# Patient Record
Sex: FEMALE | Race: WHITE | Employment: FULL TIME | URBAN - METROPOLITAN AREA
[De-identification: names, ages, dates, MRNs, and addresses within clinical notes are randomized per-mention and may not be internally consistent; named-entity substitution may affect disease eponyms.]

---

## 2017-02-12 ENCOUNTER — HOSPITAL ENCOUNTER (EMERGENCY)
Age: 42
Discharge: HOME | End: 2017-02-12
Attending: EMERGENCY MEDICINE
Payer: COMMERCIAL

## 2017-02-12 VITALS
SYSTOLIC BLOOD PRESSURE: 105 MMHG | OXYGEN SATURATION: 97 % | WEIGHT: 157 LBS | DIASTOLIC BLOOD PRESSURE: 64 MMHG | HEART RATE: 98 BPM | BODY MASS INDEX: 27.81 KG/M2 | TEMPERATURE: 97.7 F | RESPIRATION RATE: 16 BRPM

## 2017-02-12 DIAGNOSIS — B34.9 VIRAL SYNDROME: Primary | ICD-10-CM

## 2017-02-12 LAB
ALBUMIN SERPL-MCNC: 3.6 G/DL (ref 3.9–4.9)
ALP BLD-CCNC: 83 U/L (ref 40–130)
ALT SERPL-CCNC: 20 U/L (ref 0–33)
ANION GAP SERPL CALCULATED.3IONS-SCNC: 15 MEQ/L (ref 7–13)
AST SERPL-CCNC: 12 U/L (ref 0–35)
BASOPHILS ABSOLUTE: 0 K/UL (ref 0–0.2)
BASOPHILS RELATIVE PERCENT: 0 %
BETA-HYDROXYBUTYRATE: 0.9 MG/DL (ref 0.2–2.8)
BILIRUB SERPL-MCNC: 0.5 MG/DL (ref 0–1.2)
BILIRUBIN URINE: NEGATIVE
BLOOD, URINE: NEGATIVE
BUN BLDV-MCNC: 18 MG/DL (ref 6–20)
CALCIUM SERPL-MCNC: 8.1 MG/DL (ref 8.6–10.2)
CHLORIDE BLD-SCNC: 100 MEQ/L (ref 98–107)
CLARITY: CLEAR
CO2: 22 MEQ/L (ref 22–29)
COLOR: YELLOW
CREAT SERPL-MCNC: 0.69 MG/DL (ref 0.5–0.9)
EOSINOPHILS ABSOLUTE: 0.1 K/UL (ref 0–0.7)
EOSINOPHILS RELATIVE PERCENT: 1.4 %
GFR AFRICAN AMERICAN: >60
GFR NON-AFRICAN AMERICAN: >60
GLOBULIN: 3 G/DL (ref 2.3–3.5)
GLUCOSE BLD-MCNC: 252 MG/DL (ref 74–109)
GLUCOSE URINE: 500 MG/DL
HCT VFR BLD CALC: 46.4 % (ref 37–47)
HEMOGLOBIN: 15.8 G/DL (ref 12–16)
KETONES, URINE: NEGATIVE MG/DL
LEUKOCYTE ESTERASE, URINE: NEGATIVE
LYMPHOCYTES ABSOLUTE: 0.5 K/UL (ref 1–4.8)
LYMPHOCYTES RELATIVE PERCENT: 8.2 %
MCH RBC QN AUTO: 28.2 PG (ref 27–31.3)
MCHC RBC AUTO-ENTMCNC: 34.1 % (ref 33–37)
MCV RBC AUTO: 82.8 FL (ref 82–100)
MONOCYTES ABSOLUTE: 0.4 K/UL (ref 0.2–0.8)
MONOCYTES RELATIVE PERCENT: 7.3 %
NEUTROPHILS ABSOLUTE: 4.9 K/UL (ref 1.4–6.5)
NEUTROPHILS RELATIVE PERCENT: 83.1 %
NITRITE, URINE: NEGATIVE
PDW BLD-RTO: 12.7 % (ref 11.5–14.5)
PH UA: 5.5 (ref 5–9)
PLATELET # BLD: 255 K/UL (ref 130–400)
POTASSIUM SERPL-SCNC: 3.3 MEQ/L (ref 3.5–5.1)
PROTEIN UA: NEGATIVE MG/DL
RAPID INFLUENZA  B AGN: NEGATIVE
RAPID INFLUENZA A AGN: NEGATIVE
RBC # BLD: 5.6 M/UL (ref 4.2–5.4)
S PYO AG THROAT QL: NEGATIVE
SODIUM BLD-SCNC: 137 MEQ/L (ref 132–144)
SPECIFIC GRAVITY UA: 1.01 (ref 1–1.03)
TOTAL PROTEIN: 6.6 G/DL (ref 6.4–8.1)
URINE REFLEX TO CULTURE: ABNORMAL
UROBILINOGEN, URINE: 0.2 E.U./DL
WBC # BLD: 5.9 K/UL (ref 4.8–10.8)

## 2017-02-12 PROCEDURE — 87880 STREP A ASSAY W/OPTIC: CPT

## 2017-02-12 PROCEDURE — 86403 PARTICLE AGGLUT ANTBDY SCRN: CPT

## 2017-02-12 PROCEDURE — 96374 THER/PROPH/DIAG INJ IV PUSH: CPT

## 2017-02-12 PROCEDURE — 82010 KETONE BODYS QUAN: CPT

## 2017-02-12 PROCEDURE — 99283 EMERGENCY DEPT VISIT LOW MDM: CPT

## 2017-02-12 PROCEDURE — 87081 CULTURE SCREEN ONLY: CPT

## 2017-02-12 PROCEDURE — 81003 URINALYSIS AUTO W/O SCOPE: CPT

## 2017-02-12 PROCEDURE — 6360000002 HC RX W HCPCS: Performed by: EMERGENCY MEDICINE

## 2017-02-12 PROCEDURE — 80053 COMPREHEN METABOLIC PANEL: CPT

## 2017-02-12 PROCEDURE — 2580000003 HC RX 258: Performed by: EMERGENCY MEDICINE

## 2017-02-12 PROCEDURE — 85025 COMPLETE CBC W/AUTO DIFF WBC: CPT

## 2017-02-12 RX ORDER — 0.9 % SODIUM CHLORIDE 0.9 %
1000 INTRAVENOUS SOLUTION INTRAVENOUS ONCE
Status: COMPLETED | OUTPATIENT
Start: 2017-02-12 | End: 2017-02-12

## 2017-02-12 RX ORDER — ONDANSETRON 2 MG/ML
4 INJECTION INTRAMUSCULAR; INTRAVENOUS ONCE
Status: COMPLETED | OUTPATIENT
Start: 2017-02-12 | End: 2017-02-12

## 2017-02-12 RX ORDER — ONDANSETRON 4 MG/1
4 TABLET, FILM COATED ORAL EVERY 8 HOURS PRN
Qty: 20 TABLET | Refills: 0 | Status: SHIPPED | OUTPATIENT
Start: 2017-02-12 | End: 2017-03-15

## 2017-02-12 RX ADMIN — ONDANSETRON HYDROCHLORIDE 4 MG: 2 SOLUTION INTRAMUSCULAR; INTRAVENOUS at 19:00

## 2017-02-12 RX ADMIN — SODIUM CHLORIDE 1000 ML: 9 INJECTION, SOLUTION INTRAVENOUS at 19:00

## 2017-02-12 ASSESSMENT — ENCOUNTER SYMPTOMS
VOMITING: 1
FACIAL SWELLING: 0
SORE THROAT: 1
SINUS PRESSURE: 0
STRIDOR: 0
CHEST TIGHTNESS: 0
WHEEZING: 0
EYE PAIN: 0
TROUBLE SWALLOWING: 0
DIARRHEA: 1
CONSTIPATION: 0
VOICE CHANGE: 0
SHORTNESS OF BREATH: 0
BACK PAIN: 0
CHOKING: 0
ABDOMINAL PAIN: 0
NAUSEA: 1
EYE REDNESS: 0
COUGH: 0
BLOOD IN STOOL: 0
EYE DISCHARGE: 0

## 2017-02-14 LAB — S PYO THROAT QL CULT: NORMAL

## 2017-03-09 ENCOUNTER — HOSPITAL ENCOUNTER (EMERGENCY)
Age: 42
Discharge: HOME | End: 2017-03-09
Attending: EMERGENCY MEDICINE
Payer: COMMERCIAL

## 2017-03-09 ENCOUNTER — APPOINTMENT (OUTPATIENT)
Dept: GENERAL RADIOLOGY | Age: 42
End: 2017-03-09
Payer: COMMERCIAL

## 2017-03-09 VITALS
SYSTOLIC BLOOD PRESSURE: 118 MMHG | BODY MASS INDEX: 26.8 KG/M2 | RESPIRATION RATE: 20 BRPM | DIASTOLIC BLOOD PRESSURE: 78 MMHG | HEART RATE: 90 BPM | HEIGHT: 64 IN | OXYGEN SATURATION: 97 % | WEIGHT: 157 LBS | TEMPERATURE: 98.1 F

## 2017-03-09 DIAGNOSIS — M77.10 LATERAL EPICONDYLITIS  OF ELBOW: Primary | ICD-10-CM

## 2017-03-09 PROCEDURE — 73080 X-RAY EXAM OF ELBOW: CPT

## 2017-03-09 PROCEDURE — 96372 THER/PROPH/DIAG INJ SC/IM: CPT

## 2017-03-09 PROCEDURE — 99283 EMERGENCY DEPT VISIT LOW MDM: CPT

## 2017-03-09 PROCEDURE — 6360000002 HC RX W HCPCS: Performed by: EMERGENCY MEDICINE

## 2017-03-09 RX ORDER — KETOROLAC TROMETHAMINE 30 MG/ML
60 INJECTION, SOLUTION INTRAMUSCULAR; INTRAVENOUS ONCE
Status: COMPLETED | OUTPATIENT
Start: 2017-03-09 | End: 2017-03-09

## 2017-03-09 RX ORDER — HYDROCODONE BITARTRATE AND ACETAMINOPHEN 5; 325 MG/1; MG/1
1 TABLET ORAL EVERY 6 HOURS PRN
Qty: 8 TABLET | Refills: 0 | Status: SHIPPED | OUTPATIENT
Start: 2017-03-09 | End: 2017-03-13

## 2017-03-09 RX ORDER — ETODOLAC 400 MG/1
400 TABLET, EXTENDED RELEASE ORAL DAILY
Qty: 10 TABLET | Refills: 0 | Status: SHIPPED | OUTPATIENT
Start: 2017-03-09 | End: 2017-03-15

## 2017-03-09 RX ADMIN — KETOROLAC TROMETHAMINE 60 MG: 30 INJECTION, SOLUTION INTRAMUSCULAR at 12:12

## 2017-03-09 ASSESSMENT — ENCOUNTER SYMPTOMS
BLOOD IN STOOL: 0
COUGH: 0
RHINORRHEA: 0
EYE PAIN: 0
BACK PAIN: 0
NAUSEA: 0
DIARRHEA: 0
ABDOMINAL PAIN: 0
WHEEZING: 0
VOMITING: 0
SHORTNESS OF BREATH: 0
CHEST TIGHTNESS: 0
TROUBLE SWALLOWING: 0

## 2017-03-09 ASSESSMENT — PAIN DESCRIPTION - PAIN TYPE: TYPE: ACUTE PAIN

## 2017-03-09 ASSESSMENT — PAIN SCALES - GENERAL
PAINLEVEL_OUTOF10: 10
PAINLEVEL_OUTOF10: 10

## 2017-03-09 ASSESSMENT — PAIN DESCRIPTION - ONSET: ONSET: PROGRESSIVE

## 2017-03-09 ASSESSMENT — PAIN DESCRIPTION - ORIENTATION: ORIENTATION: RIGHT

## 2017-03-09 ASSESSMENT — PAIN DESCRIPTION - LOCATION: LOCATION: ELBOW

## 2017-03-09 ASSESSMENT — PAIN DESCRIPTION - DESCRIPTORS: DESCRIPTORS: SHARP

## 2017-03-09 ASSESSMENT — PAIN DESCRIPTION - PROGRESSION: CLINICAL_PROGRESSION: GRADUALLY WORSENING

## 2017-03-09 ASSESSMENT — PAIN DESCRIPTION - FREQUENCY: FREQUENCY: CONTINUOUS

## 2017-03-15 ENCOUNTER — TELEPHONE (OUTPATIENT)
Dept: PRIMARY CARE CLINIC | Age: 42
End: 2017-03-15

## 2017-03-15 ENCOUNTER — OFFICE VISIT (OUTPATIENT)
Dept: PRIMARY CARE CLINIC | Age: 42
End: 2017-03-15

## 2017-03-15 VITALS
WEIGHT: 161 LBS | SYSTOLIC BLOOD PRESSURE: 112 MMHG | DIASTOLIC BLOOD PRESSURE: 74 MMHG | TEMPERATURE: 98.2 F | RESPIRATION RATE: 12 BRPM | HEART RATE: 84 BPM | HEIGHT: 64 IN | BODY MASS INDEX: 27.49 KG/M2

## 2017-03-15 DIAGNOSIS — M25.521 RIGHT ELBOW PAIN: ICD-10-CM

## 2017-03-15 DIAGNOSIS — M77.11 EPICONDYLITIS, LATERAL, RIGHT: Primary | ICD-10-CM

## 2017-03-15 DIAGNOSIS — E11.9 TYPE 2 DIABETES MELLITUS WITHOUT COMPLICATION, WITHOUT LONG-TERM CURRENT USE OF INSULIN (HCC): ICD-10-CM

## 2017-03-15 LAB
CREATININE URINE: 67.3 MG/DL
HBA1C MFR BLD: 6.7 % (ref 4.8–5.9)
MICROALBUMIN UR-MCNC: <1.2 MG/DL
MICROALBUMIN/CREAT UR-RTO: NORMAL MG/G (ref 0–30)

## 2017-03-15 PROCEDURE — 20605 DRAIN/INJ JOINT/BURSA W/O US: CPT | Performed by: FAMILY MEDICINE

## 2017-03-15 PROCEDURE — 99213 OFFICE O/P EST LOW 20 MIN: CPT | Performed by: FAMILY MEDICINE

## 2017-03-15 RX ORDER — TRIAMCINOLONE ACETONIDE 40 MG/ML
80 INJECTION, SUSPENSION INTRA-ARTICULAR; INTRAMUSCULAR ONCE
Status: COMPLETED | OUTPATIENT
Start: 2017-03-15 | End: 2017-03-15

## 2017-03-15 RX ORDER — HYDROCODONE BITARTRATE AND ACETAMINOPHEN 5; 325 MG/1; MG/1
1 TABLET ORAL EVERY 6 HOURS PRN
COMMUNITY
End: 2017-04-10

## 2017-03-15 RX ORDER — TRAMADOL HYDROCHLORIDE 50 MG/1
100 TABLET ORAL EVERY 8 HOURS PRN
Qty: 18 TABLET | Refills: 0 | Status: SHIPPED | OUTPATIENT
Start: 2017-03-15 | End: 2017-03-21

## 2017-03-15 RX ADMIN — TRIAMCINOLONE ACETONIDE 80 MG: 40 INJECTION, SUSPENSION INTRA-ARTICULAR; INTRAMUSCULAR at 13:55

## 2017-03-15 ASSESSMENT — ENCOUNTER SYMPTOMS
CONSTIPATION: 0
DIARRHEA: 0
COLOR CHANGE: 0
ABDOMINAL PAIN: 0
PHOTOPHOBIA: 0
CHOKING: 0
CHEST TIGHTNESS: 0
STRIDOR: 0
APNEA: 0
NAUSEA: 0
WHEEZING: 0
EYE DISCHARGE: 0
EYE REDNESS: 0
FACIAL SWELLING: 0
EYE PAIN: 0

## 2017-09-13 RX ORDER — FLUTICASONE PROPIONATE 50 MCG
2 SPRAY, SUSPENSION (ML) NASAL DAILY
Qty: 1 BOTTLE | Refills: 1 | Status: SHIPPED | OUTPATIENT
Start: 2017-09-13 | End: 2017-11-22

## 2017-09-13 RX ORDER — CETIRIZINE HYDROCHLORIDE, PSEUDOEPHEDRINE HYDROCHLORIDE 5; 120 MG/1; MG/1
1 TABLET, FILM COATED, EXTENDED RELEASE ORAL 2 TIMES DAILY
Qty: 60 TABLET | Refills: 0 | Status: SHIPPED | OUTPATIENT
Start: 2017-09-13 | End: 2017-10-13

## 2017-09-13 RX ORDER — AMOXICILLIN AND CLAVULANATE POTASSIUM 875; 125 MG/1; MG/1
1 TABLET, FILM COATED ORAL 2 TIMES DAILY
Qty: 20 TABLET | Refills: 0 | Status: SHIPPED | OUTPATIENT
Start: 2017-09-13 | End: 2017-09-23

## 2017-10-11 ENCOUNTER — OFFICE VISIT (OUTPATIENT)
Dept: PRIMARY CARE CLINIC | Age: 42
End: 2017-10-11

## 2017-10-11 VITALS
BODY MASS INDEX: 26.8 KG/M2 | WEIGHT: 157 LBS | SYSTOLIC BLOOD PRESSURE: 104 MMHG | TEMPERATURE: 97.7 F | DIASTOLIC BLOOD PRESSURE: 78 MMHG | HEIGHT: 64 IN | RESPIRATION RATE: 14 BRPM | HEART RATE: 88 BPM

## 2017-10-11 DIAGNOSIS — E11.9 TYPE 2 DIABETES MELLITUS WITHOUT COMPLICATION, WITHOUT LONG-TERM CURRENT USE OF INSULIN (HCC): Primary | ICD-10-CM

## 2017-10-11 DIAGNOSIS — R53.83 FATIGUE, UNSPECIFIED TYPE: ICD-10-CM

## 2017-10-11 DIAGNOSIS — R61 DIAPHORESIS: ICD-10-CM

## 2017-10-11 PROCEDURE — 99214 OFFICE O/P EST MOD 30 MIN: CPT | Performed by: FAMILY MEDICINE

## 2017-10-11 ASSESSMENT — ENCOUNTER SYMPTOMS
CHOKING: 0
COLOR CHANGE: 0
WHEEZING: 0
NAUSEA: 0
PHOTOPHOBIA: 0
APNEA: 0
CHEST TIGHTNESS: 0
EYE REDNESS: 0
STRIDOR: 0
CONSTIPATION: 0
VISUAL CHANGE: 0
DIARRHEA: 0
BLURRED VISION: 0
EYE DISCHARGE: 0
FACIAL SWELLING: 0
ABDOMINAL PAIN: 0
EYE PAIN: 0

## 2017-10-11 ASSESSMENT — PATIENT HEALTH QUESTIONNAIRE - PHQ9
1. LITTLE INTEREST OR PLEASURE IN DOING THINGS: 0
SUM OF ALL RESPONSES TO PHQ9 QUESTIONS 1 & 2: 0
SUM OF ALL RESPONSES TO PHQ QUESTIONS 1-9: 0
2. FEELING DOWN, DEPRESSED OR HOPELESS: 0

## 2017-10-11 NOTE — PROGRESS NOTES
Subjective:      Patient ID: Bettie Hill is a 38 y.o. female who presents today for:  Chief Complaint   Patient presents with    Diabetes     Pt. is here for a f/u on DM. Pt. is currently taking Jardiance and Januvia which she feels like she is always hungry. Pt. wants to know if she can have something to curve her appetite. Pt. has stopped Januvia because she felt like she was gaining weight. Pt. feels sluggish, has little energy. Pt. states that she is sweating immediately after she eats & all day long, like hot flashes. Pt. would like to have labs done today. Diabetes   She presents for her follow-up diabetic visit. She has type 2 diabetes mellitus. Her disease course has been stable. Hypoglycemia symptoms include sweats. Pertinent negatives for hypoglycemia include no confusion, headaches, nervousness/anxiousness, pallor, speech difficulty or tremors. Associated symptoms include fatigue and polyphagia. Pertinent negatives for diabetes include no blurred vision, no chest pain, no foot paresthesias, no foot ulcerations, no polydipsia, no polyuria, no visual change, no weakness and no weight loss. There are no hypoglycemic complications. Symptoms are stable. There are no diabetic complications. Risk factors for coronary artery disease include diabetes mellitus. Current diabetic treatments: Merlinda Endow. She is compliant with treatment all of the time (no longer taking Januvia). Her weight is fluctuating minimally. She is following a generally healthy diet. When asked about meal planning, she reported none.        Past Medical History:   Diagnosis Date    Contusion of left shoulder 6/16/2016    DM (diabetes mellitus) ([facility] [address] 11/7/2014    Dysmenorrhea 6/29/2015    Fatigue 2/6/2015    Gestational diabetes, x 2 of 5 pregnancies 11/7/2014    S/P vaginal hysterectomy, 10/15 4/7/2016    Uterine fibroids, Dr Miriam Morales 6/29/2015     Past Surgical History:   Procedure Laterality Date    BLADDER SUSPENSION      CHOLECYSTECTOMY      COLONOSCOPY      HEMORRHOID SURGERY  2001    HYSTERECTOMY       Family History   Problem Relation Age of Onset    Asthma Mother     Depression Mother     Cancer Mother     Diabetes Maternal Grandmother     Depression Maternal Grandmother     Diabetes Paternal Grandmother     Diabetes Paternal Grandfather     Diabetes Paternal Aunt     Diabetes Paternal Uncle     High Blood Pressure Maternal Grandfather     Heart Disease Maternal Grandfather      Social History     Social History    Marital status: Single     Spouse name: N/A    Number of children: N/A    Years of education: N/A     Occupational History    Not on file. Social History Main Topics    Smoking status: Former Smoker    Smokeless tobacco: Never Used    Alcohol use No    Drug use: No    Sexual activity: Yes     Partners: Male     Other Topics Concern    Not on file     Social History Narrative    No narrative on file     Allergies:  Loura Needy; Metformin and related; and Oxycodone-acetaminophen    Review of Systems   Constitutional: Positive for fatigue. Negative for activity change, appetite change, chills, diaphoresis, fever and weight loss. HENT: Negative for congestion, ear discharge, ear pain, facial swelling, hearing loss and mouth sores. Eyes: Negative for blurred vision, photophobia, pain, discharge and redness. Respiratory: Negative for apnea, choking, chest tightness, wheezing and stridor. Cardiovascular: Negative for chest pain, palpitations and leg swelling. Gastrointestinal: Negative for abdominal pain, constipation, diarrhea and nausea. Endocrine: Positive for polyphagia. Negative for cold intolerance, heat intolerance, polydipsia and polyuria. Genitourinary: Negative for dysuria and frequency. Musculoskeletal: Negative for gait problem, joint swelling, neck pain and neck stiffness. Skin: Negative for color change, pallor, rash and wound. Allergic/Immunologic: Negative for immunocompromised state. Neurological: Negative for tremors, syncope, facial asymmetry, speech difficulty, weakness and headaches. Psychiatric/Behavioral: Negative for confusion and hallucinations. The patient is not nervous/anxious and is not hyperactive. Objective:   /78 (Site: Right Arm, Position: Sitting, Cuff Size: Large Adult)   Pulse 88   Temp 97.7 °F (36.5 °C) (Oral)   Resp 14   Ht 5' 4\" (1.626 m)   Wt 157 lb (71.2 kg)   LMP 06/25/2015   BMI 26.95 kg/m²     Physical Exam   Constitutional: She is oriented to person, place, and time. She appears well-developed and well-nourished. HENT:   Head: Normocephalic and atraumatic. Right Ear: Hearing, tympanic membrane, external ear and ear canal normal. No drainage or tenderness. Left Ear: Hearing, tympanic membrane, external ear and ear canal normal. No drainage. Mouth/Throat: Oropharynx is clear and moist. No oropharyngeal exudate. Eyes: Conjunctivae and EOM are normal. Pupils are equal, round, and reactive to light. Right eye exhibits no discharge. Left eye exhibits no discharge. No scleral icterus. Neck: Normal range of motion. Neck supple. No tracheal deviation present. No thyromegaly present. Cardiovascular: Normal rate, regular rhythm, normal heart sounds and intact distal pulses. Exam reveals no gallop and no friction rub. No murmur heard. Pulmonary/Chest: Effort normal and breath sounds normal. No respiratory distress. She has no wheezes. She has no rales. She exhibits no tenderness. Abdominal: Soft. Bowel sounds are normal. She exhibits no distension and no mass. There is tenderness in the right lower quadrant. There is no rebound and no guarding. Musculoskeletal: She exhibits no edema. Lymphadenopathy:     She has no cervical adenopathy. Neurological: She is alert and oriented to person, place, and time. Coordination normal.   Skin: Skin is warm and dry. No rash noted. Return in about 6 weeks (around 11/22/2017) for Victoza f/u. I, Niranjan Lake CMA   , am scribing for and in the presence of Jia Isaacs DO. Electronically signed by :  Niranjan Horn DO, personally performed the services described in this documentation, as scribed by Ammy Dumont CMA   in my presence, and it is both accurate and complete.  Electronically signed by: Jia Isaacs DO    10/12/17 7:39 AM    Jia Isaacs DO

## 2017-10-14 DIAGNOSIS — E11.9 TYPE 2 DIABETES MELLITUS WITHOUT COMPLICATION, WITHOUT LONG-TERM CURRENT USE OF INSULIN (HCC): ICD-10-CM

## 2017-10-14 DIAGNOSIS — R53.83 FATIGUE, UNSPECIFIED TYPE: ICD-10-CM

## 2017-10-14 DIAGNOSIS — R61 DIAPHORESIS: ICD-10-CM

## 2017-10-14 LAB
ALBUMIN SERPL-MCNC: 4 G/DL (ref 3.9–4.9)
ALP BLD-CCNC: 92 U/L (ref 40–130)
ALT SERPL-CCNC: 20 U/L (ref 0–33)
ANION GAP SERPL CALCULATED.3IONS-SCNC: 14 MEQ/L (ref 7–13)
AST SERPL-CCNC: 10 U/L (ref 0–35)
BILIRUB SERPL-MCNC: 0.3 MG/DL (ref 0–1.2)
BUN BLDV-MCNC: 16 MG/DL (ref 6–20)
CALCIUM SERPL-MCNC: 8.9 MG/DL (ref 8.6–10.2)
CHLORIDE BLD-SCNC: 104 MEQ/L (ref 98–107)
CHOLESTEROL, TOTAL: 192 MG/DL (ref 0–199)
CO2: 21 MEQ/L (ref 22–29)
CREAT SERPL-MCNC: 0.5 MG/DL (ref 0.5–0.9)
FOLLICLE STIMULATING HORMONE: 51.5 MIU/ML
GFR AFRICAN AMERICAN: >60
GFR NON-AFRICAN AMERICAN: >60
GLOBULIN: 2.5 G/DL (ref 2.3–3.5)
GLUCOSE BLD-MCNC: 109 MG/DL (ref 74–109)
HBA1C MFR BLD: 6.8 % (ref 4.8–5.9)
HDLC SERPL-MCNC: 45 MG/DL (ref 40–59)
LDL CHOLESTEROL CALCULATED: 132 MG/DL (ref 0–129)
LUTEINIZING HORMONE: 25.8 MIU/ML
POTASSIUM SERPL-SCNC: 4.5 MEQ/L (ref 3.5–5.1)
SODIUM BLD-SCNC: 139 MEQ/L (ref 132–144)
T4 FREE: 1.34 NG/DL (ref 0.93–1.7)
T4 TOTAL: 7.7 UG/DL (ref 4.5–11.7)
TOTAL PROTEIN: 6.5 G/DL (ref 6.4–8.1)
TRIGL SERPL-MCNC: 74 MG/DL (ref 0–200)
TSH SERPL DL<=0.05 MIU/L-ACNC: 1.48 UIU/ML (ref 0.27–4.2)

## 2017-10-18 LAB
ESTRADIOL LEVEL: 14.6 PG/ML
ESTROGEN TOTAL: 30.4 PG/ML
ESTRONE: 15.8 PG/ML

## 2017-10-31 RX ORDER — EMPAGLIFLOZIN 25 MG/1
TABLET, FILM COATED ORAL
Qty: 30 TABLET | Refills: 0 | Status: SHIPPED | OUTPATIENT
Start: 2017-10-31 | End: 2018-01-15 | Stop reason: SDUPTHER

## 2017-11-14 NOTE — TELEPHONE ENCOUNTER
Pt is requesting and rx for Victoza be sent to pharm on file. She is out of the samples we gave her and we don't have any more.

## 2017-11-22 ENCOUNTER — OFFICE VISIT (OUTPATIENT)
Dept: PRIMARY CARE CLINIC | Age: 42
End: 2017-11-22

## 2017-11-22 VITALS
HEART RATE: 84 BPM | HEIGHT: 64 IN | TEMPERATURE: 97.6 F | BODY MASS INDEX: 27.14 KG/M2 | RESPIRATION RATE: 16 BRPM | SYSTOLIC BLOOD PRESSURE: 118 MMHG | WEIGHT: 159 LBS | DIASTOLIC BLOOD PRESSURE: 76 MMHG

## 2017-11-22 DIAGNOSIS — Z78.0 POST-MENOPAUSAL: ICD-10-CM

## 2017-11-22 DIAGNOSIS — Z23 NEED FOR INFLUENZA VACCINATION: ICD-10-CM

## 2017-11-22 DIAGNOSIS — E11.9 TYPE 2 DIABETES MELLITUS WITHOUT COMPLICATION, WITHOUT LONG-TERM CURRENT USE OF INSULIN (HCC): Primary | ICD-10-CM

## 2017-11-22 DIAGNOSIS — R53.83 FATIGUE, UNSPECIFIED TYPE: ICD-10-CM

## 2017-11-22 PROCEDURE — G8484 FLU IMMUNIZE NO ADMIN: HCPCS | Performed by: FAMILY MEDICINE

## 2017-11-22 PROCEDURE — 90471 IMMUNIZATION ADMIN: CPT | Performed by: FAMILY MEDICINE

## 2017-11-22 PROCEDURE — G8427 DOCREV CUR MEDS BY ELIG CLIN: HCPCS | Performed by: FAMILY MEDICINE

## 2017-11-22 PROCEDURE — 99213 OFFICE O/P EST LOW 20 MIN: CPT | Performed by: FAMILY MEDICINE

## 2017-11-22 PROCEDURE — 1036F TOBACCO NON-USER: CPT | Performed by: FAMILY MEDICINE

## 2017-11-22 PROCEDURE — 3044F HG A1C LEVEL LT 7.0%: CPT | Performed by: FAMILY MEDICINE

## 2017-11-22 PROCEDURE — G8419 CALC BMI OUT NRM PARAM NOF/U: HCPCS | Performed by: FAMILY MEDICINE

## 2017-11-22 PROCEDURE — 90686 IIV4 VACC NO PRSV 0.5 ML IM: CPT | Performed by: FAMILY MEDICINE

## 2017-11-22 ASSESSMENT — ENCOUNTER SYMPTOMS
BLURRED VISION: 0
BACK PAIN: 0
EYES NEGATIVE: 1
WHEEZING: 0
PHOTOPHOBIA: 0
EYE ITCHING: 0
RESPIRATORY NEGATIVE: 1
DIARRHEA: 0
GASTROINTESTINAL NEGATIVE: 1
CONSTIPATION: 0
SHORTNESS OF BREATH: 0
ABDOMINAL PAIN: 0
EYE REDNESS: 0
VISUAL CHANGE: 0

## 2017-11-22 NOTE — PROGRESS NOTES
normal. Pupils are equal, round, and reactive to light. No scleral icterus. Neck: Normal range of motion. Neck supple. Cardiovascular: Normal rate, regular rhythm and normal heart sounds. Exam reveals no gallop and no friction rub. No murmur heard. Pulmonary/Chest: Effort normal and breath sounds normal. No respiratory distress. She has no wheezes. She has no rales. She exhibits no tenderness. Neurological: She is alert and oriented to person, place, and time. Skin: Skin is warm and dry. No rash noted. No erythema. No pallor. Psychiatric: She has a normal mood and affect. Her behavior is normal. Judgment normal.   Nursing note and vitals reviewed. Assessment:     1. Type 2 diabetes mellitus without complication, without long-term current use of insulin ([facility] [address]     2. Fatigue, unspecified type     3. Post-menopausal     4. Need for influenza vaccination  INFLUENZA, QUADV, 3 YRS AND OLDER, IM, PF, PREFILL SYR OR SDV, 0.5ML (FLUZONE QUADV, PF)       Plan:      Orders Placed This Encounter   Procedures    INFLUENZA, QUADV, 3 YRS AND OLDER, IM, PF, PREFILL SYR OR SDV, 0.5ML (FLUZONE QUADV, PF)     Orders Placed This Encounter   Medications    estrogens, conjugated, (PREMARIN) 0.3 MG tablet     Sig: Take 1 tablet by mouth daily     Dispense:  30 tablet     Refill:  3       Controlled Substances Monitoring:      No Follow-up on file. I, Ana Lake CMA   , am scribing for and in the presence of [employer], DO. Electronically signed by :  Ana Koehler DO, personally performed the services described in this documentation, as scribed by Berta Moreno CMA   in my presence, and it is both accurate and complete.  Electronically signed by: [employer], DO    11/22/17 1:28 PM    Elsa Mcginnis DO

## 2017-12-28 RX ORDER — NAPROXEN 500 MG/1
TABLET ORAL
Qty: 30 TABLET | Refills: 0 | Status: SHIPPED | OUTPATIENT
Start: 2017-12-28 | End: 2018-01-15 | Stop reason: SDUPTHER

## 2017-12-28 RX ORDER — PEN NEEDLE, DIABETIC 32GX 5/32"
NEEDLE, DISPOSABLE MISCELLANEOUS
Qty: 100 EACH | Refills: 0 | Status: SHIPPED | OUTPATIENT
Start: 2017-12-28 | End: 2018-08-09 | Stop reason: SDUPTHER

## 2018-01-15 ENCOUNTER — OFFICE VISIT (OUTPATIENT)
Dept: PRIMARY CARE CLINIC | Age: 43
End: 2018-01-15

## 2018-01-15 VITALS
HEIGHT: 64 IN | TEMPERATURE: 97.6 F | BODY MASS INDEX: 27.47 KG/M2 | SYSTOLIC BLOOD PRESSURE: 118 MMHG | RESPIRATION RATE: 14 BRPM | OXYGEN SATURATION: 99 % | WEIGHT: 160.9 LBS | HEART RATE: 86 BPM | DIASTOLIC BLOOD PRESSURE: 82 MMHG

## 2018-01-15 DIAGNOSIS — R20.2 NUMBNESS AND TINGLING OF RIGHT ARM: ICD-10-CM

## 2018-01-15 DIAGNOSIS — R20.0 NUMBNESS AND TINGLING OF RIGHT ARM: ICD-10-CM

## 2018-01-15 DIAGNOSIS — M77.01 MEDIAL EPICONDYLITIS OF ELBOW, RIGHT: Primary | ICD-10-CM

## 2018-01-15 DIAGNOSIS — M25.521 RIGHT ELBOW PAIN: ICD-10-CM

## 2018-01-15 PROCEDURE — G8419 CALC BMI OUT NRM PARAM NOF/U: HCPCS | Performed by: FAMILY MEDICINE

## 2018-01-15 PROCEDURE — 1036F TOBACCO NON-USER: CPT | Performed by: FAMILY MEDICINE

## 2018-01-15 PROCEDURE — G8484 FLU IMMUNIZE NO ADMIN: HCPCS | Performed by: FAMILY MEDICINE

## 2018-01-15 PROCEDURE — G8427 DOCREV CUR MEDS BY ELIG CLIN: HCPCS | Performed by: FAMILY MEDICINE

## 2018-01-15 PROCEDURE — 99213 OFFICE O/P EST LOW 20 MIN: CPT | Performed by: FAMILY MEDICINE

## 2018-01-15 PROCEDURE — 20605 DRAIN/INJ JOINT/BURSA W/O US: CPT | Performed by: FAMILY MEDICINE

## 2018-01-15 RX ORDER — TRAMADOL HYDROCHLORIDE 50 MG/1
100 TABLET ORAL EVERY 8 HOURS PRN
Qty: 20 TABLET | Refills: 0 | Status: SHIPPED | OUTPATIENT
Start: 2018-01-15 | End: 2018-01-21

## 2018-01-15 RX ORDER — NAPROXEN 500 MG/1
TABLET ORAL
Qty: 60 TABLET | Refills: 3 | Status: SHIPPED | OUTPATIENT
Start: 2018-01-15 | End: 2018-05-21 | Stop reason: SDUPTHER

## 2018-01-15 RX ORDER — TRIAMCINOLONE ACETONIDE 40 MG/ML
80 INJECTION, SUSPENSION INTRA-ARTICULAR; INTRAMUSCULAR ONCE
Status: COMPLETED | OUTPATIENT
Start: 2018-01-15 | End: 2018-01-15

## 2018-01-15 RX ADMIN — TRIAMCINOLONE ACETONIDE 80 MG: 40 INJECTION, SUSPENSION INTRA-ARTICULAR; INTRAMUSCULAR at 19:54

## 2018-01-15 ASSESSMENT — ENCOUNTER SYMPTOMS
EYE PAIN: 0
FACIAL SWELLING: 0
PHOTOPHOBIA: 0
NAUSEA: 0
APNEA: 0
ABDOMINAL PAIN: 0
EYE REDNESS: 0
CHEST TIGHTNESS: 0
STRIDOR: 0
EYE DISCHARGE: 0
WHEEZING: 0
COLOR CHANGE: 0
DIARRHEA: 0
CONSTIPATION: 0
CHOKING: 0

## 2018-01-15 NOTE — PROGRESS NOTES
pain    42703 - MD DRAIN/INJECT INTERMEDIATE JOINT/BURSA     Orders Placed This Encounter   Medications    naproxen (NAPROSYN) 500 MG tablet     Sig: TAKE 1 TABLET BY MOUTH TWICE DAILY WITH MEALS     Dispense:  60 tablet     Refill:  3    estrogens, conjugated, (PREMARIN) 0.3 MG tablet     Sig: Take 1 tablet by mouth daily     Dispense:  30 tablet     Refill:  3    Liraglutide (VICTOZA) 18 MG/3ML SOPN SC injection     Sig: .6mg qd x 1 week, then 1.2mg qd x 1 week, then 1.8 mg qd regularly. Also include #30, 32ga needles per month. Dispense:  9 mL     Refill:  3    empagliflozin (JARDIANCE) 25 MG tablet     Sig: TAKE 1 TABLET BY MOUTH DAILY     Dispense:  30 tablet     Refill:  3    triamcinolone acetonide (KENALOG-40) injection 80 mg    traMADol (ULTRAM) 50 MG tablet     Sig: Take 2 tablets by mouth every 8 hours as needed for Pain for up to 6 days . Take lowest dose possible to manage pain. Dispense:  20 tablet     Refill:  0       Controlled Substances Monitoring:      No Follow-up on file. IMayuri RMA  , am scribing for and in the presence of Yvonne Murillo DO. Electronically signed by :  JANEY Dennis    IYvonne DO, personally performed the services described in this documentation, as scribed by JANEY Dennis   in my presence, and it is both accurate and complete.  Electronically signed by: Yvonne Murillo DO    1/16/18 8:24 AM    Yvonne Murillo DO

## 2018-01-22 ENCOUNTER — HOSPITAL ENCOUNTER (OUTPATIENT)
Dept: GENERAL RADIOLOGY | Age: 43
Discharge: HOME | End: 2018-01-22
Payer: COMMERCIAL

## 2018-01-22 ENCOUNTER — HOSPITAL ENCOUNTER (OUTPATIENT)
Age: 43
Discharge: HOME | End: 2018-01-22
Payer: COMMERCIAL

## 2018-01-22 DIAGNOSIS — M77.01 MEDIAL EPICONDYLITIS OF ELBOW, RIGHT: ICD-10-CM

## 2018-01-22 DIAGNOSIS — M25.521 RIGHT ELBOW PAIN: ICD-10-CM

## 2018-01-22 PROCEDURE — 73080 X-RAY EXAM OF ELBOW: CPT

## 2018-01-24 ENCOUNTER — OFFICE VISIT (OUTPATIENT)
Dept: PRIMARY CARE CLINIC | Age: 43
End: 2018-01-24
Payer: COMMERCIAL

## 2018-01-24 VITALS
TEMPERATURE: 98 F | WEIGHT: 156.56 LBS | HEART RATE: 74 BPM | DIASTOLIC BLOOD PRESSURE: 60 MMHG | HEIGHT: 64 IN | BODY MASS INDEX: 26.73 KG/M2 | SYSTOLIC BLOOD PRESSURE: 118 MMHG | RESPIRATION RATE: 16 BRPM

## 2018-01-24 DIAGNOSIS — M77.11 LATERAL EPICONDYLITIS OF RIGHT ELBOW: Primary | ICD-10-CM

## 2018-01-24 DIAGNOSIS — M25.521 RIGHT ELBOW PAIN: ICD-10-CM

## 2018-01-24 DIAGNOSIS — M77.01 MEDIAL EPICONDYLITIS OF ELBOW, RIGHT: ICD-10-CM

## 2018-01-24 DIAGNOSIS — M77.9 BONE SPUR: ICD-10-CM

## 2018-01-24 PROCEDURE — 99213 OFFICE O/P EST LOW 20 MIN: CPT | Performed by: FAMILY MEDICINE

## 2018-01-24 PROCEDURE — G8484 FLU IMMUNIZE NO ADMIN: HCPCS | Performed by: FAMILY MEDICINE

## 2018-01-24 PROCEDURE — G8427 DOCREV CUR MEDS BY ELIG CLIN: HCPCS | Performed by: FAMILY MEDICINE

## 2018-01-24 PROCEDURE — G8419 CALC BMI OUT NRM PARAM NOF/U: HCPCS | Performed by: FAMILY MEDICINE

## 2018-01-24 PROCEDURE — 1036F TOBACCO NON-USER: CPT | Performed by: FAMILY MEDICINE

## 2018-01-24 ASSESSMENT — ENCOUNTER SYMPTOMS
ABDOMINAL PAIN: 0
CONSTIPATION: 0
EYE DISCHARGE: 0
CHOKING: 0
WHEEZING: 0
FACIAL SWELLING: 0
COLOR CHANGE: 0
STRIDOR: 0
DIARRHEA: 0
NAUSEA: 0
PHOTOPHOBIA: 0
EYE REDNESS: 0
CHEST TIGHTNESS: 0
EYE PAIN: 0
APNEA: 0

## 2018-01-24 NOTE — PROGRESS NOTES
Subjective:      Patient ID: Jaciel Medina is a 38 y.o. female who presents today for:  Chief Complaint   Patient presents with    Elbow Pain     Pt. is here for a f/u on right elbow pain. Pt. c/o of it feeling strained on the inside of her elbow and still swollen on the back of her elbow. Pt. rates the pain as 5/10. Pt. was given Naproxen and a cortisone injection with some relief. Pt. states when she holds things it feels heavy. Pt. would like to go over the Xray results from 01/22/2018. Arm Pain    The incident occurred more than 1 week ago. The injury mechanism was repetitive motion. The pain is present in the right elbow. The quality of the pain is described as aching. The pain radiates to the right arm. The pain is at a severity of 5/10. The pain is moderate. The pain has been constant since the incident. Associated symptoms include muscle weakness. Pertinent negatives include no chest pain, numbness or tingling. The symptoms are aggravated by movement, lifting and palpation. Treatments tried: naprosyn, cortisone injection. The treatment provided mild relief. Her right elbow x-rays from 3/9/17 & 1/22/18 were compared & reviewed with her today. States that the cortisone injection she got on 1/15/18 only helped minimally. Pt was advised to use a tennis elbow strap - which she already has.     Past Medical History:   Diagnosis Date    Contusion of left shoulder 6/16/2016    DM (diabetes mellitus) ([facility] [address] 11/7/2014    Dysmenorrhea 6/29/2015    Fatigue 2/6/2015    Gestational diabetes, x 2 of 5 pregnancies 11/7/2014    Lateral epicondylitis of right elbow 1/24/2018    S/P vaginal hysterectomy, 10/15 4/7/2016    Uterine fibroids, Dr Chuck Menendez 6/29/2015     Past Surgical History:   Procedure Laterality Date    BLADDER SUSPENSION      CHOLECYSTECTOMY      COLONOSCOPY      HEMORRHOID SURGERY  2001    HYSTERECTOMY       Family History   Problem Relation Age of Onset    Asthma Mother     Depression Mother     Cancer Mother     Diabetes Maternal Grandmother     Depression Maternal Grandmother     Diabetes Paternal Grandmother     Diabetes Paternal Grandfather     Diabetes Paternal Aunt     Diabetes Paternal Uncle     High Blood Pressure Maternal Grandfather     Heart Disease Maternal Grandfather      Social History     Social History    Marital status: Single     Spouse name: N/A    Number of children: N/A    Years of education: N/A     Occupational History    Not on file. Social History Main Topics    Smoking status: Former Smoker    Smokeless tobacco: Never Used    Alcohol use No    Drug use: No    Sexual activity: Yes     Partners: Male     Other Topics Concern    Not on file     Social History Narrative    No narrative on file     Allergies:  Invokana [canagliflozin] and Metformin and related    Review of Systems   Constitutional: Negative for activity change, appetite change, chills, diaphoresis and fever. HENT: Negative for congestion, ear discharge, ear pain, facial swelling, hearing loss and mouth sores. Eyes: Negative for photophobia, pain, discharge and redness. Respiratory: Negative for apnea, choking, chest tightness, wheezing and stridor. Cardiovascular: Negative for chest pain, palpitations and leg swelling. Gastrointestinal: Negative for abdominal pain, constipation, diarrhea and nausea. Endocrine: Negative for cold intolerance, heat intolerance, polydipsia and polyphagia. Genitourinary: Negative for dysuria and frequency. Musculoskeletal: Negative for gait problem, joint swelling, neck pain and neck stiffness. Skin: Negative for color change, pallor, rash and wound. Allergic/Immunologic: Negative for immunocompromised state. Neurological: Negative for tingling, tremors, syncope, facial asymmetry, speech difficulty, numbness and headaches. Psychiatric/Behavioral: Negative for confusion and hallucinations.  The patient is not nervous/anxious and is not hyperactive. Objective:   /60 (Site: Left Arm, Position: Sitting, Cuff Size: Medium Adult)   Pulse 74   Temp 98 °F (36.7 °C) (Oral)   Resp 16   Ht 5' 4\" (1.626 m)   Wt 156 lb 9 oz (71 kg)   LMP 06/25/2015   Breastfeeding? No   BMI 26.87 kg/m²     Physical Exam   Constitutional: She is oriented to person, place, and time. She appears well-developed and well-nourished. HENT:   Head: Normocephalic and atraumatic. Right Ear: External ear normal.   Left Ear: External ear normal.   Eyes: Conjunctivae and EOM are normal. Pupils are equal, round, and reactive to light. Neck: Normal range of motion. Neck supple. Musculoskeletal:        Right elbow: She exhibits swelling. Tenderness found. Medial epicondyle tenderness noted. Neurological: She is alert and oriented to person, place, and time. Skin: Skin is warm and dry. Psychiatric: She has a normal mood and affect. Her behavior is normal. Judgment and thought content normal.       Assessment:     1. Lateral epicondylitis of right elbow     2. Medial epicondylitis of elbow, right     3. Right elbow pain     4. Bone spur, right ulna         Plan:      No orders of the defined types were placed in this encounter. No orders of the defined types were placed in this encounter. Controlled Substances Monitoring:      No Follow-up on file. Alayna ROMAN RMA  , am scribing for and in the presence of [employer], DO. Electronically signed by :  JANYE Ferrell [employer], DO, personally performed the services described in this documentation, as scribed by JANEY Ferrell   in my presence, and it is both accurate and complete.  Electronically signed by: Elsa Mcginnis DO    1/24/18 1:18 PM    Elsa Mcginnis DO

## 2018-02-28 ENCOUNTER — OFFICE VISIT (OUTPATIENT)
Dept: PRIMARY CARE CLINIC | Age: 43
End: 2018-02-28
Payer: COMMERCIAL

## 2018-02-28 VITALS
RESPIRATION RATE: 14 BRPM | BODY MASS INDEX: 26.98 KG/M2 | WEIGHT: 158 LBS | OXYGEN SATURATION: 95 % | DIASTOLIC BLOOD PRESSURE: 62 MMHG | SYSTOLIC BLOOD PRESSURE: 104 MMHG | HEIGHT: 64 IN | HEART RATE: 96 BPM | TEMPERATURE: 98 F

## 2018-02-28 DIAGNOSIS — M77.11 LATERAL EPICONDYLITIS OF RIGHT ELBOW: Primary | ICD-10-CM

## 2018-02-28 DIAGNOSIS — M25.521 RIGHT ELBOW PAIN: ICD-10-CM

## 2018-02-28 PROCEDURE — G8419 CALC BMI OUT NRM PARAM NOF/U: HCPCS | Performed by: FAMILY MEDICINE

## 2018-02-28 PROCEDURE — 1036F TOBACCO NON-USER: CPT | Performed by: FAMILY MEDICINE

## 2018-02-28 PROCEDURE — 99213 OFFICE O/P EST LOW 20 MIN: CPT | Performed by: FAMILY MEDICINE

## 2018-02-28 PROCEDURE — G8427 DOCREV CUR MEDS BY ELIG CLIN: HCPCS | Performed by: FAMILY MEDICINE

## 2018-02-28 PROCEDURE — 20605 DRAIN/INJ JOINT/BURSA W/O US: CPT | Performed by: FAMILY MEDICINE

## 2018-02-28 PROCEDURE — G8484 FLU IMMUNIZE NO ADMIN: HCPCS | Performed by: FAMILY MEDICINE

## 2018-02-28 ASSESSMENT — ENCOUNTER SYMPTOMS
COLOR CHANGE: 0
CONSTIPATION: 0
WHEEZING: 0
EYE PAIN: 0
EYE REDNESS: 0
STRIDOR: 0
CHEST TIGHTNESS: 0
PHOTOPHOBIA: 0
ABDOMINAL PAIN: 0
NAUSEA: 0
FACIAL SWELLING: 0
EYE DISCHARGE: 0
CHOKING: 0
DIARRHEA: 0
APNEA: 0

## 2018-02-28 NOTE — PROGRESS NOTES
Occupational History    Not on file. Social History Main Topics    Smoking status: Former Smoker    Smokeless tobacco: Never Used    Alcohol use No    Drug use: No    Sexual activity: Yes     Partners: Male     Other Topics Concern    Not on file     Social History Narrative    No narrative on file     Allergies:  Invokana [canagliflozin] and Metformin and related    Review of Systems   Constitutional: Negative for activity change, appetite change, chills, diaphoresis and fever. HENT: Negative for congestion, ear discharge, ear pain, facial swelling, hearing loss and mouth sores. Eyes: Negative for photophobia, pain, discharge and redness. Respiratory: Negative for apnea, choking, chest tightness, wheezing and stridor. Cardiovascular: Negative for chest pain, palpitations and leg swelling. Gastrointestinal: Negative for abdominal pain, constipation, diarrhea and nausea. Endocrine: Negative for cold intolerance, heat intolerance, polydipsia and polyphagia. Genitourinary: Negative for dysuria and frequency. Musculoskeletal: Negative for gait problem, joint swelling, neck pain and neck stiffness. Skin: Negative for color change, pallor, rash and wound. Allergic/Immunologic: Negative for immunocompromised state. Neurological: Positive for tingling and numbness. Negative for tremors, syncope, facial asymmetry, speech difficulty and headaches. Psychiatric/Behavioral: Negative for confusion and hallucinations. The patient is not nervous/anxious and is not hyperactive. Objective:   /62 (Site: Left Arm, Position: Sitting, Cuff Size: Large Adult)   Pulse 96   Temp 98 °F (36.7 °C) (Tympanic)   Resp 14   Ht 5' 4\" (1.626 m)   Wt 158 lb (71.7 kg)   LMP 06/25/2015   SpO2 95%   BMI 27.12 kg/m²     Physical Exam   Constitutional: She is oriented to person, place, and time. She appears well-developed and well-nourished. HENT:   Head: Normocephalic and atraumatic.

## 2018-03-01 RX ORDER — TRIAMCINOLONE ACETONIDE 40 MG/ML
80 INJECTION, SUSPENSION INTRA-ARTICULAR; INTRAMUSCULAR ONCE
Status: DISCONTINUED | OUTPATIENT
Start: 2018-03-01 | End: 2019-04-17 | Stop reason: ALTCHOICE

## 2018-04-26 DIAGNOSIS — K64.9 HEMORRHOIDS, UNSPECIFIED HEMORRHOID TYPE: Primary | ICD-10-CM

## 2018-05-16 ENCOUNTER — OFFICE VISIT (OUTPATIENT)
Dept: PRIMARY CARE CLINIC | Age: 43
End: 2018-05-16
Payer: COMMERCIAL

## 2018-05-16 VITALS
DIASTOLIC BLOOD PRESSURE: 78 MMHG | TEMPERATURE: 98.5 F | HEART RATE: 94 BPM | HEIGHT: 64 IN | RESPIRATION RATE: 16 BRPM | WEIGHT: 154.9 LBS | SYSTOLIC BLOOD PRESSURE: 116 MMHG | BODY MASS INDEX: 26.44 KG/M2 | OXYGEN SATURATION: 98 %

## 2018-05-16 DIAGNOSIS — Z86.19 H/O TRAVELER'S DIARRHEA: ICD-10-CM

## 2018-05-16 DIAGNOSIS — M77.11 LATERAL EPICONDYLITIS OF RIGHT ELBOW: ICD-10-CM

## 2018-05-16 DIAGNOSIS — E11.9 TYPE 2 DIABETES MELLITUS WITHOUT COMPLICATION, WITHOUT LONG-TERM CURRENT USE OF INSULIN (HCC): ICD-10-CM

## 2018-05-16 DIAGNOSIS — E11.9 TYPE 2 DIABETES MELLITUS WITHOUT COMPLICATION, WITHOUT LONG-TERM CURRENT USE OF INSULIN (HCC): Primary | ICD-10-CM

## 2018-05-16 DIAGNOSIS — Z23 NEED FOR HEPATITIS A VACCINATION: ICD-10-CM

## 2018-05-16 DIAGNOSIS — F40.243 FEAR OF FLYING: ICD-10-CM

## 2018-05-16 LAB
CREATININE URINE: 56.6 MG/DL
MICROALBUMIN UR-MCNC: <1.2 MG/DL
MICROALBUMIN/CREAT UR-RTO: NORMAL MG/G (ref 0–30)

## 2018-05-16 PROCEDURE — G8427 DOCREV CUR MEDS BY ELIG CLIN: HCPCS | Performed by: FAMILY MEDICINE

## 2018-05-16 PROCEDURE — 2022F DILAT RTA XM EVC RTNOPTHY: CPT | Performed by: FAMILY MEDICINE

## 2018-05-16 PROCEDURE — 99214 OFFICE O/P EST MOD 30 MIN: CPT | Performed by: FAMILY MEDICINE

## 2018-05-16 PROCEDURE — 90471 IMMUNIZATION ADMIN: CPT | Performed by: FAMILY MEDICINE

## 2018-05-16 PROCEDURE — 1036F TOBACCO NON-USER: CPT | Performed by: FAMILY MEDICINE

## 2018-05-16 PROCEDURE — 90632 HEPA VACCINE ADULT IM: CPT | Performed by: FAMILY MEDICINE

## 2018-05-16 PROCEDURE — G8419 CALC BMI OUT NRM PARAM NOF/U: HCPCS | Performed by: FAMILY MEDICINE

## 2018-05-16 PROCEDURE — 3046F HEMOGLOBIN A1C LEVEL >9.0%: CPT | Performed by: FAMILY MEDICINE

## 2018-05-16 RX ORDER — CIPROFLOXACIN 500 MG/1
500 TABLET, FILM COATED ORAL 2 TIMES DAILY
Qty: 14 TABLET | Refills: 1 | Status: SHIPPED | OUTPATIENT
Start: 2018-05-16 | End: 2018-05-23

## 2018-05-16 RX ORDER — LORAZEPAM 0.5 MG/1
0.5 TABLET ORAL EVERY 8 HOURS PRN
Qty: 6 TABLET | Refills: 0 | Status: SHIPPED | OUTPATIENT
Start: 2018-05-16 | End: 2018-05-19

## 2018-05-16 ASSESSMENT — ENCOUNTER SYMPTOMS
CHEST TIGHTNESS: 0
EYE PAIN: 0
CONSTIPATION: 0
BLURRED VISION: 0
STRIDOR: 0
EYE DISCHARGE: 0
VISUAL CHANGE: 0
FACIAL SWELLING: 0
ABDOMINAL PAIN: 0
COLOR CHANGE: 0
DIARRHEA: 0
NAUSEA: 0
WHEEZING: 0
PHOTOPHOBIA: 0
APNEA: 0
EYE REDNESS: 0
CHOKING: 0

## 2018-05-21 RX ORDER — NAPROXEN 500 MG/1
TABLET ORAL
Qty: 60 TABLET | Refills: 0 | Status: SHIPPED | OUTPATIENT
Start: 2018-05-21 | End: 2018-06-20 | Stop reason: SDUPTHER

## 2018-05-21 RX ORDER — CONJUGATED ESTROGENS 0.3 MG/1
0.3 TABLET, FILM COATED ORAL DAILY
Qty: 30 TABLET | Refills: 0 | Status: SHIPPED | OUTPATIENT
Start: 2018-05-21 | End: 2018-06-20 | Stop reason: SDUPTHER

## 2018-05-21 RX ORDER — EMPAGLIFLOZIN 25 MG/1
TABLET, FILM COATED ORAL
Qty: 30 TABLET | Refills: 0 | OUTPATIENT
Start: 2018-05-21

## 2018-06-20 RX ORDER — CONJUGATED ESTROGENS 0.3 MG/1
0.3 TABLET, FILM COATED ORAL DAILY
Qty: 30 TABLET | Refills: 0 | Status: SHIPPED | OUTPATIENT
Start: 2018-06-20 | End: 2018-07-20 | Stop reason: SDUPTHER

## 2018-06-20 RX ORDER — NAPROXEN 500 MG/1
TABLET ORAL
Qty: 60 TABLET | Refills: 0 | Status: ON HOLD | OUTPATIENT
Start: 2018-06-20 | End: 2019-04-17 | Stop reason: ALTCHOICE

## 2018-07-20 RX ORDER — CONJUGATED ESTROGENS 0.3 MG/1
0.3 TABLET, FILM COATED ORAL DAILY
Qty: 30 TABLET | Refills: 0 | Status: SHIPPED | OUTPATIENT
Start: 2018-07-20 | End: 2018-08-19 | Stop reason: SDUPTHER

## 2018-08-05 ENCOUNTER — HOSPITAL ENCOUNTER (EMERGENCY)
Age: 43
Discharge: HOME | End: 2018-08-05
Attending: EMERGENCY MEDICINE
Payer: COMMERCIAL

## 2018-08-05 VITALS
HEIGHT: 63 IN | WEIGHT: 150 LBS | SYSTOLIC BLOOD PRESSURE: 136 MMHG | TEMPERATURE: 98.2 F | HEART RATE: 94 BPM | DIASTOLIC BLOOD PRESSURE: 77 MMHG | RESPIRATION RATE: 20 BRPM | OXYGEN SATURATION: 96 % | BODY MASS INDEX: 26.58 KG/M2

## 2018-08-05 DIAGNOSIS — N30.00 ACUTE CYSTITIS WITHOUT HEMATURIA: Primary | ICD-10-CM

## 2018-08-05 LAB
AMORPHOUS: ABNORMAL
BACTERIA: ABNORMAL /HPF
BILIRUBIN URINE: ABNORMAL
BLOOD, URINE: ABNORMAL
CLARITY: ABNORMAL
COLOR: ABNORMAL
EPITHELIAL CELLS, UA: ABNORMAL /HPF
GLUCOSE URINE: ABNORMAL MG/DL
KETONES, URINE: ABNORMAL MG/DL
LEUKOCYTE ESTERASE, URINE: ABNORMAL
NITRITE, URINE: ABNORMAL
PH UA: ABNORMAL (ref 5–9)
PROTEIN UA: ABNORMAL MG/DL
RBC UA: ABNORMAL /HPF (ref 0–2)
SPECIFIC GRAVITY UA: 1.02 (ref 1–1.03)
URINE REFLEX TO CULTURE: YES
URINE TYPE: ABNORMAL
UROBILINOGEN, URINE: ABNORMAL E.U./DL
WBC UA: ABNORMAL /HPF (ref 0–5)

## 2018-08-05 PROCEDURE — 87086 URINE CULTURE/COLONY COUNT: CPT

## 2018-08-05 PROCEDURE — 81001 URINALYSIS AUTO W/SCOPE: CPT

## 2018-08-05 PROCEDURE — 99283 EMERGENCY DEPT VISIT LOW MDM: CPT

## 2018-08-05 RX ORDER — NITROFURANTOIN 25; 75 MG/1; MG/1
100 CAPSULE ORAL 2 TIMES DAILY
Qty: 10 CAPSULE | Refills: 0 | Status: SHIPPED | OUTPATIENT
Start: 2018-08-05 | End: 2018-08-15

## 2018-08-05 ASSESSMENT — PAIN DESCRIPTION - LOCATION: LOCATION: ABDOMEN

## 2018-08-05 ASSESSMENT — PAIN DESCRIPTION - DESCRIPTORS: DESCRIPTORS: ACHING;BURNING

## 2018-08-05 ASSESSMENT — PAIN DESCRIPTION - FREQUENCY: FREQUENCY: INTERMITTENT

## 2018-08-05 ASSESSMENT — ENCOUNTER SYMPTOMS: VOMITING: 1

## 2018-08-05 ASSESSMENT — PAIN SCALES - GENERAL: PAINLEVEL_OUTOF10: 3

## 2018-08-05 ASSESSMENT — PAIN DESCRIPTION - PAIN TYPE: TYPE: ACUTE PAIN

## 2018-08-05 NOTE — ED PROVIDER NOTES
[address] [place] [address] ED  eMERGENCY dEPARTMENT eNCOUnter      Pt Name: Amirah Camargo  MRN: [id number]  [place] 1975  Date of evaluation: 8/5/2018  Provider: Luisa Atwood MD    [address] [place] [address]       Chief Complaint   Patient presents with    Urinary Frequency         HISTORY OF PRESENT ILLNESS   (Location/Symptom, Timing/Onset, Context/Setting, Quality, Duration, Modifying Factors, Severity)  Note limiting factors. Amirah Camargo is a 37 y.o. female who presents to the emergency department With dysuria and frequency since this past Wednesday. 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 However symptoms worsened since yesterday and she started herself on over-the-counter Azo without relief. She had one episode of vomiting yesterday. There is some suprapubic abdominal discomfort and pressure sensation. No flank or back discomfort. No abdominal pain. Patient has had hysterectomy. No vaginal complaints. The history is provided by the patient. Nursing Notes were reviewed. REVIEW OF SYSTEMS    (2-9 systems for level 4, 10 or more for level 5)     Review of Systems   Constitutional: Negative for fever. Gastrointestinal: Positive for vomiting. Genitourinary: Positive for dysuria and frequency. Negative for vaginal bleeding. Except as noted above the remainder of the review of systems was reviewed and negative.        PAST MEDICAL HISTORY     Past Medical History:   Diagnosis Date    Contusion of left shoulder 6/16/2016    DM (diabetes mellitus) ([facility] [address] 11/7/2014    Dysmenorrhea 6/29/2015    Fatigue 2/6/2015    Gestational diabetes, x 2 of 5 pregnancies 11/7/2014    Lateral epicondylitis of right elbow 1/24/2018    Lateral epicondylitis of right elbow 5/16/2018    S/P vaginal hysterectomy, 10/15 4/7/2016    Uterine fibroids, Dr Luis Eduardo Hayes 6/29/2015         SURGICAL HISTORY       Past Surgical History:   Procedure Laterality Date    BLADDER SUSPENSION      CHOLECYSTECTOMY      COLONOSCOPY      HEMORRHOID SURGERY  2001    HYSTERECTOMY           CURRENT MEDICATIONS       Previous Medications    BD PEN NEEDLE SIDNEY U/F 32G X 4 MM MISC    USE AS DIRECTED WITH VICTOZA    EMPAGLIFLOZIN (JARDIANCE) 25 MG TABLET    TAKE 1 TABLET BY MOUTH DAILY    HYDROCORTISONE (ANUSOL-HC) 2.5 % RECTAL CREAM    Place rectally 2 times daily. DO NOT exceed two uses per day, use for no more than 7 days. LIRAGLUTIDE (VICTOZA) 18 MG/3ML SOPN SC INJECTION    1.8 mg qd regularly. Also include #30, 32ga needles per month. NAPROXEN (NAPROSYN) 500 MG TABLET    TAKE 1 TABLET BY MOUTH TWICE DAILY WITH MEALS    PIROXICAM (FELDENE) 20 MG CAPSULE    Take 1 capsule by mouth daily    PRAMOXINE-ZINC (TRONOLANE) 1-5 % RECTAL CREAM    Place rectally every 2 hours as needed.     PREMARIN 0.3 MG TABLET    TAKE 1 TABLET BY MOUTH DAILY       ALLERGIES     Invokana [canagliflozin] and Metformin and related    FAMILY HISTORY       Family History   Problem Relation Age of Onset    Asthma Mother     Depression Mother     Cancer Mother     Diabetes Maternal Grandmother     Depression Maternal Grandmother     Diabetes Paternal Grandmother     Diabetes Paternal Grandfather     Diabetes Paternal Aunt     Diabetes Paternal Uncle     High Blood Pressure Maternal Grandfather     Heart Disease Maternal Grandfather           SOCIAL HISTORY       Social History     Social History    Marital status:      Spouse name: N/A    Number of children: N/A    Years of education: N/A     Social History Main Topics    Smoking status: Former Smoker    Smokeless tobacco: Never Used    Alcohol use No    Drug use: No    Sexual activity: Yes     Partners: Male     Other Topics Concern    None     Social History Narrative    None       SCREENINGS    [place] Coma Scale  Eye Opening: Spontaneous  Best Verbal Response: Oriented  Best Motor Response: Obeys labs were within normal range or not returned as of this dictation. EMERGENCY DEPARTMENT COURSE and DIFFERENTIAL DIAGNOSIS/MDM:   Vitals:    Vitals:    08/05/18 1041   BP: 136/77   Pulse: 94   Resp: 20   Temp: 98.2 °F (36.8 °C)   TempSrc: Oral   SpO2: 96%   Weight: 150 lb (68 kg)   Height: 5' 2.5\" (1.588 m)       Urinalysis with 10-20 white cells present. Bacteria. Culture pending. Patient be treated with Macrobid for UTI. She can continue over-the-counter Azo. If not improved within 48 hours she can have her physician check the culture results. Patient expressed understanding and agreement at time of discharge. MDM    CRITICAL CARE TIME   Total Critical Care time was  minutes, excluding separately reportable procedures. There was a high probability of clinically significant/life threatening deterioration in the patient's condition which required my urgent intervention. CONSULTS:  None    PROCEDURES:  Unless otherwise noted below, none     Procedures    FINAL IMPRESSION      1.  Acute cystitis without hematuria          DISPOSITION/PLAN   DISPOSITION Decision To Discharge 08/05/2018 11:25:38 AM      PATIENT REFERRED TO:  DO Farnaz Ya [address]       As needed if not better inh 2 days to check culture results      DISCHARGE MEDICATIONS:  New Prescriptions    NITROFURANTOIN, MACROCRYSTAL-MONOHYDRATE, (MACROBID) 100 MG CAPSULE    Take 1 capsule by mouth 2 times daily for 10 days          (Please note that portions of this note were completed with a voice recognition program.  Efforts were made to edit the dictations but occasionally words are mis-transcribed.)    Celia Forbes MD (electronically signed)  Attending Emergency Physician          Han Serra MD  08/05/18 3996

## 2018-08-07 LAB — URINE CULTURE, ROUTINE: NORMAL

## 2018-08-20 RX ORDER — CONJUGATED ESTROGENS 0.3 MG/1
0.3 TABLET, FILM COATED ORAL DAILY
Qty: 30 TABLET | Refills: 2 | Status: ON HOLD | OUTPATIENT
Start: 2018-08-20 | End: 2019-04-17

## 2018-09-24 ENCOUNTER — HOSPITAL ENCOUNTER (OUTPATIENT)
Age: 43
Setting detail: SPECIMEN
Discharge: HOME | End: 2018-09-24
Payer: COMMERCIAL

## 2018-09-24 DIAGNOSIS — N30.00 ACUTE CYSTITIS WITHOUT HEMATURIA: Primary | ICD-10-CM

## 2018-09-24 DIAGNOSIS — N30.00 ACUTE CYSTITIS WITHOUT HEMATURIA: ICD-10-CM

## 2018-09-24 PROCEDURE — 87077 CULTURE AEROBIC IDENTIFY: CPT

## 2018-09-24 PROCEDURE — 87186 SC STD MICRODIL/AGAR DIL: CPT

## 2018-09-24 PROCEDURE — 87086 URINE CULTURE/COLONY COUNT: CPT

## 2018-09-24 RX ORDER — SULFAMETHOXAZOLE AND TRIMETHOPRIM 800; 160 MG/1; MG/1
1 TABLET ORAL 2 TIMES DAILY
Qty: 20 TABLET | Refills: 0 | Status: SHIPPED | OUTPATIENT
Start: 2018-09-24 | End: 2018-10-04

## 2018-09-27 LAB
ORGANISM: ABNORMAL
URINE CULTURE, ROUTINE: ABNORMAL
URINE CULTURE, ROUTINE: ABNORMAL

## 2018-12-17 ENCOUNTER — TELEPHONE (OUTPATIENT)
Dept: PRIMARY CARE CLINIC | Age: 43
End: 2018-12-17

## 2018-12-19 DIAGNOSIS — M25.529 ELBOW PAIN, UNSPECIFIED LATERALITY: Primary | ICD-10-CM

## 2018-12-19 RX ORDER — TRAMADOL HYDROCHLORIDE 50 MG/1
50 TABLET ORAL EVERY 6 HOURS PRN
Qty: 60 TABLET | Refills: 0 | OUTPATIENT
Start: 2018-12-19 | End: 2019-01-18

## 2018-12-20 ENCOUNTER — HOSPITAL ENCOUNTER (OUTPATIENT)
Dept: ORTHOPEDIC SURGERY | Age: 43
Discharge: HOME | End: 2018-12-22
Payer: COMMERCIAL

## 2018-12-20 DIAGNOSIS — M25.521 ARTHRALGIA OF RIGHT UPPER ARM: ICD-10-CM

## 2018-12-20 PROCEDURE — 73080 X-RAY EXAM OF ELBOW: CPT

## 2019-02-14 RX ORDER — PEN NEEDLE, DIABETIC 32GX 5/32"
NEEDLE, DISPOSABLE MISCELLANEOUS
Refills: 0 | OUTPATIENT
Start: 2019-02-14

## 2019-02-21 ENCOUNTER — OFFICE VISIT (OUTPATIENT)
Dept: PRIMARY CARE CLINIC | Age: 44
End: 2019-02-21
Payer: COMMERCIAL

## 2019-02-21 VITALS
WEIGHT: 160.4 LBS | HEART RATE: 94 BPM | TEMPERATURE: 97.6 F | DIASTOLIC BLOOD PRESSURE: 74 MMHG | BODY MASS INDEX: 28.42 KG/M2 | OXYGEN SATURATION: 98 % | SYSTOLIC BLOOD PRESSURE: 106 MMHG | RESPIRATION RATE: 14 BRPM | HEIGHT: 63 IN

## 2019-02-21 DIAGNOSIS — K40.91 UNILATERAL RECURRENT INGUINAL HERNIA WITHOUT OBSTRUCTION OR GANGRENE: ICD-10-CM

## 2019-02-21 DIAGNOSIS — E11.9 TYPE 2 DIABETES MELLITUS WITHOUT COMPLICATION, WITHOUT LONG-TERM CURRENT USE OF INSULIN (HCC): Primary | ICD-10-CM

## 2019-02-21 DIAGNOSIS — E11.9 TYPE 2 DIABETES MELLITUS WITHOUT COMPLICATION, WITHOUT LONG-TERM CURRENT USE OF INSULIN (HCC): ICD-10-CM

## 2019-02-21 LAB
ALBUMIN SERPL-MCNC: 3.8 G/DL (ref 3.5–4.6)
ALP BLD-CCNC: 94 U/L (ref 40–130)
ALT SERPL-CCNC: 20 U/L (ref 0–33)
ANION GAP SERPL CALCULATED.3IONS-SCNC: 16 MEQ/L (ref 9–15)
AST SERPL-CCNC: 12 U/L (ref 0–35)
BILIRUB SERPL-MCNC: <0.2 MG/DL (ref 0.2–0.7)
BUN BLDV-MCNC: 17 MG/DL (ref 6–20)
CALCIUM SERPL-MCNC: 9 MG/DL (ref 8.5–9.9)
CHLORIDE BLD-SCNC: 102 MEQ/L (ref 95–107)
CHOLESTEROL, TOTAL: 170 MG/DL (ref 0–199)
CO2: 23 MEQ/L (ref 20–31)
CREAT SERPL-MCNC: 0.41 MG/DL (ref 0.5–0.9)
GFR AFRICAN AMERICAN: >60
GFR NON-AFRICAN AMERICAN: >60
GLOBULIN: 2.8 G/DL (ref 2.3–3.5)
GLUCOSE BLD-MCNC: 116 MG/DL (ref 70–99)
HBA1C MFR BLD: 6 %
HBA1C MFR BLD: 6.2 % (ref 4.8–5.9)
HDLC SERPL-MCNC: 49 MG/DL (ref 40–59)
LDL CHOLESTEROL CALCULATED: 108 MG/DL (ref 0–129)
POTASSIUM SERPL-SCNC: 4.3 MEQ/L (ref 3.4–4.9)
SODIUM BLD-SCNC: 141 MEQ/L (ref 135–144)
TOTAL PROTEIN: 6.6 G/DL (ref 6.3–8)
TRIGL SERPL-MCNC: 67 MG/DL (ref 0–150)

## 2019-02-21 PROCEDURE — 83036 HEMOGLOBIN GLYCOSYLATED A1C: CPT | Performed by: FAMILY MEDICINE

## 2019-02-21 PROCEDURE — G8484 FLU IMMUNIZE NO ADMIN: HCPCS | Performed by: FAMILY MEDICINE

## 2019-02-21 PROCEDURE — G8427 DOCREV CUR MEDS BY ELIG CLIN: HCPCS | Performed by: FAMILY MEDICINE

## 2019-02-21 PROCEDURE — 1036F TOBACCO NON-USER: CPT | Performed by: FAMILY MEDICINE

## 2019-02-21 PROCEDURE — 2022F DILAT RTA XM EVC RTNOPTHY: CPT | Performed by: FAMILY MEDICINE

## 2019-02-21 PROCEDURE — G8419 CALC BMI OUT NRM PARAM NOF/U: HCPCS | Performed by: FAMILY MEDICINE

## 2019-02-21 PROCEDURE — 99214 OFFICE O/P EST MOD 30 MIN: CPT | Performed by: FAMILY MEDICINE

## 2019-02-21 PROCEDURE — 3044F HG A1C LEVEL LT 7.0%: CPT | Performed by: FAMILY MEDICINE

## 2019-02-21 ASSESSMENT — PATIENT HEALTH QUESTIONNAIRE - PHQ9
2. FEELING DOWN, DEPRESSED OR HOPELESS: 0
SUM OF ALL RESPONSES TO PHQ QUESTIONS 1-9: 0
SUM OF ALL RESPONSES TO PHQ9 QUESTIONS 1 & 2: 0
SUM OF ALL RESPONSES TO PHQ QUESTIONS 1-9: 0
1. LITTLE INTEREST OR PLEASURE IN DOING THINGS: 0

## 2019-02-21 ASSESSMENT — ENCOUNTER SYMPTOMS
CHOKING: 0
COLOR CHANGE: 0
CHEST TIGHTNESS: 0
EYE REDNESS: 0
WHEEZING: 0
EYE PAIN: 0
NAUSEA: 0
DIARRHEA: 0
CONSTIPATION: 0
ABDOMINAL PAIN: 0
PHOTOPHOBIA: 0
STRIDOR: 0
EYE DISCHARGE: 0
APNEA: 0
FACIAL SWELLING: 0

## 2019-03-01 ENCOUNTER — OFFICE VISIT (OUTPATIENT)
Dept: SURGERY | Age: 44
End: 2019-03-01
Payer: COMMERCIAL

## 2019-03-01 VITALS
BODY MASS INDEX: 28.17 KG/M2 | OXYGEN SATURATION: 97 % | WEIGHT: 159 LBS | TEMPERATURE: 98.4 F | HEIGHT: 63 IN | HEART RATE: 100 BPM

## 2019-03-01 DIAGNOSIS — K40.90 NON-RECURRENT UNILATERAL INGUINAL HERNIA WITHOUT OBSTRUCTION OR GANGRENE: Primary | ICD-10-CM

## 2019-03-01 PROCEDURE — 99203 OFFICE O/P NEW LOW 30 MIN: CPT | Performed by: COLON & RECTAL SURGERY

## 2019-03-01 PROCEDURE — G8427 DOCREV CUR MEDS BY ELIG CLIN: HCPCS | Performed by: COLON & RECTAL SURGERY

## 2019-03-01 PROCEDURE — G8419 CALC BMI OUT NRM PARAM NOF/U: HCPCS | Performed by: COLON & RECTAL SURGERY

## 2019-03-01 PROCEDURE — 1036F TOBACCO NON-USER: CPT | Performed by: COLON & RECTAL SURGERY

## 2019-03-01 PROCEDURE — G8484 FLU IMMUNIZE NO ADMIN: HCPCS | Performed by: COLON & RECTAL SURGERY

## 2019-03-01 RX ORDER — IBUPROFEN 800 MG/1
TABLET ORAL
COMMUNITY
Start: 2019-02-28

## 2019-03-01 RX ORDER — AMOXICILLIN 500 MG/1
CAPSULE ORAL
COMMUNITY
Start: 2019-02-28 | End: 2019-03-03

## 2019-03-01 ASSESSMENT — ENCOUNTER SYMPTOMS
BLOOD IN STOOL: 0
ABDOMINAL DISTENTION: 0
SHORTNESS OF BREATH: 0
ABDOMINAL PAIN: 0
APNEA: 0
CHEST TIGHTNESS: 0
VOMITING: 0
CONSTIPATION: 0
DIARRHEA: 0
ANAL BLEEDING: 0

## 2019-03-03 ENCOUNTER — HOSPITAL ENCOUNTER (EMERGENCY)
Age: 44
Discharge: HOME | End: 2019-03-03
Attending: EMERGENCY MEDICINE
Payer: COMMERCIAL

## 2019-03-03 ENCOUNTER — APPOINTMENT (OUTPATIENT)
Dept: CT IMAGING | Age: 44
End: 2019-03-03
Payer: COMMERCIAL

## 2019-03-03 VITALS
TEMPERATURE: 98.1 F | WEIGHT: 158 LBS | DIASTOLIC BLOOD PRESSURE: 59 MMHG | HEART RATE: 96 BPM | OXYGEN SATURATION: 98 % | RESPIRATION RATE: 18 BRPM | HEIGHT: 62 IN | SYSTOLIC BLOOD PRESSURE: 106 MMHG | BODY MASS INDEX: 29.08 KG/M2

## 2019-03-03 DIAGNOSIS — K57.32 DIVERTICULITIS OF COLON: Primary | ICD-10-CM

## 2019-03-03 LAB
ALBUMIN SERPL-MCNC: 4 G/DL (ref 3.5–4.6)
ALP BLD-CCNC: 101 U/L (ref 40–130)
ALT SERPL-CCNC: 21 U/L (ref 0–33)
ANION GAP SERPL CALCULATED.3IONS-SCNC: 13 MEQ/L (ref 9–15)
AST SERPL-CCNC: 13 U/L (ref 0–35)
BASOPHILS ABSOLUTE: 0 K/UL (ref 0–0.2)
BASOPHILS RELATIVE PERCENT: 0.1 %
BILIRUB SERPL-MCNC: 0.4 MG/DL (ref 0.2–0.7)
BILIRUBIN URINE: NEGATIVE
BLOOD, URINE: NEGATIVE
BUN BLDV-MCNC: 14 MG/DL (ref 6–20)
CALCIUM SERPL-MCNC: 8.9 MG/DL (ref 8.5–9.9)
CHLORIDE BLD-SCNC: 101 MEQ/L (ref 95–107)
CLARITY: CLEAR
CO2: 25 MEQ/L (ref 20–31)
COLOR: YELLOW
CREAT SERPL-MCNC: 0.6 MG/DL (ref 0.5–0.9)
EOSINOPHILS ABSOLUTE: 0.1 K/UL (ref 0–0.7)
EOSINOPHILS RELATIVE PERCENT: 1.1 %
GFR AFRICAN AMERICAN: >60
GFR NON-AFRICAN AMERICAN: >60
GLOBULIN: 3.1 G/DL (ref 2.3–3.5)
GLUCOSE BLD-MCNC: 117 MG/DL (ref 70–99)
GLUCOSE URINE: 500 MG/DL
HCT VFR BLD CALC: 45.2 % (ref 37–47)
HEMOGLOBIN: 15.4 G/DL (ref 12–16)
KETONES, URINE: NEGATIVE MG/DL
LEUKOCYTE ESTERASE, URINE: NEGATIVE
LYMPHOCYTES ABSOLUTE: 1 K/UL (ref 1–4.8)
LYMPHOCYTES RELATIVE PERCENT: 11 %
MCH RBC QN AUTO: 28.4 PG (ref 27–31.3)
MCHC RBC AUTO-ENTMCNC: 33.9 % (ref 33–37)
MCV RBC AUTO: 83.8 FL (ref 82–100)
MONOCYTES ABSOLUTE: 0.5 K/UL (ref 0.2–0.8)
MONOCYTES RELATIVE PERCENT: 5.7 %
NEUTROPHILS ABSOLUTE: 7.8 K/UL (ref 1.4–6.5)
NEUTROPHILS RELATIVE PERCENT: 82.1 %
NITRITE, URINE: NEGATIVE
PDW BLD-RTO: 12.8 % (ref 11.5–14.5)
PH UA: 8.5 (ref 5–9)
PLATELET # BLD: 275 K/UL (ref 130–400)
POTASSIUM SERPL-SCNC: 4.1 MEQ/L (ref 3.4–4.9)
PROTEIN UA: NEGATIVE MG/DL
RBC # BLD: 5.4 M/UL (ref 4.2–5.4)
SODIUM BLD-SCNC: 139 MEQ/L (ref 135–144)
SPECIFIC GRAVITY UA: 1.01 (ref 1–1.03)
TOTAL PROTEIN: 7.1 G/DL (ref 6.3–8)
URINE REFLEX TO CULTURE: ABNORMAL
UROBILINOGEN, URINE: 0.2 E.U./DL
WBC # BLD: 9.5 K/UL (ref 4.8–10.8)

## 2019-03-03 PROCEDURE — 80053 COMPREHEN METABOLIC PANEL: CPT

## 2019-03-03 PROCEDURE — 99284 EMERGENCY DEPT VISIT MOD MDM: CPT

## 2019-03-03 PROCEDURE — 6370000000 HC RX 637 (ALT 250 FOR IP): Performed by: EMERGENCY MEDICINE

## 2019-03-03 PROCEDURE — 81003 URINALYSIS AUTO W/O SCOPE: CPT

## 2019-03-03 PROCEDURE — 36415 COLL VENOUS BLD VENIPUNCTURE: CPT

## 2019-03-03 PROCEDURE — 6360000002 HC RX W HCPCS: Performed by: EMERGENCY MEDICINE

## 2019-03-03 PROCEDURE — 85025 COMPLETE CBC W/AUTO DIFF WBC: CPT

## 2019-03-03 PROCEDURE — 2580000003 HC RX 258: Performed by: EMERGENCY MEDICINE

## 2019-03-03 PROCEDURE — 96375 TX/PRO/DX INJ NEW DRUG ADDON: CPT

## 2019-03-03 PROCEDURE — 96374 THER/PROPH/DIAG INJ IV PUSH: CPT

## 2019-03-03 PROCEDURE — 74176 CT ABD & PELVIS W/O CONTRAST: CPT

## 2019-03-03 RX ORDER — CIPROFLOXACIN 500 MG/1
500 TABLET, FILM COATED ORAL ONCE
Status: COMPLETED | OUTPATIENT
Start: 2019-03-03 | End: 2019-03-03

## 2019-03-03 RX ORDER — METRONIDAZOLE 500 MG/1
500 TABLET ORAL 3 TIMES DAILY
Qty: 21 TABLET | Refills: 0 | Status: SHIPPED | OUTPATIENT
Start: 2019-03-03 | End: 2019-03-13

## 2019-03-03 RX ORDER — HYDROCODONE BITARTRATE AND ACETAMINOPHEN 5; 325 MG/1; MG/1
1 TABLET ORAL EVERY 4 HOURS PRN
Qty: 18 TABLET | Refills: 0 | Status: SHIPPED | OUTPATIENT
Start: 2019-03-03 | End: 2019-03-06

## 2019-03-03 RX ORDER — MORPHINE SULFATE 4 MG/ML
INJECTION, SOLUTION INTRAMUSCULAR; INTRAVENOUS
Status: DISCONTINUED
Start: 2019-03-03 | End: 2019-03-03 | Stop reason: HOSPADM

## 2019-03-03 RX ORDER — ONDANSETRON 4 MG/1
4 TABLET, ORALLY DISINTEGRATING ORAL EVERY 8 HOURS PRN
Qty: 20 TABLET | Refills: 0 | Status: ON HOLD | OUTPATIENT
Start: 2019-03-03 | End: 2019-04-17 | Stop reason: ALTCHOICE

## 2019-03-03 RX ORDER — CIPROFLOXACIN 500 MG/1
500 TABLET, FILM COATED ORAL 2 TIMES DAILY
Qty: 14 TABLET | Refills: 0 | Status: SHIPPED | OUTPATIENT
Start: 2019-03-03 | End: 2019-03-10

## 2019-03-03 RX ORDER — 0.9 % SODIUM CHLORIDE 0.9 %
1000 INTRAVENOUS SOLUTION INTRAVENOUS ONCE
Status: COMPLETED | OUTPATIENT
Start: 2019-03-03 | End: 2019-03-03

## 2019-03-03 RX ORDER — KETOROLAC TROMETHAMINE 30 MG/ML
30 INJECTION, SOLUTION INTRAMUSCULAR; INTRAVENOUS ONCE
Status: COMPLETED | OUTPATIENT
Start: 2019-03-03 | End: 2019-03-03

## 2019-03-03 RX ORDER — METRONIDAZOLE 500 MG/1
500 TABLET ORAL ONCE
Status: COMPLETED | OUTPATIENT
Start: 2019-03-03 | End: 2019-03-03

## 2019-03-03 RX ORDER — MORPHINE SULFATE 1 MG/ML
4 INJECTION, SOLUTION EPIDURAL; INTRATHECAL; INTRAVENOUS ONCE
Status: COMPLETED | OUTPATIENT
Start: 2019-03-03 | End: 2019-03-03

## 2019-03-03 RX ADMIN — KETOROLAC TROMETHAMINE 30 MG: 30 INJECTION, SOLUTION INTRAMUSCULAR at 19:54

## 2019-03-03 RX ADMIN — SODIUM CHLORIDE 1000 ML: 9 INJECTION, SOLUTION INTRAVENOUS at 19:54

## 2019-03-03 RX ADMIN — METRONIDAZOLE 500 MG: 500 TABLET, FILM COATED ORAL at 20:07

## 2019-03-03 RX ADMIN — CIPROFLOXACIN HYDROCHLORIDE 500 MG: 500 TABLET, FILM COATED ORAL at 20:07

## 2019-03-03 RX ADMIN — MORPHINE SULFATE 4 MG: 1 INJECTION EPIDURAL; INTRATHECAL; INTRAVENOUS at 20:40

## 2019-03-03 ASSESSMENT — PAIN DESCRIPTION - LOCATION
LOCATION: ABDOMEN
LOCATION: ABDOMEN

## 2019-03-03 ASSESSMENT — PAIN DESCRIPTION - ORIENTATION: ORIENTATION: LEFT

## 2019-03-03 ASSESSMENT — PAIN - FUNCTIONAL ASSESSMENT
PAIN_FUNCTIONAL_ASSESSMENT: 0-10
PAIN_FUNCTIONAL_ASSESSMENT: PREVENTS OR INTERFERES SOME ACTIVE ACTIVITIES AND ADLS

## 2019-03-03 ASSESSMENT — PAIN DESCRIPTION - FREQUENCY: FREQUENCY: CONTINUOUS

## 2019-03-03 ASSESSMENT — PAIN SCALES - GENERAL
PAINLEVEL_OUTOF10: 10
PAINLEVEL_OUTOF10: 10
PAINLEVEL_OUTOF10: 8
PAINLEVEL_OUTOF10: 4

## 2019-03-03 ASSESSMENT — PAIN DESCRIPTION - ONSET: ONSET: ON-GOING

## 2019-03-03 ASSESSMENT — PAIN DESCRIPTION - DESCRIPTORS: DESCRIPTORS: ACHING

## 2019-03-03 ASSESSMENT — PAIN DESCRIPTION - PROGRESSION: CLINICAL_PROGRESSION: GRADUALLY IMPROVING

## 2019-03-15 ENCOUNTER — TELEPHONE (OUTPATIENT)
Dept: PRIMARY CARE CLINIC | Age: 44
End: 2019-03-15

## 2019-04-17 ENCOUNTER — ANESTHESIA (OUTPATIENT)
Dept: OPERATING ROOM | Age: 44
End: 2019-04-17
Payer: COMMERCIAL

## 2019-04-17 ENCOUNTER — ANESTHESIA EVENT (OUTPATIENT)
Dept: OPERATING ROOM | Age: 44
End: 2019-04-17
Payer: COMMERCIAL

## 2019-04-17 ENCOUNTER — HOSPITAL ENCOUNTER (OUTPATIENT)
Age: 44
Setting detail: OUTPATIENT SURGERY
Discharge: HOME | End: 2019-04-17
Attending: COLON & RECTAL SURGERY | Admitting: COLON & RECTAL SURGERY
Payer: COMMERCIAL

## 2019-04-17 VITALS — DIASTOLIC BLOOD PRESSURE: 64 MMHG | OXYGEN SATURATION: 100 % | SYSTOLIC BLOOD PRESSURE: 120 MMHG | TEMPERATURE: 96.3 F

## 2019-04-17 VITALS
OXYGEN SATURATION: 97 % | WEIGHT: 159 LBS | HEART RATE: 70 BPM | RESPIRATION RATE: 16 BRPM | BODY MASS INDEX: 28.17 KG/M2 | DIASTOLIC BLOOD PRESSURE: 67 MMHG | HEIGHT: 63 IN | SYSTOLIC BLOOD PRESSURE: 105 MMHG | TEMPERATURE: 97.5 F

## 2019-04-17 DIAGNOSIS — K40.90 UNILATERAL INGUINAL HERNIA WITHOUT OBSTRUCTION OR GANGRENE, RECURRENCE NOT SPECIFIED: Primary | ICD-10-CM

## 2019-04-17 DIAGNOSIS — K40.90 NON-RECURRENT UNILATERAL INGUINAL HERNIA WITHOUT OBSTRUCTION OR GANGRENE: ICD-10-CM

## 2019-04-17 LAB
GLUCOSE BLD-MCNC: 130 MG/DL (ref 60–115)
GLUCOSE BLD-MCNC: 156 MG/DL (ref 60–115)
PERFORMED ON: ABNORMAL
PERFORMED ON: ABNORMAL

## 2019-04-17 PROCEDURE — 7100000000 HC PACU RECOVERY - FIRST 15 MIN: Performed by: COLON & RECTAL SURGERY

## 2019-04-17 PROCEDURE — 6360000002 HC RX W HCPCS: Performed by: COLON & RECTAL SURGERY

## 2019-04-17 PROCEDURE — 3700000000 HC ANESTHESIA ATTENDED CARE: Performed by: COLON & RECTAL SURGERY

## 2019-04-17 PROCEDURE — 7100000001 HC PACU RECOVERY - ADDTL 15 MIN: Performed by: COLON & RECTAL SURGERY

## 2019-04-17 PROCEDURE — 2580000003 HC RX 258: Performed by: NURSE ANESTHETIST, CERTIFIED REGISTERED

## 2019-04-17 PROCEDURE — 2500000003 HC RX 250 WO HCPCS: Performed by: COLON & RECTAL SURGERY

## 2019-04-17 PROCEDURE — 6370000000 HC RX 637 (ALT 250 FOR IP): Performed by: ANESTHESIOLOGY

## 2019-04-17 PROCEDURE — 3700000001 HC ADD 15 MINUTES (ANESTHESIA): Performed by: COLON & RECTAL SURGERY

## 2019-04-17 PROCEDURE — 3600000014 HC SURGERY LEVEL 4 ADDTL 15MIN: Performed by: COLON & RECTAL SURGERY

## 2019-04-17 PROCEDURE — 49505 PRP I/HERN INIT REDUC >5 YR: CPT | Performed by: COLON & RECTAL SURGERY

## 2019-04-17 PROCEDURE — 7100000011 HC PHASE II RECOVERY - ADDTL 15 MIN: Performed by: COLON & RECTAL SURGERY

## 2019-04-17 PROCEDURE — 6360000002 HC RX W HCPCS: Performed by: NURSE ANESTHETIST, CERTIFIED REGISTERED

## 2019-04-17 PROCEDURE — 2580000003 HC RX 258: Performed by: COLON & RECTAL SURGERY

## 2019-04-17 PROCEDURE — 3600000004 HC SURGERY LEVEL 4 BASE: Performed by: COLON & RECTAL SURGERY

## 2019-04-17 PROCEDURE — 2709999900 HC NON-CHARGEABLE SUPPLY: Performed by: COLON & RECTAL SURGERY

## 2019-04-17 PROCEDURE — C1781 MESH (IMPLANTABLE): HCPCS | Performed by: COLON & RECTAL SURGERY

## 2019-04-17 PROCEDURE — 6370000000 HC RX 637 (ALT 250 FOR IP): Performed by: COLON & RECTAL SURGERY

## 2019-04-17 PROCEDURE — 7100000010 HC PHASE II RECOVERY - FIRST 15 MIN: Performed by: COLON & RECTAL SURGERY

## 2019-04-17 PROCEDURE — 6360000002 HC RX W HCPCS: Performed by: ANESTHESIOLOGY

## 2019-04-17 DEVICE — MESH HERN W1.3XL1.55IN M POLYPR INGUINAL NONABSORBABLE: Type: IMPLANTABLE DEVICE | Site: GROIN | Status: FUNCTIONAL

## 2019-04-17 RX ORDER — PROPOFOL 10 MG/ML
INJECTION, EMULSION INTRAVENOUS PRN
Status: DISCONTINUED | OUTPATIENT
Start: 2019-04-17 | End: 2019-04-17 | Stop reason: SDUPTHER

## 2019-04-17 RX ORDER — FENTANYL CITRATE 50 UG/ML
50 INJECTION, SOLUTION INTRAMUSCULAR; INTRAVENOUS EVERY 10 MIN PRN
Status: DISCONTINUED | OUTPATIENT
Start: 2019-04-17 | End: 2019-04-17 | Stop reason: HOSPADM

## 2019-04-17 RX ORDER — WOUND DRESSING ADHESIVE - LIQUID
LIQUID MISCELLANEOUS PRN
Status: DISCONTINUED | OUTPATIENT
Start: 2019-04-17 | End: 2019-04-17 | Stop reason: ALTCHOICE

## 2019-04-17 RX ORDER — MIDAZOLAM HYDROCHLORIDE 1 MG/ML
INJECTION INTRAMUSCULAR; INTRAVENOUS PRN
Status: DISCONTINUED | OUTPATIENT
Start: 2019-04-17 | End: 2019-04-17 | Stop reason: SDUPTHER

## 2019-04-17 RX ORDER — OXYCODONE HYDROCHLORIDE AND ACETAMINOPHEN 5; 325 MG/1; MG/1
1 TABLET ORAL EVERY 6 HOURS PRN
Qty: 20 TABLET | Refills: 0 | Status: SHIPPED | OUTPATIENT
Start: 2019-04-17 | End: 2019-04-20

## 2019-04-17 RX ORDER — HYDROCODONE BITARTRATE AND ACETAMINOPHEN 5; 325 MG/1; MG/1
2 TABLET ORAL PRN
Status: COMPLETED | OUTPATIENT
Start: 2019-04-17 | End: 2019-04-17

## 2019-04-17 RX ORDER — SODIUM CHLORIDE, SODIUM LACTATE, POTASSIUM CHLORIDE, CALCIUM CHLORIDE 600; 310; 30; 20 MG/100ML; MG/100ML; MG/100ML; MG/100ML
INJECTION, SOLUTION INTRAVENOUS CONTINUOUS PRN
Status: DISCONTINUED | OUTPATIENT
Start: 2019-04-17 | End: 2019-04-17 | Stop reason: SDUPTHER

## 2019-04-17 RX ORDER — HYDROCODONE BITARTRATE AND ACETAMINOPHEN 5; 325 MG/1; MG/1
1 TABLET ORAL EVERY 6 HOURS PRN
Qty: 20 TABLET | Refills: 0 | Status: SHIPPED | OUTPATIENT
Start: 2019-04-17 | End: 2019-04-20

## 2019-04-17 RX ORDER — ONDANSETRON 2 MG/ML
INJECTION INTRAMUSCULAR; INTRAVENOUS PRN
Status: DISCONTINUED | OUTPATIENT
Start: 2019-04-17 | End: 2019-04-17 | Stop reason: SDUPTHER

## 2019-04-17 RX ORDER — CEFAZOLIN SODIUM 2 G/50ML
2 SOLUTION INTRAVENOUS
Status: COMPLETED | OUTPATIENT
Start: 2019-04-17 | End: 2019-04-17

## 2019-04-17 RX ORDER — MEPERIDINE HYDROCHLORIDE 25 MG/ML
12.5 INJECTION INTRAMUSCULAR; INTRAVENOUS; SUBCUTANEOUS EVERY 5 MIN PRN
Status: DISCONTINUED | OUTPATIENT
Start: 2019-04-17 | End: 2019-04-17 | Stop reason: HOSPADM

## 2019-04-17 RX ORDER — MAGNESIUM HYDROXIDE 1200 MG/15ML
LIQUID ORAL CONTINUOUS PRN
Status: COMPLETED | OUTPATIENT
Start: 2019-04-17 | End: 2019-04-17

## 2019-04-17 RX ORDER — HYDROCODONE BITARTRATE AND ACETAMINOPHEN 5; 325 MG/1; MG/1
1 TABLET ORAL PRN
Status: COMPLETED | OUTPATIENT
Start: 2019-04-17 | End: 2019-04-17

## 2019-04-17 RX ORDER — LIDOCAINE HYDROCHLORIDE 20 MG/ML
INJECTION, SOLUTION INTRAVENOUS PRN
Status: DISCONTINUED | OUTPATIENT
Start: 2019-04-17 | End: 2019-04-17 | Stop reason: SDUPTHER

## 2019-04-17 RX ORDER — BUPIVACAINE HYDROCHLORIDE AND EPINEPHRINE 5; 5 MG/ML; UG/ML
INJECTION, SOLUTION EPIDURAL; INTRACAUDAL; PERINEURAL PRN
Status: DISCONTINUED | OUTPATIENT
Start: 2019-04-17 | End: 2019-04-17 | Stop reason: ALTCHOICE

## 2019-04-17 RX ORDER — DIPHENHYDRAMINE HYDROCHLORIDE 50 MG/ML
12.5 INJECTION INTRAMUSCULAR; INTRAVENOUS
Status: DISCONTINUED | OUTPATIENT
Start: 2019-04-17 | End: 2019-04-17 | Stop reason: HOSPADM

## 2019-04-17 RX ORDER — FENTANYL CITRATE 50 UG/ML
INJECTION, SOLUTION INTRAMUSCULAR; INTRAVENOUS PRN
Status: DISCONTINUED | OUTPATIENT
Start: 2019-04-17 | End: 2019-04-17 | Stop reason: SDUPTHER

## 2019-04-17 RX ORDER — METOCLOPRAMIDE HYDROCHLORIDE 5 MG/ML
10 INJECTION INTRAMUSCULAR; INTRAVENOUS
Status: DISCONTINUED | OUTPATIENT
Start: 2019-04-17 | End: 2019-04-17 | Stop reason: HOSPADM

## 2019-04-17 RX ORDER — KETOROLAC TROMETHAMINE 30 MG/ML
INJECTION, SOLUTION INTRAMUSCULAR; INTRAVENOUS PRN
Status: DISCONTINUED | OUTPATIENT
Start: 2019-04-17 | End: 2019-04-17 | Stop reason: SDUPTHER

## 2019-04-17 RX ORDER — ONDANSETRON 2 MG/ML
4 INJECTION INTRAMUSCULAR; INTRAVENOUS
Status: COMPLETED | OUTPATIENT
Start: 2019-04-17 | End: 2019-04-17

## 2019-04-17 RX ADMIN — FENTANYL CITRATE 25 MCG: 50 INJECTION, SOLUTION INTRAMUSCULAR; INTRAVENOUS at 08:04

## 2019-04-17 RX ADMIN — KETOROLAC TROMETHAMINE 30 MG: 30 INJECTION, SOLUTION INTRAMUSCULAR; INTRAVENOUS at 07:47

## 2019-04-17 RX ADMIN — ONDANSETRON 4 MG: 2 INJECTION INTRAMUSCULAR; INTRAVENOUS at 08:46

## 2019-04-17 RX ADMIN — HYDROCODONE BITARTRATE AND ACETAMINOPHEN 2 TABLET: 5; 325 TABLET ORAL at 09:37

## 2019-04-17 RX ADMIN — FENTANYL CITRATE 25 MCG: 50 INJECTION, SOLUTION INTRAMUSCULAR; INTRAVENOUS at 08:15

## 2019-04-17 RX ADMIN — ONDANSETRON 4 MG: 2 INJECTION INTRAMUSCULAR; INTRAVENOUS at 07:47

## 2019-04-17 RX ADMIN — PROPOFOL 40 MG: 10 INJECTION, EMULSION INTRAVENOUS at 07:45

## 2019-04-17 RX ADMIN — FENTANYL CITRATE 25 MCG: 50 INJECTION, SOLUTION INTRAMUSCULAR; INTRAVENOUS at 07:28

## 2019-04-17 RX ADMIN — SODIUM CHLORIDE, POTASSIUM CHLORIDE, SODIUM LACTATE AND CALCIUM CHLORIDE: 600; 310; 30; 20 INJECTION, SOLUTION INTRAVENOUS at 07:25

## 2019-04-17 RX ADMIN — PROPOFOL 60 MG: 10 INJECTION, EMULSION INTRAVENOUS at 07:51

## 2019-04-17 RX ADMIN — FENTANYL CITRATE 50 MCG: 50 INJECTION, SOLUTION INTRAMUSCULAR; INTRAVENOUS at 08:42

## 2019-04-17 RX ADMIN — LIDOCAINE HYDROCHLORIDE 100 MG: 20 INJECTION, SOLUTION INTRAVENOUS at 07:28

## 2019-04-17 RX ADMIN — FENTANYL CITRATE 50 MCG: 50 INJECTION, SOLUTION INTRAMUSCULAR; INTRAVENOUS at 07:30

## 2019-04-17 RX ADMIN — PROPOFOL 200 MG: 10 INJECTION, EMULSION INTRAVENOUS at 07:28

## 2019-04-17 RX ADMIN — FENTANYL CITRATE 25 MCG: 50 INJECTION, SOLUTION INTRAMUSCULAR; INTRAVENOUS at 07:40

## 2019-04-17 RX ADMIN — MIDAZOLAM HYDROCHLORIDE 2 MG: 1 INJECTION, SOLUTION INTRAMUSCULAR; INTRAVENOUS at 07:24

## 2019-04-17 RX ADMIN — FENTANYL CITRATE 50 MCG: 50 INJECTION, SOLUTION INTRAMUSCULAR; INTRAVENOUS at 08:52

## 2019-04-17 RX ADMIN — FENTANYL CITRATE 50 MCG: 50 INJECTION, SOLUTION INTRAMUSCULAR; INTRAVENOUS at 07:51

## 2019-04-17 RX ADMIN — CEFAZOLIN SODIUM 2 G: 2 SOLUTION INTRAVENOUS at 07:24

## 2019-04-17 ASSESSMENT — PULMONARY FUNCTION TESTS
PIF_VALUE: 12
PIF_VALUE: 15
PIF_VALUE: 2
PIF_VALUE: 11
PIF_VALUE: 1
PIF_VALUE: 5
PIF_VALUE: 17
PIF_VALUE: 17
PIF_VALUE: 11
PIF_VALUE: 12
PIF_VALUE: 15
PIF_VALUE: 1
PIF_VALUE: 11
PIF_VALUE: 16
PIF_VALUE: 11
PIF_VALUE: 16
PIF_VALUE: 12
PIF_VALUE: 1
PIF_VALUE: 11
PIF_VALUE: 15
PIF_VALUE: 12
PIF_VALUE: 15
PIF_VALUE: 3
PIF_VALUE: 12
PIF_VALUE: 17
PIF_VALUE: 12
PIF_VALUE: 16
PIF_VALUE: 13
PIF_VALUE: 11
PIF_VALUE: 12
PIF_VALUE: 16
PIF_VALUE: 21
PIF_VALUE: 14
PIF_VALUE: 11
PIF_VALUE: 12
PIF_VALUE: 10
PIF_VALUE: 15
PIF_VALUE: 12
PIF_VALUE: 15
PIF_VALUE: 13
PIF_VALUE: 12
PIF_VALUE: 16
PIF_VALUE: 2
PIF_VALUE: 16
PIF_VALUE: 13
PIF_VALUE: 11
PIF_VALUE: 16
PIF_VALUE: 1
PIF_VALUE: 11
PIF_VALUE: 4
PIF_VALUE: 12
PIF_VALUE: 12
PIF_VALUE: 13
PIF_VALUE: 12
PIF_VALUE: 13
PIF_VALUE: 11
PIF_VALUE: 15
PIF_VALUE: 15
PIF_VALUE: 12
PIF_VALUE: 16

## 2019-04-17 ASSESSMENT — PAIN SCALES - GENERAL
PAINLEVEL_OUTOF10: 6
PAINLEVEL_OUTOF10: 8
PAINLEVEL_OUTOF10: 8
PAINLEVEL_OUTOF10: 5
PAINLEVEL_OUTOF10: 5
PAINLEVEL_OUTOF10: 8

## 2019-04-17 ASSESSMENT — PAIN DESCRIPTION - PAIN TYPE
TYPE: SURGICAL PAIN
TYPE: SURGICAL PAIN

## 2019-04-17 ASSESSMENT — PAIN DESCRIPTION - ORIENTATION: ORIENTATION: RIGHT

## 2019-04-17 ASSESSMENT — PAIN DESCRIPTION - LOCATION: LOCATION: GROIN

## 2019-04-17 NOTE — PROGRESS NOTES
To room 19 accompanied by Tab Vaughn RN. Head of bed elevated for comfort. Taking drink and snack well. Nausea has subsided.

## 2019-04-17 NOTE — H&P
Patient ID: Tamara Luu is a 37 y.o. female who presents for:      Chief Complaint   Patient presents with    New Patient         This is a 77-year-old female who's had intermittent episodes of right groin pain and was told she had a inguinal hernia. She was referred for surgical evaluation.     She is otherwise healthy. She denies any recent medical issues. She takes no anticoagulation. She usually has a pinching sensation in the right groin at different periods of the day.   She denies any nausea or vomiting.     She had a previous mesh removal in the past in the right lower quadrant.           Past Medical History        Past Medical History:   Diagnosis Date    Contusion of left shoulder 6/16/2016    [facility]2014    Dysmenorrhea 6/29/2015    Fatigue 2/6/2015    Gestational diabetes, x 2 of 5 pregnancies 11/7/2014    Lateral epicondylitis of right elbow 1/24/2018    Lateral epicondylitis of right elbow 5/16/2018    S/P vaginal hysterectomy, 10/15 4/7/2016    Uterine fibroids, Dr Fonseca Hershobha 6/29/2015         Past Surgical History         Past Surgical History:   Procedure Laterality Date    BLADDER SUSPENSION        CHOLECYSTECTOMY        COLONOSCOPY        HEMORRHOID SURGERY   2001    HYSTERECTOMY             Social History               Social History    Marital status:        Spouse name: N/A    Number of children: N/A    Years of education: N/A          Occupational History    Not on file.            Social History Main Topics    Smoking status: Former Smoker    Smokeless tobacco: Never Used    Alcohol use No    Drug use: No    Sexual activity: Yes       Partners: Male           Other Topics Concern    Not on file          Social History Narrative    No narrative on file         Family History         Family History   Problem Relation Age of Onset    Asthma Mother      Depression Mother      Cancer Mother      Diabetes Maternal Grandmother      Depression Maternal Grandmother      Diabetes Paternal Grandmother      Diabetes Paternal Grandfather      Diabetes Paternal Aunt      Diabetes Paternal Uncle      High Blood Pressure Maternal Grandfather      Heart Disease Maternal Grandfather           Allergies:  Invokana [canagliflozin] and Metformin and related     Review of Systems   Constitutional: Negative for activity change, chills and unexpected weight change. Respiratory: Negative for apnea, chest tightness and shortness of breath. Gastrointestinal: Negative for abdominal distention, abdominal pain, anal bleeding, blood in stool, constipation, diarrhea and vomiting. Genitourinary: Negative for difficulty urinating. Musculoskeletal: Negative for arthralgias. Neurological: Negative for dizziness and headaches. Hematological: Does not bruise/bleed easily. Psychiatric/Behavioral: Negative for agitation and confusion.         Objective:   Pulse 100   Temp 98.4 °F (36.9 °C) (Temporal)   Ht 5' 2.5\" (1.588 m)   Wt 159 lb (72.1 kg)   LMP 06/25/2015   SpO2 97%   BMI 28.62 kg/m²      Physical Exam   Constitutional: She is oriented to person, place, and time. She appears well-developed and well-nourished. HENT:   Head: Normocephalic and atraumatic. Eyes: Pupils are equal, round, and reactive to light. Neck: Normal range of motion. Neck supple. No tracheal deviation present. Cardiovascular: Normal rate, regular rhythm and normal heart sounds. Exam reveals no gallop and no friction rub. No murmur heard. Pulmonary/Chest: Effort normal and breath sounds normal. No respiratory distress. She has no wheezes. She has no rales. She exhibits no tenderness. Abdominal: She exhibits no distension and no mass. There is no tenderness. There is no guarding. A hernia is present. She has a direct inguinal hernia in the right groin. No skin changes present. Musculoskeletal: Normal range of motion.    Lymphadenopathy:     She has no

## 2019-04-17 NOTE — ANESTHESIA POSTPROCEDURE EVALUATION
Department of Anesthesiology  Postprocedure Note    Patient: Paolo Record  MRN: [id number]  YOB: 1975  Date of evaluation: 4/17/2019  Time:  8:30 AM     Procedure Summary     Date:  04/17/19 Room / Location:  [address] [facility] OR    Anesthesia Start:  0725 Anesthesia Stop:      Procedure:  RIGHT INGUINAL HERNIA REPAIR WITH MESH (PAT ON ADMIT) (Right Groin) Diagnosis:  (RIGHT INGUINAL HERNIA)    Surgeon:  Selvin Lewis MD Responsible Provider:  Víctor Stone MD    Anesthesia Type:  general ASA Status:  2          Anesthesia Type: general    Chelsie Phase I: Chelsie Score: 10    Chelsie Phase II:      Last vitals: Reviewed and per EMR flowsheets.        Anesthesia Post Evaluation    Patient location during evaluation: bedside  Patient participation: complete - patient participated  Level of consciousness: awake and awake and alert  Airway patency: patent  Nausea & Vomiting: no nausea and no vomiting  Complications: no  Cardiovascular status: blood pressure returned to baseline and hemodynamically stable  Respiratory status: acceptable  Hydration status: euvolemic

## 2019-04-17 NOTE — BRIEF OP NOTE
Department of General Surgery - Adult  Surgical Service Gen. surgery  Brief Operative Report      Pre-operative Diagnosis:  Right inguinal hernia    Post-operative Diagnosis:  Direct right inguinal hernia    Procedure:  Right inguinal hernia repair with mesh    Surgeon:  Rahat Carranza     Assistant(s):  Dalia    Anesthesia:  General endotrachial anesthesia    Estimated blood loss:  20    Blood Transfusion?:  No    Total Urine Output:  Not recorded     Drains:  None    Specimens:  None    Implants:  Medium mesh plug      Findings:  Direct inguinal hernia    Complications:  None    Condition:  stable    See dictated operative report for full details.

## 2019-04-17 NOTE — ANESTHESIA PRE PROCEDURE
Department of Anesthesiology  Preprocedure Note       Name:  Kiel Becker   Age:  37 y.o.  :  1975                                          MRN:  [id number]         Date:  2019      Surgeon: Mohsen Galo):  Salvador Dias MD    Procedure: RIGHT INGUINAL HERNIA REPAIR WITH MESH (PAT ON ADMIT) (Right )    Medications prior to admission:   Prior to Admission medications    Medication Sig Start Date End Date Taking? Authorizing Provider   Multiple Vitamins-Calcium (ONE-A-DAY WOMENS PO) Take 1 tablet by mouth   Yes Historical Provider, MD   ibuprofen (ADVIL;MOTRIN) 800 MG tablet  19  Yes Historical Provider, MD   empagliflozin (JARDIANCE) 25 MG tablet TAKE 1 TABLET BY MOUTH DAILY 18  Yes Archana Fernández,    Insulin Pen Needle (BD PEN NEEDLE SIDNEY U/F) 32G X 4 MM MISC USE AS DIRECTED WITH VICTOZA 8/10/18  Yes Yehuda Fernández,    Dulaglutide (TRULICITY) 0.25 NU/4.3YG SOPN Inject 0.75 mg into the skin every 7 days  Patient not taking: Reported on 2019   Frebarbaraa Fears, DO       Current medications:    Current Facility-Administered Medications   Medication Dose Route Frequency Provider Last Rate Last Dose    ceFAZolin (ANCEF) 2 g in dextrose 3 % 50 mL IVPB (duplex)  2 g Intravenous On Call to Nelida Marsh MD           Allergies:     Allergies   Allergen Reactions    Invokana [Canagliflozin] Other (See Comments)     UTI, yeast infections    Metformin And Related Other (See Comments)     Stomach cramps       Problem List:    Patient Active Problem List   Diagnosis Code    Gestational diabetes, x 2 of 5 pregnancies O24.419    DM [facility] E11.9    Fatigue R53.83    Uterine fibroids, Dr Emiliano Reagan D25.9    Dysmenorrhea N94.6    S/P vaginal hysterectomy, 10/15 Z90.710    Contusion of left shoulder S40.012A    Lateral epicondylitis of right elbow M77.11    Lateral epicondylitis of right elbow M77.11       Past Medical History:        Diagnosis Date    Contusion of left shoulder 6/16/2016    DM (diabetes mellitus) ([address] 11/7/2014    Dysmenorrhea 6/29/2015    Fatigue 2/6/2015    Gestational diabetes, x 2 of 5 pregnancies 11/7/2014    Lateral epicondylitis of right elbow 1/24/2018    Lateral epicondylitis of right elbow 5/16/2018    S/P vaginal hysterectomy, 10/15 4/7/2016    Uterine fibroids, Dr Alexander Lo 6/29/2015       Past Surgical History:        Procedure Laterality Date    BLADDER SUSPENSION      CHOLECYSTECTOMY      COLONOSCOPY      HEMORRHOID SURGERY  2001    HYSTERECTOMY         Social History:    Social History     Tobacco Use    Smoking status: Former Smoker    Smokeless tobacco: Never Used   Substance Use Topics    Alcohol use: No                                Counseling given: Not Answered      Vital Signs (Current):   Vitals:    04/17/19 0606   BP: 102/74   Pulse: 85   Resp: 16   Temp: 97.8 °F (36.6 °C)   TempSrc: Temporal   SpO2: 97%   Weight: 159 lb (72.1 kg)   Height: 5' 2.5\" (1.588 m)                                              BP Readings from Last 3 Encounters:   04/17/19 102/74   03/03/19 (!) 106/59   02/21/19 106/74       NPO Status: Time of last liquid consumption: 2100                        Time of last solid consumption: 1900                        Date of last liquid consumption: 04/16/19                        Date of last solid food consumption: 04/16/19    BMI:   Wt Readings from Last 3 Encounters:   04/17/19 159 lb (72.1 kg)   03/03/19 158 lb (71.7 kg)   03/01/19 159 lb (72.1 kg)     Body mass index is 28.62 kg/m².     CBC:   Lab Results   Component Value Date    WBC 9.5 03/03/2019    RBC 5.40 03/03/2019    RBC 5.09 12/21/2011    HGB 15.4 03/03/2019    HCT 45.2 03/03/2019    MCV 83.8 03/03/2019    RDW 12.8 03/03/2019     03/03/2019       CMP:   Lab Results   Component Value Date     03/03/2019    K 4.1 03/03/2019     03/03/2019    CO2 25 03/03/2019    BUN 14 03/03/2019    CREATININE 0.60 03/03/2019

## 2019-04-17 NOTE — OP NOTE
[address]                      [facility], [address] [facility]                                OPERATIVE REPORT    PATIENT NAME: Frank Odell                      :        1975  MED REC NO:   [id number]                            ROOM:  ACCOUNT NO:   [de-identified]                           ADMIT DATE: 2019  PROVIDER:     Keely Ye MD    DATE OF PROCEDURE:  2019    PREOPERATIVE DIAGNOSIS:  Right inguinal hernia. POSTOPERATIVE DIAGNOSIS:  Direct right inguinal hernia. OPERATION PERFORMED:  Right inguinal hernia repair with mesh. SURGEON:  Keely Ye MD    ANESTHESIA:  General.    SPECIMENS:  None. ESTIMATED BLOOD LOSS:  20 mL. COMPLICATIONS  None. INDICATIONS:  This is a 47-year-old female with a symptomatic right  inguinal hernia seen in the office. Risks and benefits of repair were  described. Risks of infection, bleeding, damage to surrounding nerves  with postoperative numbness and pain, possibly chronic, and recurrence  were outlined. Despite these risks, she wished to proceed. Consent  obtained. OPERATIVE PROCEDURE:  She was taken to the operating room, placed in the  supine position. General anesthesia was administered. Her right groin  was prepped and draped with a ChloraPrep-containing solution. She  received 2 g of Ancef preoperatively. A time-out was taken for  appropriate verification. An incision was made above the inguinal ligament. Subcutaneous tissues  were incised down through Catherine fascia to the external oblique. The  external oblique was opened under direct visualization. The round ligament was identified. The ilioinguinal nerve was   and the round ligament was divided and oversewn using 0 Vicryl suture. There was no evidence of a femoral hernia present. There was a week  transversalis fascia, which was reinforced with a medium mesh plug.   It  was sutured to the surrounding fascia including the fascia of the  internal oblique, the periosteum of the pubic tubercle, and the shelving  edge of the inguinal ligament circumferentially. The repair was  complete, intact without tension and excellent hemostasis was assured. Irrigation was performed using saline. The external oblique was closed  using a running 2-0 Vicryl suture. Catherine fascia was closed using a  running 3-0 Vicryl suture. The skin incision was closed using a 4-0  Biosyn in a subcuticular fashion. Steri-Strips, Mastisol, and sterile  dressings were applied. At the end of the procedure, all laps, needle, and instrument counts  were correct. Marcaine 0.25% was injected as a regional and local  block. The patient was extubated from the operating room to recovery for postop  monitoring.       Julianna Severin, MD    D: 04/17/2019 8:34:58       T: 04/17/2019 8:36:21     [id number]  Job#: [id number]     Doc#: [id number]    CC:

## 2019-04-19 DIAGNOSIS — K40.90 NON-RECURRENT UNILATERAL INGUINAL HERNIA WITHOUT OBSTRUCTION OR GANGRENE: Primary | ICD-10-CM

## 2019-04-19 RX ORDER — HYDROCODONE BITARTRATE AND ACETAMINOPHEN 5; 325 MG/1; MG/1
1 TABLET ORAL EVERY 6 HOURS PRN
Qty: 15 TABLET | Refills: 0 | Status: SHIPPED | OUTPATIENT
Start: 2019-04-19 | End: 2019-04-22

## 2019-04-23 ENCOUNTER — HOSPITAL ENCOUNTER (EMERGENCY)
Age: 44
Discharge: HOME | End: 2019-04-23
Attending: EMERGENCY MEDICINE
Payer: COMMERCIAL

## 2019-04-23 ENCOUNTER — APPOINTMENT (OUTPATIENT)
Dept: CT IMAGING | Age: 44
End: 2019-04-23
Payer: COMMERCIAL

## 2019-04-23 VITALS
HEIGHT: 62 IN | DIASTOLIC BLOOD PRESSURE: 76 MMHG | BODY MASS INDEX: 28.52 KG/M2 | HEART RATE: 79 BPM | OXYGEN SATURATION: 97 % | WEIGHT: 155 LBS | RESPIRATION RATE: 18 BRPM | SYSTOLIC BLOOD PRESSURE: 132 MMHG | TEMPERATURE: 98.8 F

## 2019-04-23 DIAGNOSIS — R10.9 ABDOMINAL PAIN, UNSPECIFIED ABDOMINAL LOCATION: Primary | ICD-10-CM

## 2019-04-23 LAB
ALBUMIN SERPL-MCNC: 4 G/DL (ref 3.5–4.6)
ALP BLD-CCNC: 103 U/L (ref 40–130)
ALT SERPL-CCNC: 24 U/L (ref 0–33)
ANION GAP SERPL CALCULATED.3IONS-SCNC: 14 MEQ/L (ref 9–15)
AST SERPL-CCNC: 15 U/L (ref 0–35)
BASOPHILS ABSOLUTE: 0.1 K/UL (ref 0–0.2)
BASOPHILS RELATIVE PERCENT: 1.1 %
BILIRUB SERPL-MCNC: <0.2 MG/DL (ref 0.2–0.7)
BILIRUBIN URINE: NEGATIVE
BLOOD, URINE: NEGATIVE
BUN BLDV-MCNC: 14 MG/DL (ref 6–20)
CALCIUM SERPL-MCNC: 9.2 MG/DL (ref 8.5–9.9)
CHLORIDE BLD-SCNC: 104 MEQ/L (ref 95–107)
CLARITY: CLEAR
CO2: 23 MEQ/L (ref 20–31)
COLOR: YELLOW
CREAT SERPL-MCNC: 0.59 MG/DL (ref 0.5–0.9)
EOSINOPHILS ABSOLUTE: 0.3 K/UL (ref 0–0.7)
EOSINOPHILS RELATIVE PERCENT: 4.1 %
GFR AFRICAN AMERICAN: >60
GFR NON-AFRICAN AMERICAN: >60
GLOBULIN: 2.9 G/DL (ref 2.3–3.5)
GLUCOSE BLD-MCNC: 151 MG/DL (ref 70–99)
GLUCOSE URINE: >=1000 MG/DL
HCG, URINE, POC: NEGATIVE
HCT VFR BLD CALC: 42.7 % (ref 37–47)
HEMOGLOBIN: 14.6 G/DL (ref 12–16)
KETONES, URINE: NEGATIVE MG/DL
LEUKOCYTE ESTERASE, URINE: NEGATIVE
LIPASE: 32 U/L (ref 12–95)
LYMPHOCYTES ABSOLUTE: 1.4 K/UL (ref 1–4.8)
LYMPHOCYTES RELATIVE PERCENT: 22.3 %
Lab: NORMAL
MCH RBC QN AUTO: 29 PG (ref 27–31.3)
MCHC RBC AUTO-ENTMCNC: 34.3 % (ref 33–37)
MCV RBC AUTO: 84.5 FL (ref 82–100)
MONOCYTES ABSOLUTE: 0.5 K/UL (ref 0.2–0.8)
MONOCYTES RELATIVE PERCENT: 7.2 %
NEGATIVE QC PASS/FAIL: NORMAL
NEUTROPHILS ABSOLUTE: 4.1 K/UL (ref 1.4–6.5)
NEUTROPHILS RELATIVE PERCENT: 65.3 %
NITRITE, URINE: NEGATIVE
PDW BLD-RTO: 12.8 % (ref 11.5–14.5)
PH UA: 5 (ref 5–9)
PLATELET # BLD: 292 K/UL (ref 130–400)
POSITIVE QC PASS/FAIL: NORMAL
POTASSIUM SERPL-SCNC: 4.6 MEQ/L (ref 3.4–4.9)
PROTEIN UA: NEGATIVE MG/DL
RBC # BLD: 5.05 M/UL (ref 4.2–5.4)
SODIUM BLD-SCNC: 141 MEQ/L (ref 135–144)
SPECIFIC GRAVITY UA: 1.04 (ref 1–1.03)
TOTAL PROTEIN: 6.9 G/DL (ref 6.3–8)
URINE REFLEX TO CULTURE: ABNORMAL
UROBILINOGEN, URINE: 0.2 E.U./DL
WBC # BLD: 6.3 K/UL (ref 4.8–10.8)

## 2019-04-23 PROCEDURE — 96372 THER/PROPH/DIAG INJ SC/IM: CPT

## 2019-04-23 PROCEDURE — 96374 THER/PROPH/DIAG INJ IV PUSH: CPT

## 2019-04-23 PROCEDURE — 81003 URINALYSIS AUTO W/O SCOPE: CPT

## 2019-04-23 PROCEDURE — 96375 TX/PRO/DX INJ NEW DRUG ADDON: CPT

## 2019-04-23 PROCEDURE — 83690 ASSAY OF LIPASE: CPT

## 2019-04-23 PROCEDURE — 2580000003 HC RX 258: Performed by: EMERGENCY MEDICINE

## 2019-04-23 PROCEDURE — 80053 COMPREHEN METABOLIC PANEL: CPT

## 2019-04-23 PROCEDURE — 36415 COLL VENOUS BLD VENIPUNCTURE: CPT

## 2019-04-23 PROCEDURE — 6360000004 HC RX CONTRAST MEDICATION: Performed by: EMERGENCY MEDICINE

## 2019-04-23 PROCEDURE — 99284 EMERGENCY DEPT VISIT MOD MDM: CPT

## 2019-04-23 PROCEDURE — 74177 CT ABD & PELVIS W/CONTRAST: CPT

## 2019-04-23 PROCEDURE — 85025 COMPLETE CBC W/AUTO DIFF WBC: CPT

## 2019-04-23 PROCEDURE — 6360000002 HC RX W HCPCS: Performed by: EMERGENCY MEDICINE

## 2019-04-23 RX ORDER — KETOROLAC TROMETHAMINE 30 MG/ML
30 INJECTION, SOLUTION INTRAMUSCULAR; INTRAVENOUS ONCE
Status: COMPLETED | OUTPATIENT
Start: 2019-04-23 | End: 2019-04-23

## 2019-04-23 RX ORDER — 0.9 % SODIUM CHLORIDE 0.9 %
1000 INTRAVENOUS SOLUTION INTRAVENOUS ONCE
Status: COMPLETED | OUTPATIENT
Start: 2019-04-23 | End: 2019-04-23

## 2019-04-23 RX ORDER — PROMETHAZINE HYDROCHLORIDE 25 MG/ML
25 INJECTION, SOLUTION INTRAMUSCULAR; INTRAVENOUS ONCE
Status: COMPLETED | OUTPATIENT
Start: 2019-04-23 | End: 2019-04-23

## 2019-04-23 RX ORDER — ONDANSETRON 2 MG/ML
4 INJECTION INTRAMUSCULAR; INTRAVENOUS ONCE
Status: COMPLETED | OUTPATIENT
Start: 2019-04-23 | End: 2019-04-23

## 2019-04-23 RX ORDER — MORPHINE SULFATE 2 MG/ML
4 INJECTION, SOLUTION INTRAMUSCULAR; INTRAVENOUS
Status: DISCONTINUED | OUTPATIENT
Start: 2019-04-23 | End: 2019-04-23 | Stop reason: HOSPADM

## 2019-04-23 RX ADMIN — HYDROMORPHONE HYDROCHLORIDE 1 MG: 1 INJECTION, SOLUTION INTRAMUSCULAR; INTRAVENOUS; SUBCUTANEOUS at 20:00

## 2019-04-23 RX ADMIN — SODIUM CHLORIDE 1000 ML: 9 INJECTION, SOLUTION INTRAVENOUS at 18:07

## 2019-04-23 RX ADMIN — MORPHINE SULFATE 4 MG: 2 INJECTION, SOLUTION INTRAMUSCULAR; INTRAVENOUS at 18:07

## 2019-04-23 RX ADMIN — PROMETHAZINE HYDROCHLORIDE 25 MG: 25 INJECTION, SOLUTION INTRAMUSCULAR; INTRAVENOUS at 19:56

## 2019-04-23 RX ADMIN — ONDANSETRON 4 MG: 2 INJECTION INTRAMUSCULAR; INTRAVENOUS at 18:07

## 2019-04-23 RX ADMIN — IOPAMIDOL 100 ML: 612 INJECTION, SOLUTION INTRAVENOUS at 18:33

## 2019-04-23 RX ADMIN — KETOROLAC TROMETHAMINE 30 MG: 30 INJECTION, SOLUTION INTRAMUSCULAR at 18:07

## 2019-04-23 ASSESSMENT — ENCOUNTER SYMPTOMS
BACK PAIN: 0
VOMITING: 0
NAUSEA: 0
SHORTNESS OF BREATH: 0
SORE THROAT: 0
ABDOMINAL PAIN: 1
COUGH: 0
DIARRHEA: 0

## 2019-04-23 ASSESSMENT — PAIN DESCRIPTION - LOCATION: LOCATION: GROIN

## 2019-04-23 ASSESSMENT — PAIN DESCRIPTION - PAIN TYPE: TYPE: ACUTE PAIN

## 2019-04-23 ASSESSMENT — PAIN SCALES - GENERAL
PAINLEVEL_OUTOF10: 7
PAINLEVEL_OUTOF10: 10
PAINLEVEL_OUTOF10: 10

## 2019-04-23 ASSESSMENT — PAIN DESCRIPTION - FREQUENCY: FREQUENCY: INTERMITTENT

## 2019-04-23 ASSESSMENT — PAIN DESCRIPTION - PROGRESSION: CLINICAL_PROGRESSION: NOT CHANGED

## 2019-04-23 ASSESSMENT — PAIN DESCRIPTION - ONSET: ONSET: ON-GOING

## 2019-04-23 NOTE — ED PROVIDER NOTES
[address] [facility] ED  eMERGENCYdEPARTMENT eNCOUnter      Pt Name: Aden Banerjee  MRN: [id number]  [place] 1975  Date of evaluation: 4/23/2019  Andres Humphreys MD    CHIEF COMPLAINT           HPI  Selena Mendoza is a 37 y.o. female per chart review has a h/o DM II, HTN, Hpl presents to the ED with ab pain. Pt had a R inguinal hernia repair 4/17. Pt notes gradual onset, moderate, constant, burning, RLQ ab pain since the surgery. +Chills today. Pt denies fever, n/v, cp, sob, dysuria, diarrhea. ROS  Review of Systems   Constitutional: Positive for chills. Negative for activity change and fever. HENT: Negative for ear pain and sore throat. Eyes: Negative for visual disturbance. Respiratory: Negative for cough and shortness of breath. Cardiovascular: Negative for chest pain, palpitations and leg swelling. Gastrointestinal: Positive for abdominal pain. Negative for diarrhea, nausea and vomiting. Genitourinary: Negative for dysuria. Musculoskeletal: Negative for back pain. Skin: Negative for rash. Neurological: Negative for dizziness and weakness. Except as noted above the remainder of the review of systems was reviewed and negative.        PAST MEDICAL HISTORY     Past Medical History:   Diagnosis Date    Contusion of left shoulder 6/16/2016    [facility] 11/7[facility]2014    Dysmenorrhea 6/29/2015    Fatigue 2/6/2015    Gestational diabetes, x 2 of 5 pregnancies 11/7/2014    Lateral epicondylitis of right elbow 1/24/2018    Lateral epicondylitis of right elbow 5/16/2018    S/P vaginal hysterectomy, 10/15 4/7/2016    Uterine fibroids, Dr Amira Virk 6/29/2015         SURGICAL HISTORY       Past Surgical History:   Procedure Laterality Date    BLADDER SUSPENSION      CHOLECYSTECTOMY      COLONOSCOPY      HEMORRHOID SURGERY  2001    HERNIA REPAIR Right 4/17/2019    RIGHT INGUINAL HERNIA REPAIR WITH MESH (PAT ON ADMIT) performed by Nazanin Che MD at [facility]    HYSTERECTOMY           CURRENTMEDICATIONS       Previous Medications    DULAGLUTIDE (TRULICITY) 5.56 AI/3.3NH SOPN    Inject 0.75 mg into the skin every 7 days    EMPAGLIFLOZIN (JARDIANCE) 25 MG TABLET    TAKE 1 TABLET BY MOUTH DAILY    IBUPROFEN (ADVIL;MOTRIN) 800 MG TABLET        INSULIN PEN NEEDLE (BD PEN NEEDLE SIDNEY U/F) 32G X 4 MM MISC    USE AS DIRECTED WITH VICTOZA    MULTIPLE VITAMINS-CALCIUM (ONE-A-DAY WOMENS PO)    Take 1 tablet by mouth       ALLERGIES     Invokana [canagliflozin] and Metformin and related    FAMILY HISTORY       Family History   Problem Relation Age of Onset    Asthma Mother     Depression Mother     Cancer Mother     Diabetes Maternal Grandmother     Depression Maternal Grandmother     Diabetes Paternal Grandmother     Diabetes Paternal Grandfather     Diabetes Paternal Aunt     Diabetes Paternal Uncle     High Blood Pressure Maternal Grandfather     Heart Disease Maternal Grandfather           SOCIAL HISTORY       Social History     Socioeconomic History    Marital status:      Spouse name: None    Number of children: None    Years of education: None    Highest education level: None   Occupational History    None   Social Needs    Financial resource strain: None    Food insecurity:     Worry: None     Inability: None    Transportation needs:     Medical: None     Non-medical: None   Tobacco Use    Smoking status: Former Smoker    Smokeless tobacco: Never Used   Substance and Sexual Activity    Alcohol use: No    Drug use: No    Sexual activity: Yes     Partners: Male   Lifestyle    Physical activity:     Days per week: None     Minutes per session: None    Stress: None   Relationships    Social connections:     Talks on phone: None     Gets together: None     Attends [employer] service: None     Active member of club or organization: None     Attends meetings of clubs or organizations: None     Relationship status: None    Intimate partner violence:     Fear of current or ex partner: None     Emotionally abused: None     Physically abused: None     Forced sexual activity: None   Other Topics Concern    None   Social History Narrative    None         PHYSICAL EXAM       ED Triage Vitals [04/23/19 1719]   BP Temp Temp Source Pulse Resp SpO2 Height Weight   (!) 144/79 98.8 °F (37.1 °C) Oral 83 16 98 % 5' 2\" (1.575 m) 155 lb (70.3 kg)       Physical Exam   Constitutional: She is oriented to person, place, and time. She appears well-developed. HENT:   Head: Normocephalic. Right Ear: External ear normal.   Left Ear: External ear normal.   Mouth/Throat: Oropharynx is clear and moist.   Eyes: Pupils are equal, round, and reactive to light. Conjunctivae are normal.   Neck: Normal range of motion. Neck supple. Cardiovascular: Normal rate, regular rhythm and normal heart sounds. Pulmonary/Chest: Effort normal and breath sounds normal.   Abdominal:   Incision c/d/i. Moderate tenderness to palpation over RLQ. No rebound, guarding, peritoneal signs. Musculoskeletal: Normal range of motion. Neurological: She is alert and oriented to person, place, and time. Skin: Skin is warm and dry. Psychiatric: She has a normal mood and affect. Nursing note and vitals reviewed. MDM  36 yo female presents to the ED with ab pain, chills. Pt is afebrile, hemodynamically stable. Pt given 1 L NS, IV morphine, IV zofran, IV toradol with moderate relief. Labs unremarkable. CT AP negative. Pt reassessed and pain is much better however pt still with nausea. Pt given IM phenergan with complete relief of ab pain. Case discussed with Dr. Leila Cuevas who felt that pt was stable to go home. Pt likely with normal post op pain/swelling. Pt thinks she returned to work too early. Pt only took 3-4 days off. Pt educated about the results. Pt has a f/u appt with Dr. Audie Rodriguez on Friday. Pt given work note till Friday, ab pain warning signs and f/u with pcp.   Pt understands plan. FINAL IMPRESSION      1.  Abdominal pain, unspecified abdominal location          DISPOSITION/PLAN   DISPOSITION Decision To Discharge 04/23/2019 07:53:30 PM        DISCHARGE MEDICATIONS:  [unfilled]         Brenna Hidalgo MD(electronically signed)  Attending Emergency Physician            Brenna Hidalgo MD  04/23/19 1769

## 2019-04-24 NOTE — ED NOTES
D/c instructions given by EG RN. Pt was given given d/c instructions, follow up care instructions, and prescriptions. Pt was a+ox4, no signs of distress, and was ambulatory on exit. Pt and family had no questions and state understanding of information given. Pt was advised to come back with any change in condition or if new symptoms arise.       Brie Persaud RN  04/23/19 2007

## 2019-04-26 ENCOUNTER — OFFICE VISIT (OUTPATIENT)
Dept: SURGERY | Age: 44
End: 2019-04-26

## 2019-04-26 VITALS
TEMPERATURE: 96.4 F | HEIGHT: 62 IN | BODY MASS INDEX: 29.81 KG/M2 | WEIGHT: 162 LBS | HEART RATE: 106 BPM | OXYGEN SATURATION: 97 %

## 2019-04-26 DIAGNOSIS — K40.90 NON-RECURRENT UNILATERAL INGUINAL HERNIA WITHOUT OBSTRUCTION OR GANGRENE: Primary | ICD-10-CM

## 2019-04-26 PROCEDURE — 99024 POSTOP FOLLOW-UP VISIT: CPT | Performed by: COLON & RECTAL SURGERY

## 2019-04-26 NOTE — PROGRESS NOTES
Subjective:      Patient ID: Akhil King is a 37 y.o. female who presents for:  Chief Complaint   Patient presents with    Post-Op Check       She returns to the office 9 days out from a right inguinal hernia repair. She is doing better. Last Friday she went to the emergency department where a full workup for her pain was unremarkable. The numbness around the incision has improved. Pain is nonnarcotically controlled currently. Patient is eating. Bowels are working.       Past Medical History:   Diagnosis Date    Contusion of left shoulder 6/16/2016    DM (diabetes mellitus) ([address] 11/7/2014    Dysmenorrhea 6/29/2015    Fatigue 2/6/2015    Gestational diabetes, x 2 of 5 pregnancies 11/7/2014    Lateral epicondylitis of right elbow 1/24/2018    Lateral epicondylitis of right elbow 5/16/2018    S/P vaginal hysterectomy, 10/15 4/7/2016    Uterine fibroids, Dr Nellie Hatfield 6/29/2015     Past Surgical History:   Procedure Laterality Date    BLADDER SUSPENSION      CHOLECYSTECTOMY      COLONOSCOPY      HEMORRHOID SURGERY  2001    HERNIA REPAIR Right 4/17/2019    RIGHT INGUINAL HERNIA REPAIR WITH MESH (PAT ON ADMIT) [facility] History     Socioeconomic History    Marital status:      Spouse name: Not on file    Number of children: Not on file    Years of education: Not on file    Highest education level: Not on file   Occupational History    Not on file   Social Needs    Financial resource strain: Not on file    Food insecurity:     Worry: Not on file     Inability: Not on file    Transportation needs:     Medical: Not on file     Non-medical: Not on file   Tobacco Use    Smoking status: Former Smoker    Smokeless tobacco: Never Used   Substance and Sexual Activity    Alcohol use: No    Drug use: No    Sexual activity: Yes     Partners: Male   Lifestyle    Physical activity:     Days per week: Not on file     Minutes per session: Not on file    Stress: Not on file   Relationships    Social connections:     Talks on phone: Not on file     Gets together: Not on file     Attends Yarsani service: Not on file     Active member of club or organization: Not on file     Attends meetings of clubs or organizations: Not on file     Relationship status: Not on file    Intimate partner violence:     Fear of current or ex partner: Not on file     Emotionally abused: Not on file     Physically abused: Not on file     Forced sexual activity: Not on file   Other Topics Concern    Not on file   Social History Narrative    Not on file     Family History   Problem Relation Age of Onset    Asthma Mother     Depression Mother     Cancer Mother     Diabetes Maternal Grandmother     Depression Maternal Grandmother     Diabetes Paternal Grandmother     Diabetes Paternal Grandfather     Diabetes Paternal Aunt     Diabetes Paternal Uncle     High Blood Pressure Maternal Grandfather     Heart Disease Maternal Grandfather      Allergies:  Invokana [canagliflozin] and Metformin and related    Review of Systems    Objective:    Pulse 106   Temp 96.4 °F (35.8 °C) (Temporal)   Ht 5' 2\" (1.575 m)   Wt 162 lb (73.5 kg)   LMP 06/25/2015   SpO2 97%   BMI 29.63 kg/m²     Physical Exam  On exam her incisions healing well. Steri-Strips were removed. No signs of infection. Good healing ridge. No evidence of recurrence. No skin changes. Assessment/Plan:          Diagnosis Orders   1. Non-recurrent unilateral inguinal hernia without obstruction or gangrene        Analgesia was discussed. Activity was discussed. She will return back to see me in the office as needed. Please note this report has beenpartially produced using speech recognition software and may cause contain errors related to that system including grammar, punctuation and spelling as well as words and phrases that may seem inappropriate.  If there arequestions or concerns please feel free to contact me to clarify

## 2019-05-15 RX ORDER — CIPROFLOXACIN 500 MG/1
500 TABLET, FILM COATED ORAL 2 TIMES DAILY
Qty: 14 TABLET | Refills: 0 | Status: SHIPPED | OUTPATIENT
Start: 2019-05-15 | End: 2019-05-22

## 2020-03-18 ENCOUNTER — HOSPITAL ENCOUNTER (OUTPATIENT)
Age: 45
Setting detail: SPECIMEN
Discharge: HOME | End: 2020-03-18

## 2020-03-18 PROCEDURE — 87077 CULTURE AEROBIC IDENTIFY: CPT

## 2020-03-18 PROCEDURE — 87086 URINE CULTURE/COLONY COUNT: CPT

## 2020-03-18 PROCEDURE — 87186 SC STD MICRODIL/AGAR DIL: CPT

## 2020-03-18 RX ORDER — AMOXICILLIN AND CLAVULANATE POTASSIUM 875; 125 MG/1; MG/1
1 TABLET, FILM COATED ORAL 3 TIMES DAILY
Qty: 30 TABLET | Refills: 0 | Status: SHIPPED | OUTPATIENT
Start: 2020-03-18 | End: 2020-03-28

## 2020-03-18 RX ORDER — FLUCONAZOLE 150 MG/1
TABLET ORAL
Qty: 2 TABLET | Refills: 0 | Status: SHIPPED | OUTPATIENT
Start: 2020-03-18

## 2020-03-21 LAB
ORGANISM: ABNORMAL
URINE CULTURE, ROUTINE: ABNORMAL

## 2020-03-25 PROBLEM — M77.11 LATERAL EPICONDYLITIS OF RIGHT ELBOW: Status: RESOLVED | Noted: 2018-01-24 | Resolved: 2020-03-24

## 2022-02-24 ENCOUNTER — HOSPITAL ENCOUNTER (EMERGENCY)
Age: 47
Discharge: HOME | End: 2022-02-25
Attending: EMERGENCY MEDICINE
Payer: COMMERCIAL

## 2022-02-24 ENCOUNTER — APPOINTMENT (OUTPATIENT)
Dept: GENERAL RADIOLOGY | Age: 47
End: 2022-02-24
Payer: COMMERCIAL

## 2022-02-24 VITALS
BODY MASS INDEX: 29.23 KG/M2 | RESPIRATION RATE: 18 BRPM | DIASTOLIC BLOOD PRESSURE: 86 MMHG | SYSTOLIC BLOOD PRESSURE: 132 MMHG | WEIGHT: 165 LBS | HEART RATE: 104 BPM | OXYGEN SATURATION: 96 % | TEMPERATURE: 98.6 F | HEIGHT: 63 IN

## 2022-02-24 DIAGNOSIS — S81.812A LACERATION OF LEFT LOWER EXTREMITY, INITIAL ENCOUNTER: Primary | ICD-10-CM

## 2022-02-24 PROCEDURE — 12001 RPR S/N/AX/GEN/TRNK 2.5CM/<: CPT

## 2022-02-24 PROCEDURE — 96372 THER/PROPH/DIAG INJ SC/IM: CPT

## 2022-02-24 PROCEDURE — 6360000002 HC RX W HCPCS: Performed by: EMERGENCY MEDICINE

## 2022-02-24 PROCEDURE — 73650 X-RAY EXAM OF HEEL: CPT

## 2022-02-24 PROCEDURE — 29515 APPLICATION SHORT LEG SPLINT: CPT

## 2022-02-24 PROCEDURE — 99283 EMERGENCY DEPT VISIT LOW MDM: CPT

## 2022-02-24 PROCEDURE — 6370000000 HC RX 637 (ALT 250 FOR IP): Performed by: EMERGENCY MEDICINE

## 2022-02-24 RX ORDER — KETOROLAC TROMETHAMINE 30 MG/ML
30 INJECTION, SOLUTION INTRAMUSCULAR; INTRAVENOUS ONCE
Status: COMPLETED | OUTPATIENT
Start: 2022-02-24 | End: 2022-02-24

## 2022-02-24 RX ORDER — HYDROCODONE BITARTRATE AND ACETAMINOPHEN 5; 325 MG/1; MG/1
1 TABLET ORAL
Qty: 12 TABLET | Refills: 0 | Status: SHIPPED | OUTPATIENT
Start: 2022-02-24 | End: 2022-02-27

## 2022-02-24 RX ORDER — DIAPER,BRIEF,INFANT-TODD,DISP
EACH MISCELLANEOUS ONCE
Status: COMPLETED | OUTPATIENT
Start: 2022-02-24 | End: 2022-02-24

## 2022-02-24 RX ORDER — HYDROCODONE BITARTRATE AND ACETAMINOPHEN 5; 325 MG/1; MG/1
1 TABLET ORAL ONCE
Status: COMPLETED | OUTPATIENT
Start: 2022-02-24 | End: 2022-02-24

## 2022-02-24 RX ORDER — MORPHINE SULFATE 4 MG/ML
4 INJECTION, SOLUTION INTRAMUSCULAR; INTRAVENOUS ONCE
Status: COMPLETED | OUTPATIENT
Start: 2022-02-24 | End: 2022-02-24

## 2022-02-24 RX ADMIN — HYDROCODONE BITARTRATE AND ACETAMINOPHEN 1 TABLET: 5; 325 TABLET ORAL at 23:43

## 2022-02-24 RX ADMIN — MORPHINE SULFATE 4 MG: 4 INJECTION, SOLUTION INTRAMUSCULAR; INTRAVENOUS at 21:53

## 2022-02-24 RX ADMIN — BACITRACIN: 500 OINTMENT TOPICAL at 23:43

## 2022-02-24 RX ADMIN — KETOROLAC TROMETHAMINE 30 MG: 30 INJECTION, SOLUTION INTRAMUSCULAR; INTRAVENOUS at 21:53

## 2022-02-24 ASSESSMENT — PAIN SCALES - GENERAL
PAINLEVEL_OUTOF10: 6
PAINLEVEL_OUTOF10: 10
PAINLEVEL_OUTOF10: 10

## 2022-02-24 ASSESSMENT — PAIN DESCRIPTION - ORIENTATION: ORIENTATION: LEFT

## 2022-02-24 ASSESSMENT — PAIN - FUNCTIONAL ASSESSMENT: PAIN_FUNCTIONAL_ASSESSMENT: 0-10

## 2022-02-24 ASSESSMENT — PAIN DESCRIPTION - LOCATION: LOCATION: FOOT

## 2022-02-25 PROCEDURE — 29515 APPLICATION SHORT LEG SPLINT: CPT

## 2022-02-25 NOTE — ED PROVIDER NOTES
[facility] ED  EMERGENCY DEPARTMENT ENCOUNTER      Pt Name: Leonarda Burgos  MRN: [id number]  [place] 1975  Date of evaluation: 2/24/2022  Provider: Mari Macario DO    CHIEF COMPLAINT       Chief Complaint   Patient presents with    Laceration     laceration to left heel. wooden sign fell on it     Chief complaint: Left heel injury  History of chief complaint: This 39-year-old female presents the emergency department complaining of left heel pain. Patient states they have a large 2 x 10 board that is a welcome sign inside their house states when she came in it fell over and came down and the top of it hit right on the back of her left heel. Patient states it did cut her heel states they did do peroxide and iodine cleansing at home. Patient complaining of ongoing pain in the left heel worse with movement and weightbearing. Patient denies any numb tingling or weakness to the foot. Patient denies other injury or trauma. Patient relates tetanus shot is up-to-date. Patient has had previous hysterectomy    Nursing Notes were reviewed. REVIEW OF SYSTEMS    (2-9 systems for level 4, 10 or more for level 5)     Review of Systems  Pertinent findings documented in the history of present illness  Except as noted above the remainder of the review of systems was reviewed and negative.        PAST MEDICAL HISTORY     Past Medical History:   Diagnosis Date    Contusion of left shoulder 6/16/2016    [facility] 11/7/2014    Dysmenorrhea 6/29/2015    Fatigue 2/6/2015    Gestational diabetes, x 2 of 5 pregnancies 11/7/2014    Lateral epicondylitis of right elbow 1/24/2018    Lateral epicondylitis of right elbow 5/16/2018    S/P vaginal hysterectomy, 10/15 4/7/2016    Uterine fibroids, Dr Kiel Hand 6/29/2015         SURGICAL HISTORY       Past Surgical History:   Procedure Laterality Date    BLADDER SUSPENSION      CHOLECYSTECTOMY      COLONOSCOPY      HEMORRHOID SURGERY  2001    HERNIA REPAIR Right 4/17/2019    RIGHT INGUINAL HERNIA REPAIR WITH MESH (PAT ON ADMIT) performed by Delmy Roberts MD at [facility]       Previous Medications    DULAGLUTIDE (TRULICITY) 0.60 UG/8.9IN SOPN    Inject 0.75 mg into the skin every 7 days    EMPAGLIFLOZIN (JARDIANCE) 25 MG TABLET    TAKE 1 TABLET BY MOUTH DAILY    FLUCONAZOLE (DIFLUCAN) 150 MG TABLET    Take 1 tablet by mouth. Repeat 3 days later.     IBUPROFEN (ADVIL;MOTRIN) 800 MG TABLET        INSULIN PEN NEEDLE (BD PEN NEEDLE SIDNEY U/F) 32G X 4 MM MISC    USE AS DIRECTED WITH VICTOZA    LIRAGLUTIDE (VICTOZA SC)    Inject 1.2 Units/day into the skin daily    MULTIPLE VITAMINS-CALCIUM (ONE-A-DAY WOMENS PO)    Take 1 tablet by mouth       ALLERGIES     Invokana [canagliflozin] and Metformin and related    FAMILY HISTORY       Family History   Problem Relation Age of Onset    Asthma Mother     Depression Mother     Cancer Mother     Diabetes Maternal Grandmother     Depression Maternal Grandmother     Diabetes Paternal Grandmother     Diabetes Paternal Grandfather     Diabetes Paternal Aunt     Diabetes Paternal Uncle     High Blood Pressure Maternal Grandfather     Heart Disease Maternal Grandfather           SOCIAL HISTORY       Social History     Socioeconomic History    Marital status:      Spouse name: None    Number of children: None    Years of education: None    Highest education level: None   Occupational History    None   Tobacco Use    Smoking status: Former Smoker    Smokeless tobacco: Never Used   Substance and Sexual Activity    Alcohol use: No    Drug use: No    Sexual activity: Yes     Partners: Male   Other Topics Concern    None   Social History Narrative    None     Social Determinants of Health     Financial Resource Strain:     Difficulty of Paying Living Expenses: Not on file   Food Insecurity:     Worried About Running Out of Food in the Last Year: Not on file    [address] in the Last Year: Not on file   Transportation Needs:     Lack of Transportation (Medical): Not on file    Lack of Transportation (Non-Medical): Not on file   Physical Activity:     Days of Exercise per Week: Not on file    Minutes of Exercise per Session: Not on file   Stress:     Feeling of Stress : Not on file   Social Connections:     Frequency of Communication with Friends and Family: Not on file    Frequency of Social Gatherings with Friends and Family: Not on file    Attends L[employer] day Services: Not on file    Active Member of [address] [place] [address] [employer] or Organizations: Not on file    Attends Club or Organization Meetings: Not on file    Marital Status: Not on file   Intimate Partner Violence:     Fear of Current or Ex-Partner: Not on file    Emotionally Abused: Not on file    Physically Abused: Not on file    Sexually Abused: Not on file   Housing Stability:     Unable to Pay for Housing in the Last Year: Not on file    Number of [place] in the Last Year: Not on file    Unstable Housing in the Last Year: Not on file           PHYSICAL EXAM    (up to 7 for level 4, 8 or more for level 5)     ED Triage Vitals [02/24/22 2119]   BP Temp Temp Source Pulse Resp SpO2 Height Weight   132/86 98.6 °F (37 °C) Temporal 104 18 96 % 5' 3\" (1.6 m) 165 lb (74.8 kg)       Physical Exam   General appearance: Patient is awake alert interactive appropriate nontoxic in no distress but obvious discomfort  Heart is regular rate and rhythm  Lungs are clear to auscultation with equal breast bilaterally  Left heel: There is a 2 cm round area of flap laceration overlying the top posterior aspect of the heel in the area of the insertion of the Achilles. There is no active bleeding, no gross foreign body appreciated. The Achilles tendon is grossly intact with flexion extension at the ankle intact there is focal point bony tenderness to the posterior heel.   There is no pain on palpation through the mid or forefoot or sensory pulses are intact distally capillary refill less than 2 seconds. DIAGNOSTIC RESULTS     RADIOLOGY:   Non-plain film images such as CT, Ultrasound and MRI are read by the radiologist. Plain radiographic images are visualized and preliminarily interpreted by the emergency physician with the below findings:    X-ray of the heel multiple views interpreted by ED physician indication trauma: There are no acute bony fractures appreciated normal joint alignment. Official radiology report is pending    Interpretation per the Radiologist below, if available at the time of this note:          [address] [place] [address] and DIFFERENTIAL DIAGNOSIS/MDM:   Vitals:    Vitals:    02/24/22 2119   BP: 132/86   Pulse: 104   Resp: 18   Temp: 98.6 °F (37 °C)   TempSrc: Temporal   SpO2: 96%   Weight: 165 lb (74.8 kg)   Height: 5' 3\" (1.6 m)     Treatment and course: Patient was given morphine 4 mg IM Toradol 30 mg IM initiated here. Laceration repair was performed by ED physician: The wound was cleansed with antibacterial solution, sterile technique was maintained throughout the procedure wound was injected with 1% lidocaine, wound was irrigated with copious normal saline and explored, wound base appears superficial to the Achilles tendon, no foreign body appreciated. Wound was closed with 5 sutures of 5-0 Ethilon with good approximation of the wound margins. Patient was placed in a custom posterior OCL splint for comfort and to limit full flexion of the foot for wound healing. Vicodin 1 p.o. was initiated here. FINAL IMPRESSION      1. Laceration of left lower extremity, initial encounter          DISPOSITION/PLAN   DISPOSITION    Discharged home advised to rest ice elevate obtain the splint for comfort, remove for wound recheck in 2 days. Return if any change or worsening increased pain numb tingling weakness or discoloration to the extremity.   Patient to follow-up with primary physician for repeat assessment in the next 2 to 3 days    PATIENT REFERRED TO:  Dinora Stephenson DO  [facility] [phone]    In 2 days        DISCHARGE MEDICATIONS:  New Prescriptions    HYDROCODONE-ACETAMINOPHEN (NORCO) 5-325 MG PER TABLET    Take 1 tablet by mouth every 4-6 hours as needed for Pain for up to 3 days. Intended supply: 3 days. Take lowest dose possible to manage pain     Controlled Substances Monitoring:     No flowsheet data found.     (Please note that portions of this note were completed with a voice recognition program.  Efforts were made to edit the dictations but occasionally words are mis-transcribed.)    Debbie Howell DO (electronically signed)  Attending Emergency Physician            Debbie Howell DO  02/24/22 9940

## 2022-05-01 ENCOUNTER — HOSPITAL ENCOUNTER (EMERGENCY)
Age: 47
Discharge: HOME | End: 2022-05-01
Attending: EMERGENCY MEDICINE
Payer: COMMERCIAL

## 2022-05-01 VITALS
SYSTOLIC BLOOD PRESSURE: 122 MMHG | OXYGEN SATURATION: 100 % | TEMPERATURE: 98.6 F | HEIGHT: 62 IN | BODY MASS INDEX: 29.44 KG/M2 | DIASTOLIC BLOOD PRESSURE: 82 MMHG | WEIGHT: 160 LBS | RESPIRATION RATE: 19 BRPM | HEART RATE: 80 BPM

## 2022-05-01 DIAGNOSIS — M54.50 ACUTE MIDLINE LOW BACK PAIN WITHOUT SCIATICA: Primary | ICD-10-CM

## 2022-05-01 LAB
BILIRUBIN URINE: NEGATIVE
BLOOD, URINE: NEGATIVE
CLARITY: CLEAR
COLOR: YELLOW
GLUCOSE URINE: 500 MG/DL
KETONES, URINE: NEGATIVE MG/DL
LEUKOCYTE ESTERASE, URINE: NEGATIVE
NITRITE, URINE: NEGATIVE
PH UA: 5 (ref 5–9)
PROTEIN UA: NEGATIVE MG/DL
SPECIFIC GRAVITY UA: 1.01 (ref 1–1.03)
URINE REFLEX TO CULTURE: ABNORMAL
UROBILINOGEN, URINE: 0.2 E.U./DL

## 2022-05-01 PROCEDURE — 6370000000 HC RX 637 (ALT 250 FOR IP): Performed by: EMERGENCY MEDICINE

## 2022-05-01 PROCEDURE — 99284 EMERGENCY DEPT VISIT MOD MDM: CPT

## 2022-05-01 PROCEDURE — 6360000002 HC RX W HCPCS: Performed by: EMERGENCY MEDICINE

## 2022-05-01 PROCEDURE — 96372 THER/PROPH/DIAG INJ SC/IM: CPT

## 2022-05-01 PROCEDURE — 81003 URINALYSIS AUTO W/O SCOPE: CPT

## 2022-05-01 RX ORDER — LIDOCAINE 50 MG/G
1 PATCH TOPICAL EVERY 24 HOURS
Qty: 30 PATCH | Refills: 0 | Status: SHIPPED | OUTPATIENT
Start: 2022-05-01 | End: 2022-05-31

## 2022-05-01 RX ORDER — METHOCARBAMOL 500 MG/1
500 TABLET, FILM COATED ORAL 4 TIMES DAILY PRN
Qty: 20 TABLET | Refills: 0 | Status: SHIPPED | OUTPATIENT
Start: 2022-05-01 | End: 2022-05-06

## 2022-05-01 RX ORDER — KETOROLAC TROMETHAMINE 30 MG/ML
60 INJECTION, SOLUTION INTRAMUSCULAR; INTRAVENOUS ONCE
Status: COMPLETED | OUTPATIENT
Start: 2022-05-01 | End: 2022-05-01

## 2022-05-01 RX ORDER — NAPROXEN 500 MG/1
500 TABLET ORAL 2 TIMES DAILY PRN
Qty: 20 TABLET | Refills: 0 | Status: SHIPPED | OUTPATIENT
Start: 2022-05-01 | End: 2022-05-11

## 2022-05-01 RX ORDER — ORPHENADRINE CITRATE 30 MG/ML
60 INJECTION INTRAMUSCULAR; INTRAVENOUS ONCE
Status: COMPLETED | OUTPATIENT
Start: 2022-05-01 | End: 2022-05-01

## 2022-05-01 RX ORDER — LIDOCAINE 4 G/G
1 PATCH TOPICAL ONCE
Status: DISCONTINUED | OUTPATIENT
Start: 2022-05-01 | End: 2022-05-01 | Stop reason: HOSPADM

## 2022-05-01 RX ADMIN — KETOROLAC TROMETHAMINE 60 MG: 30 INJECTION, SOLUTION INTRAMUSCULAR at 15:05

## 2022-05-01 RX ADMIN — ORPHENADRINE CITRATE 60 MG: 30 INJECTION INTRAMUSCULAR; INTRAVENOUS at 15:05

## 2022-05-01 ASSESSMENT — PAIN DESCRIPTION - LOCATION
LOCATION: BACK
LOCATION: BACK

## 2022-05-01 ASSESSMENT — PAIN DESCRIPTION - PAIN TYPE
TYPE: ACUTE PAIN
TYPE: ACUTE PAIN

## 2022-05-01 ASSESSMENT — PAIN DESCRIPTION - FREQUENCY: FREQUENCY: CONTINUOUS

## 2022-05-01 ASSESSMENT — PAIN SCALES - GENERAL
PAINLEVEL_OUTOF10: 10
PAINLEVEL_OUTOF10: 6
PAINLEVEL_OUTOF10: 10

## 2022-05-01 ASSESSMENT — PAIN DESCRIPTION - ONSET: ONSET: ON-GOING

## 2022-05-01 ASSESSMENT — PAIN DESCRIPTION - DESCRIPTORS
DESCRIPTORS: SHARP
DESCRIPTORS: SHARP

## 2022-05-01 ASSESSMENT — PAIN DESCRIPTION - ORIENTATION: ORIENTATION: LOWER;RIGHT

## 2022-05-01 ASSESSMENT — PAIN - FUNCTIONAL ASSESSMENT: PAIN_FUNCTIONAL_ASSESSMENT: 0-10

## 2022-05-01 NOTE — ED PROVIDER NOTES
eMERGENCY dEPARTMENT eNCOUnter      [facility]    Chief Complaint   Patient presents with    Back Pain       HPI    Selena Mendoza is a 48 y.o. female with hx of DM, S/p Hysterectomy who presentsto ED from home   By private car   With complaint of low back pain   Onset since yesterday   Intensity of symptoms moderate   Location of symptoms lower back on the right with no radiation   She deinies fever chills , abdominal Pain , UTI symptoms , injury  She denies bowel bladder incontinence , weakness or numbness in lower extremity   She is able to ambulate but c/o pain with ambulation        PAST MEDICAL HISTORY    Past Medical History:   Diagnosis Date    Contusion of left shoulder 6/16/2016    DM (diabetes mellitus) ([address] 11/7/2014    Dysmenorrhea 6/29/2015    Fatigue 2/6/2015    Gestational diabetes, x 2 of 5 pregnancies 11/7/2014    Lateral epicondylitis of right elbow 1/24/2018    Lateral epicondylitis of right elbow 5/16/2018    S/P vaginal hysterectomy, 10/15 4/7/2016    Uterine fibroids, Dr Andarde Shallow 6/29/2015       SURGICAL HISTORY    Past Surgical History:   Procedure Laterality Date    BLADDER SUSPENSION      CHOLECYSTECTOMY      COLONOSCOPY      HEMORRHOID SURGERY  2001    HERNIA REPAIR Right 4/17/2019    RIGHT INGUINAL HERNIA REPAIR WITH MESH (PAT ON ADMIT) performed by Nancy Clark MD at [address]    Current Outpatient Rx   Medication Sig Dispense Refill    lidocaine (LIDODERM) 5 % Place 1 patch onto the skin every 24 hours Place 1 patch onto the skin daily 12 hours on, 12 hours off.  30 patch 0    methocarbamol (ROBAXIN) 500 MG tablet Take 1 tablet by mouth 4 times daily as needed (Muscle spasms) 20 tablet 0    naproxen (NAPROSYN) 500 MG tablet Take 1 tablet by mouth 2 times daily as needed for Pain (with meals) 20 tablet 0    Liraglutide (VICTOZA SC) Inject 1.2 Units/day into the skin daily      fluconazole (DIFLUCAN) 150 MG tablet Take 1 tablet by mouth. Repeat 3 days later. 2 tablet 0    Multiple Vitamins-Calcium (ONE-A-DAY WOMENS PO) Take 1 tablet by mouth      ibuprofen (ADVIL;MOTRIN) 800 MG tablet       Dulaglutide (TRULICITY) 1.00 WI/3.1BJ SOPN Inject 0.75 mg into the skin every 7 days 4 pen 5    empagliflozin (JARDIANCE) 25 MG tablet TAKE 1 TABLET BY MOUTH DAILY 30 tablet 3    Insulin Pen Needle (BD PEN NEEDLE SIDNEY U/F) 32G X 4 MM MISC USE AS DIRECTED WITH VICTOZA 100 each 0       ALLERGIES    Allergies   Allergen Reactions    Invokana [Canagliflozin] Other (See Comments)     UTI, yeast infections    Metformin And Related Other (See Comments)     Stomach cramps       FAMILY HISTORY    Family History   Problem Relation Age of Onset    Asthma Mother     Depression Mother     Cancer Mother     Diabetes Maternal Grandmother     Depression Maternal Grandmother     Diabetes Paternal Grandmother     Diabetes Paternal Grandfather     Diabetes Paternal Aunt     Diabetes Paternal Uncle     High Blood Pressure Maternal Grandfather     Heart Disease Maternal Grandfather        SOCIAL HISTORY    Social History     Socioeconomic History    Marital status:      Spouse name: Not on file    Number of children: Not on file    Years of education: Not on file    Highest education level: Not on file   Occupational History    Not on file   Tobacco Use    Smoking status: Former Smoker    Smokeless tobacco: Never Used   Substance and Sexual Activity    Alcohol use: No    Drug use: No    Sexual activity: Yes     Partners: Male   Other Topics Concern    Not on file   Social History Narrative    Not on file     Social Determinants of Health     Financial Resource Strain:     Difficulty of Paying Living Expenses: Not on file   Food Insecurity:     Worried About Running Out of Food in the Last Year: Not on file    David of Food in the Last Year: Not on file   Transportation Needs:     Lack of Transportation (Medical):  Not on file    Lack of Transportation (Non-Medical): Not on file   Physical Activity:     Days of Exercise per Week: Not on file    Minutes of Exercise per Session: Not on file   Stress:     Feeling of Stress : Not on file   Social Connections:     Frequency of Communication with Friends and Family: Not on file    Frequency of Social Gatherings with Friends and Family: Not on file    Attends [employer] Services: Not on file    Active Member of [address] [place] [address] or Organizations: Not on file    Attends Club or Organization Meetings: Not on file    Marital Status: Not on file   Intimate Partner Violence:     Fear of Current or Ex-Partner: Not on file    Emotionally Abused: Not on file    Physically Abused: Not on file    Sexually Abused: Not on file   Housing Stability:     Unable to Pay for Housing in the Last Year: Not on file    Number of [place] in the Last Year: Not on file    Unstable Housing in the Last Year: Not on file       REVIEW OF SYSTEMS    Constitutional:  Denies fever, chills, weight loss or weakness   Eyes:  Denies photophobia or discharge   HENT:  Denies sore throat or ear pain   Respiratory:  Denies cough or shortness of breath   Cardiovascular:  Denies chest pain, palpitations or swelling   GI:  Denies abdominal pain, nausea, vomiting, or diarrhea   Musculoskeletal:  C/o low  back pain   Skin:  Denies rash   Neurologic:  Denies headache, focal weakness or sensory changes   Endocrine:  Denies polyuria or polydypsia   Lymphatic:  Denies swollen glands   Psychiatric:  Denies depression, suicidal ideation or homicidal ideation   All systems negative except as marked. PHYSICAL EXAM    VITAL SIGNS: /84   Pulse 82   Temp 98.6 °F (37 °C) (Oral)   Resp 18   Ht 5' 2\" (1.575 m)   Wt 160 lb (72.6 kg)   LMP 06/25/2015   SpO2 99%   BMI 29.26 kg/m²    Constitutional:  Well developed, Well nourished, No acute distress, Non-toxic appearance.    HENT:  Normocephalic, Atraumatic, Bilateral external ears normal, Oropharynx moist, No oral exudates, Nose normal. Neck- Normal range of motion, No tenderness, Supple, No stridor. Eyes:  PERRL, EOMI, Conjunctiva normal, No discharge. Respiratory:  Normal breath sounds, No respiratory distress, No wheezing, No chest tenderness. Cardiovascular:  Normal heart rate, Normal rhythm, No murmurs, No rubs, No gallops. GI:  Bowel sounds normal, Soft, No tenderness, No masses, No pulsatile masses. :No CVA tenderness. Musculoskeletal:  Intact distal pulses, No edema, No tenderness, No cyanosis, No clubbing. Good range of motion in all major joints. No tenderness to palpation or major deformities noted. Back- lower lumabr tenderness. SLR negative, power and reflexes in lower extremity normal    Integument:  Warm, Dry, No erythema, No rash. Lymphatic:  No lymphadenopathy noted. Neurologic:  Alert & oriented x 3, Normal motor function, Normal sensory function, No focal deficits noted. Psychiatric:  Affect normal, Judgment normal, Mood normal.     REEVALUATION   Patient was updated the results of labs . Pain control adequate   Ambulating       Labs  Labs Reviewed   URINALYSIS WITH REFLEX TO CULTURE - Abnormal; Notable for the following components:       Result Value    Glucose, Ur 500 (*)     All other components within normal limits             Summation      Patient Course:     ED Medications administered this visit:    Medications   lidocaine 4 % external patch 1 patch (1 patch TransDERmal Patch Applied 5/1/22 1507)   ketorolac (TORADOL) injection 60 mg (60 mg IntraMUSCular Given 5/1/22 1505)   orphenadrine (NORFLEX) injection 60 mg (60 mg IntraMUSCular Given 5/1/22 1505)       New Prescriptions from this visit:    New Prescriptions    LIDOCAINE (LIDODERM) 5 %    Place 1 patch onto the skin every 24 hours Place 1 patch onto the skin daily 12 hours on, 12 hours off.     METHOCARBAMOL (ROBAXIN) 500 MG TABLET    Take 1 tablet by mouth 4 times daily as needed (Muscle spasms)    NAPROXEN (NAPROSYN) 500 MG TABLET    Take 1 tablet by mouth 2 times daily as needed for Pain (with meals)       Follow-up:  Radha Alamo [address] Dr [place]rellana in 1 day          Final Impression:   1.  Acute midline low back pain without sciatica               (Please note that portions of this note were completed with a voice recognition program.  Efforts were made to edit the dictations but occasionally words are mis-transcribed.)          Laron Gibbons MD  05/01/22 1440

## 2022-05-01 NOTE — ED TRIAGE NOTES
A&Ox4 Pt presents with lower right sided back pain that Pt states began yesterday. Pt denies injury. Pain is non-radiating. No other complaints at this time. MSPs x4 intact. Pt  at bedside.

## 2023-02-01 ENCOUNTER — HOSPITAL ENCOUNTER (EMERGENCY)
Age: 48
Discharge: HOME | End: 2023-02-01
Attending: EMERGENCY MEDICINE
Payer: COMMERCIAL

## 2023-02-01 VITALS
RESPIRATION RATE: 18 BRPM | SYSTOLIC BLOOD PRESSURE: 118 MMHG | HEART RATE: 104 BPM | TEMPERATURE: 98.5 F | DIASTOLIC BLOOD PRESSURE: 74 MMHG | BODY MASS INDEX: 28.35 KG/M2 | OXYGEN SATURATION: 94 % | WEIGHT: 155 LBS

## 2023-02-01 DIAGNOSIS — F32.A DEPRESSION, UNSPECIFIED DEPRESSION TYPE: Primary | ICD-10-CM

## 2023-02-01 DIAGNOSIS — U07.1 COVID-19: ICD-10-CM

## 2023-02-01 LAB
INFLUENZA A BY PCR: NEGATIVE
INFLUENZA B BY PCR: NEGATIVE
SARS-COV-2, NAAT: DETECTED
STREP GRP A PCR: NEGATIVE

## 2023-02-01 PROCEDURE — 96374 THER/PROPH/DIAG INJ IV PUSH: CPT

## 2023-02-01 PROCEDURE — 99284 EMERGENCY DEPT VISIT MOD MDM: CPT

## 2023-02-01 PROCEDURE — 87502 INFLUENZA DNA AMP PROBE: CPT

## 2023-02-01 PROCEDURE — 6360000002 HC RX W HCPCS: Performed by: EMERGENCY MEDICINE

## 2023-02-01 PROCEDURE — 2580000003 HC RX 258: Performed by: EMERGENCY MEDICINE

## 2023-02-01 PROCEDURE — 87635 SARS-COV-2 COVID-19 AMP PRB: CPT

## 2023-02-01 PROCEDURE — 87651 STREP A DNA AMP PROBE: CPT

## 2023-02-01 RX ORDER — 0.9 % SODIUM CHLORIDE 0.9 %
1000 INTRAVENOUS SOLUTION INTRAVENOUS ONCE
Status: COMPLETED | OUTPATIENT
Start: 2023-02-01 | End: 2023-02-01

## 2023-02-01 RX ORDER — KETOROLAC TROMETHAMINE 30 MG/ML
30 INJECTION, SOLUTION INTRAMUSCULAR; INTRAVENOUS ONCE
Status: COMPLETED | OUTPATIENT
Start: 2023-02-01 | End: 2023-02-01

## 2023-02-01 RX ADMIN — KETOROLAC TROMETHAMINE 30 MG: 30 INJECTION, SOLUTION INTRAMUSCULAR at 22:23

## 2023-02-01 RX ADMIN — SODIUM CHLORIDE 1000 ML: 9 INJECTION, SOLUTION INTRAVENOUS at 22:05

## 2023-02-01 ASSESSMENT — ENCOUNTER SYMPTOMS
NAUSEA: 0
COUGH: 0
EYE REDNESS: 0
BACK PAIN: 0
ABDOMINAL PAIN: 0
VOMITING: 0
SHORTNESS OF BREATH: 0
RHINORRHEA: 1
EYE DISCHARGE: 0
SORE THROAT: 1

## 2023-02-01 ASSESSMENT — PAIN SCALES - GENERAL: PAINLEVEL_OUTOF10: 6

## 2023-02-01 ASSESSMENT — PAIN - FUNCTIONAL ASSESSMENT: PAIN_FUNCTIONAL_ASSESSMENT: 0-10

## 2023-02-02 NOTE — ED NOTES
IV attempted x2 unsuccessful. Pt tolerated fairly well. Florence Clements RN will attempt IV.       Claude Idler, HELLEN  02/01/23 9066

## 2023-02-02 NOTE — ED PROVIDER NOTES
[facility] ED  EMERGENCY DEPARTMENT ENCOUNTER      Pt Name: Felisa Hensley  MRN: [id number]  [place] 1975  Date of evaluation: 2/1/2023  Provider: Jaquan Corey DO        HISTORY OF PRESENT ILLNESS    Selena Mendoza is a 50 y.o. female per chart review has ah/o fatigue, DM, uterine fibroids, lateral epicondyllitis. She presents with flu like symptoms. The history is provided by the patient. URI  Presenting symptoms: congestion, fatigue, fever, rhinorrhea and sore throat    Presenting symptoms: no cough and no ear pain    Severity:  Mild  Onset quality:  Sudden  Timing:  Constant  Progression:  Unchanged  Chronicity:  New  Relieved by:  Nothing  Worsened by:  Nothing  Ineffective treatments:  None tried  Associated symptoms: myalgias    Associated symptoms: no neck pain    Risk factors: recent illness and sick contacts    Risk factors: no chronic cardiac disease, no chronic kidney disease and no chronic respiratory disease           REVIEW OF SYSTEMS       Review of Systems   Constitutional:  Positive for fatigue and fever. Negative for chills. HENT:  Positive for congestion, rhinorrhea and sore throat. Negative for ear pain. Eyes:  Negative for discharge and redness. Respiratory:  Negative for cough and shortness of breath. Cardiovascular:  Negative for chest pain and palpitations. Gastrointestinal:  Negative for abdominal pain, nausea and vomiting. Genitourinary:  Negative for difficulty urinating and dysuria. Musculoskeletal:  Positive for myalgias. Negative for back pain and neck pain. Skin:  Negative for rash and wound. Neurological:  Negative for dizziness and syncope. Psychiatric/Behavioral:  Negative for confusion. The patient is not nervous/anxious. All other systems reviewed and are negative. Except as noted above the remainder of the review of systems was reviewed and negative.        PAST MEDICAL HISTORY     Past Medical History:   Diagnosis Date    Contusion of left shoulder 6/16/2016    DM (diabetes mellitus) ([facility] [address] 11/7/2014    Dysmenorrhea 6/29/2015    Fatigue 2/6/2015    Gestational diabetes, x 2 of 5 pregnancies 11/7/2014    Lateral epicondylitis of right elbow 1/24/2018    Lateral epicondylitis of right elbow 5/16/2018    S/P vaginal hysterectomy, 10/15 4/7/2016    Uterine fibroids, Dr Lenny Alberto 6/29/2015         SURGICAL HISTORY       Past Surgical History:   Procedure Laterality Date    BLADDER SUSPENSION      CHOLECYSTECTOMY      COLONOSCOPY      HEMORRHOID SURGERY  2001    HERNIA REPAIR Right 4/17/2019    RIGHT INGUINAL HERNIA REPAIR WITH MESH (PAT ON ADMIT) performed by Clovis Hernandez MD at [facility]       Discharge Medication List as of 2/1/2023 10:57 PM        CONTINUE these medications which have NOT CHANGED    Details   naproxen (NAPROSYN) 500 MG tablet Take 1 tablet by mouth 2 times daily as needed for Pain (with meals), Disp-20 tablet, R-0Print      Liraglutide (VICTOZA SC) Inject 1.2 Units/day into the skin dailyHistorical Med      Multiple Vitamins-Calcium (ONE-A-DAY WOMENS PO) Take 1 tablet by mouthHistorical Med      ibuprofen (ADVIL;MOTRIN) 800 MG tablet Historical Med      Dulaglutide (TRULICITY) 2.08 ZW/1.0HS SOPN Inject 0.75 mg into the skin every 7 days, Disp-4 pen, R-5Normal      Insulin Pen Needle (BD PEN NEEDLE SIDNEY U/F) 32G X 4 MM MISC Disp-100 each, R-0, NormalUSE AS DIRECTED WITH VICTOZA             ALLERGIES     Invokana [canagliflozin] and Metformin and related    FAMILY HISTORY       Family History   Problem Relation Age of Onset    Asthma Mother     Depression Mother     Cancer Mother     Diabetes Maternal Grandmother     Depression Maternal Grandmother     Diabetes Paternal Grandmother     Diabetes Paternal Grandfather     Diabetes Paternal Aunt     Diabetes Paternal Uncle     High Blood Pressure Maternal Grandfather     Heart Disease Maternal Grandfather           SOCIAL HISTORY       Social History     Socioeconomic History    Marital status:    Tobacco Use    Smoking status: Former    Smokeless tobacco: Never   Substance and Sexual Activity    Alcohol use: No    Drug use: No    Sexual activity: Yes     Partners: Male         PHYSICAL EXAM       ED Triage Vitals [02/01/23 2134]   BP Temp Temp src Heart Rate Resp SpO2 Height Weight   119/66 98.5 °F (36.9 °C) -- (!) 105 18 98 % -- 155 lb (70.3 kg)       Physical Exam  Vitals and nursing note reviewed. Constitutional:       Appearance: Normal appearance. HENT:      Head: Normocephalic and atraumatic. Right Ear: Tympanic membrane normal.      Left Ear: Tympanic membrane normal.      Nose: Congestion and rhinorrhea present. Mouth/Throat:      Mouth: Mucous membranes are moist.      Pharynx: Oropharynx is clear. Eyes:      General: Lids are normal.      Extraocular Movements: Extraocular movements intact. Conjunctiva/sclera: Conjunctivae normal.      Pupils: Pupils are equal, round, and reactive to light. Cardiovascular:      Rate and Rhythm: Regular rhythm. Tachycardia present. Pulses: Normal pulses. Heart sounds: Normal heart sounds. Pulmonary:      Effort: Pulmonary effort is normal.      Breath sounds: Normal breath sounds. Abdominal:      General: Abdomen is flat. Bowel sounds are normal.      Palpations: Abdomen is soft. Musculoskeletal:         General: Normal range of motion. Cervical back: Full passive range of motion without pain, normal range of motion and neck supple. Skin:     General: Skin is warm. Capillary Refill: Capillary refill takes less than 2 seconds. Neurological:      General: No focal deficit present. Mental Status: She is alert and oriented to person, place, and time. Deep Tendon Reflexes: Reflexes are normal and symmetric.    Psychiatric:         Attention and Perception: Attention and perception normal.         Mood and Affect: Mood normal.         Behavior: Behavior normal. Behavior is cooperative. LABS:  Labs Reviewed   COVID-19, RAPID - Abnormal; Notable for the following components:       Result Value    SARS-CoV-2, NAAT DETECTED (*)     All other components within normal limits   RAPID STREP SCREEN   RAPID INFLUENZA A/B ANTIGENS         MDM:   Vitals:    Vitals:    02/01/23 2134 02/01/23 2303   BP: 119/66 118/74   Pulse: (!) 105 (!) 104   Resp: 18 18   Temp: 98.5 °F (36.9 °C)    SpO2: 98% 94%   Weight: 155 lb (70.3 kg)        MDM  Number of Diagnoses or Management Options  COVID-19  Depression, unspecified depression type  Diagnosis management comments: Patient presents with flu like symptoms. Labs ordered, 1 liter IVF NS bolus ordered. The patient feels better and will be discharged home. She will follow up in 2 days with her primary care doctor. Amount and/or Complexity of Data Reviewed  Clinical lab tests: ordered and reviewed         No orders to display           SHAUNA RIVAS DO     The lab results, radiology and test results were reviewed with the patient and family. The patient will follow up in 2 days with their primary care doctor. If their symptoms change or get worse they will return to the ER. CRITICAL CARE TIME   Total CriticalCare time was 0 minutes, excluding separately reportable procedures. There was a high probability of clinically significant/life threatening deterioration in the patient's condition which required my urgent intervention. PROCEDURES:  Unlessotherwise noted below, none     Procedures      FINAL IMPRESSION      1. Depression, unspecified depression type    2.  COVID-19          DISPOSITION/PLAN   DISPOSITION Decision To Discharge 02/01/2023 10:13:36 PM          SHAUNA Castillo DO (electronically signed)  Attending Emergency Physician          Salvatore Gorman DO  02/02/23 0008

## 2023-02-02 NOTE — ED TRIAGE NOTES
Pt. Arrives with c/o 2 weeks of flu like symptoms, cough, fevers, general malaise, SOB, chills. Her granddaughter had bronchitis and her  has same symptoms.

## 2023-03-16 ENCOUNTER — CLINICAL SUPPORT (OUTPATIENT)
Dept: PRIMARY CARE | Facility: CLINIC | Age: 48
End: 2023-03-16
Payer: COMMERCIAL

## 2023-03-16 ENCOUNTER — TELEMEDICINE (OUTPATIENT)
Dept: PRIMARY CARE | Facility: CLINIC | Age: 48
End: 2023-03-16
Payer: COMMERCIAL

## 2023-03-16 DIAGNOSIS — R31.9 HEMATURIA, UNSPECIFIED TYPE: ICD-10-CM

## 2023-03-16 DIAGNOSIS — N30.01 ACUTE CYSTITIS WITH HEMATURIA: Primary | ICD-10-CM

## 2023-03-16 LAB
POC APPEARANCE, URINE: ABNORMAL
POC BILIRUBIN, URINE: NEGATIVE
POC BLOOD, URINE: ABNORMAL
POC COLOR, URINE: ABNORMAL
POC GLUCOSE, URINE: ABNORMAL MG/DL
POC KETONES, URINE: NEGATIVE MG/DL
POC LEUKOCYTES, URINE: ABNORMAL
POC NITRITE,URINE: NEGATIVE
POC PH, URINE: 7.5 PH
POC PROTEIN, URINE: ABNORMAL MG/DL
POC SPECIFIC GRAVITY, URINE: 1.02
POC UROBILINOGEN, URINE: 0.2 EU/DL

## 2023-03-16 PROCEDURE — 81002 URINALYSIS NONAUTO W/O SCOPE: CPT | Performed by: NURSE PRACTITIONER

## 2023-03-16 PROCEDURE — 87186 SC STD MICRODIL/AGAR DIL: CPT

## 2023-03-16 PROCEDURE — 87077 CULTURE AEROBIC IDENTIFY: CPT

## 2023-03-16 PROCEDURE — 99211 OFF/OP EST MAY X REQ PHY/QHP: CPT | Performed by: NURSE PRACTITIONER

## 2023-03-16 PROCEDURE — 99214 OFFICE O/P EST MOD 30 MIN: CPT | Performed by: NURSE PRACTITIONER

## 2023-03-16 PROCEDURE — 87086 URINE CULTURE/COLONY COUNT: CPT

## 2023-03-16 RX ORDER — IBUPROFEN 800 MG/1
800 TABLET ORAL 3 TIMES DAILY PRN
COMMUNITY
Start: 2021-09-20 | End: 2023-04-26

## 2023-03-16 RX ORDER — VALACYCLOVIR HYDROCHLORIDE 1 G/1
1000 TABLET, FILM COATED ORAL 2 TIMES DAILY PRN
COMMUNITY
End: 2024-01-19 | Stop reason: SDUPTHER

## 2023-03-16 RX ORDER — LIRAGLUTIDE 6 MG/ML
0.6 INJECTION SUBCUTANEOUS
COMMUNITY
End: 2023-06-02 | Stop reason: ALTCHOICE

## 2023-03-16 RX ORDER — DULAGLUTIDE 0.75 MG/.5ML
0.75 INJECTION, SOLUTION SUBCUTANEOUS
COMMUNITY
Start: 2023-01-08 | End: 2023-06-02 | Stop reason: ALTCHOICE

## 2023-03-16 RX ORDER — ESTERIFIED ESTROGEN AND METHYLTESTOSTERONE .625; 1.25 MG/1; MG/1
1 TABLET ORAL DAILY
COMMUNITY
End: 2023-05-24

## 2023-03-16 RX ORDER — CIPROFLOXACIN 250 MG/1
250 TABLET, FILM COATED ORAL 2 TIMES DAILY
Qty: 14 TABLET | Refills: 0 | Status: SHIPPED | OUTPATIENT
Start: 2023-03-16 | End: 2023-03-23

## 2023-03-16 RX ORDER — PEN NEEDLE, DIABETIC 31 GX5/16"
NEEDLE, DISPOSABLE MISCELLANEOUS
COMMUNITY
Start: 2022-10-24 | End: 2023-12-05 | Stop reason: SDUPTHER

## 2023-03-16 ASSESSMENT — ENCOUNTER SYMPTOMS
NAUSEA: 0
DYSURIA: 1
FLANK PAIN: 1
VOMITING: 0
FREQUENCY: 1
CHILLS: 0
HEMATURIA: 1
SWEATS: 0

## 2023-03-16 NOTE — PROGRESS NOTES
Subjective   Patient ID: Darcy Kim is a 47 y.o. female who presents for UTI (PT states she has UTI Frequency , Blood in urine and burning.  ).    Difficulty Urinating   This is a new problem. The current episode started yesterday. The problem occurs every urination. The problem has been gradually worsening. The quality of the pain is described as burning. The pain is at a severity of 10/10. There has been no fever. She is Sexually active. There is No history of pyelonephritis. Associated symptoms include flank pain, frequency, hematuria and urgency. Pertinent negatives include no chills, hesitancy, nausea, possible pregnancy, sweats or vomiting.    PT states it started last night.  UTI (PT states she has UTI Frequency , Blood in urine and burning. Pt has flank pain urgency and frequency. Pt did have urinalysis at work. Patient reports the urine  did have leukocytes, blood and protein and the urine was delta in color.  Virtual or Telephone Consent    An interactive audio and video telecommunication system which permits real time communications between the patient (at the originating site) and provider (at the distant site) was utilized to provide this telehealth service.   Verbal consent was requested and obtained from Darcy Kim on this date, 03/16/23 for a telehealth visit.       Review of Systems   Constitutional:  Negative for chills.   Gastrointestinal:  Negative for nausea and vomiting.   Genitourinary:  Positive for dysuria, flank pain, frequency, hematuria and urgency. Negative for hesitancy.         Objective   There were no vitals taken for this visit.    Physical Exam    Virtual visit today patient was not seen in office a virtual encounter was completed for this patient    Assessment/Plan   Problem List Items Addressed This Visit    None  Visit Diagnoses       Acute cystitis with hematuria    -  Primary    Relevant Medications    ciprofloxacin (Cipro) 250 mg tablet

## 2023-03-16 NOTE — PROGRESS NOTES
Subjective   Patient ID: Darcy Kim is a 47 y.o. female who presents for Blood in Urine (Patient is here following virtual visit to leave urine for UA and culture. ).    HPI     Review of Systems    Objective   There were no vitals taken for this visit.    Physical Exam    Assessment/Plan

## 2023-03-16 NOTE — PATIENT INSTRUCTIONS
Patient presents virtually for s.s of UTI     UTI symptoms   Per UA done via work and symptoms likely UTI   Pt will leave a urine culture today   Start cipro bid   Discussed increase fluids     Will follow up with results  all questions answered  follow up in office if signs or symptoms are worsening, not improving  or  please schedule follow up with PCP   if shortness of breath and or chest pain seek emergency department   24 hour hotline telephone numbers  Suicide or mental health mobile crisis help line [phone]  Child Abuse or neglect [phone]  Elder Abuse or neglect [phone]  Rape Crisis [phone]  Domestic Violence [phone]  Narcotics Anonymous [phone]

## 2023-03-21 LAB — URINE CULTURE: ABNORMAL

## 2023-03-24 ENCOUNTER — APPOINTMENT (OUTPATIENT)
Dept: PRIMARY CARE | Facility: CLINIC | Age: 48
End: 2023-03-24
Payer: COMMERCIAL

## 2023-03-27 DIAGNOSIS — N30.01 ACUTE CYSTITIS WITH HEMATURIA: Primary | ICD-10-CM

## 2023-03-27 RX ORDER — SULFAMETHOXAZOLE AND TRIMETHOPRIM 800; 160 MG/1; MG/1
1 TABLET ORAL 2 TIMES DAILY
Qty: 8 TABLET | Refills: 0 | Status: SHIPPED | OUTPATIENT
Start: 2023-03-27 | End: 2023-03-31

## 2023-03-27 NOTE — PROGRESS NOTES
Based off UA and Urine Culture will send a couple days of bactrim off to pharmacy and patient is to stop in on Friday to leave another urine specimen in the office patient is aware and ok patient is having a little back pain intermittently

## 2023-04-26 DIAGNOSIS — M25.562 PAIN IN LEFT KNEE: ICD-10-CM

## 2023-04-26 RX ORDER — IBUPROFEN 800 MG/1
TABLET ORAL
Qty: 90 TABLET | Refills: 1 | Status: SHIPPED | OUTPATIENT
Start: 2023-04-26 | End: 2023-08-21

## 2023-05-23 ENCOUNTER — OFFICE VISIT (OUTPATIENT)
Dept: FAMILY MEDICINE CLINIC | Age: 48
End: 2023-05-23
Payer: COMMERCIAL

## 2023-05-23 VITALS
OXYGEN SATURATION: 95 % | BODY MASS INDEX: 28 KG/M2 | TEMPERATURE: 98.5 F | HEIGHT: 63 IN | DIASTOLIC BLOOD PRESSURE: 64 MMHG | HEART RATE: 93 BPM | WEIGHT: 158 LBS | SYSTOLIC BLOOD PRESSURE: 108 MMHG

## 2023-05-23 DIAGNOSIS — L20.9 ATOPIC DERMATITIS OF SCALP: Primary | ICD-10-CM

## 2023-05-23 DIAGNOSIS — E11.9 TYPE 2 DIABETES MELLITUS WITHOUT COMPLICATIONS (MULTI): ICD-10-CM

## 2023-05-23 PROCEDURE — 99203 OFFICE O/P NEW LOW 30 MIN: CPT

## 2023-05-23 RX ORDER — ESTERIFIED ESTROGEN AND METHYLTESTOSTERONE .625; 1.25 MG/1; MG/1
1 TABLET ORAL DAILY
COMMUNITY

## 2023-05-23 RX ORDER — FLUOCINONIDE TOPICAL SOLUTION USP, 0.05% 0.5 MG/ML
SOLUTION TOPICAL
Qty: 40 ML | Refills: 0 | Status: SHIPPED | OUTPATIENT
Start: 2023-05-23

## 2023-05-23 RX ORDER — EMPAGLIFLOZIN 25 MG/1
TABLET, FILM COATED ORAL
Qty: 90 TABLET | Refills: 1 | Status: SHIPPED | OUTPATIENT
Start: 2023-05-23 | End: 2023-12-05 | Stop reason: SDUPTHER

## 2023-05-23 SDOH — ECONOMIC STABILITY: INCOME INSECURITY: HOW HARD IS IT FOR YOU TO PAY FOR THE VERY BASICS LIKE FOOD, HOUSING, MEDICAL CARE, AND HEATING?: NOT HARD AT ALL

## 2023-05-23 SDOH — ECONOMIC STABILITY: HOUSING INSECURITY
IN THE LAST 12 MONTHS, WAS THERE A TIME WHEN YOU DID NOT HAVE A STEADY PLACE TO SLEEP OR SLEPT IN A SHELTER (INCLUDING NOW)?: NO

## 2023-05-23 SDOH — ECONOMIC STABILITY: FOOD INSECURITY: WITHIN THE PAST 12 MONTHS, YOU WORRIED THAT YOUR FOOD WOULD RUN OUT BEFORE YOU GOT MONEY TO BUY MORE.: NEVER TRUE

## 2023-05-23 SDOH — ECONOMIC STABILITY: FOOD INSECURITY: WITHIN THE PAST 12 MONTHS, THE FOOD YOU BOUGHT JUST DIDN'T LAST AND YOU DIDN'T HAVE MONEY TO GET MORE.: NEVER TRUE

## 2023-05-23 ASSESSMENT — PATIENT HEALTH QUESTIONNAIRE - PHQ9
9. THOUGHTS THAT YOU WOULD BE BETTER OFF DEAD, OR OF HURTING YOURSELF: 0
SUM OF ALL RESPONSES TO PHQ QUESTIONS 1-9: 0
SUM OF ALL RESPONSES TO PHQ QUESTIONS 1-9: 0
4. FEELING TIRED OR HAVING LITTLE ENERGY: 0
1. LITTLE INTEREST OR PLEASURE IN DOING THINGS: 0
10. IF YOU CHECKED OFF ANY PROBLEMS, HOW DIFFICULT HAVE THESE PROBLEMS MADE IT FOR YOU TO DO YOUR WORK, TAKE CARE OF THINGS AT HOME, OR GET ALONG WITH OTHER PEOPLE: 0
8. MOVING OR SPEAKING SO SLOWLY THAT OTHER PEOPLE COULD HAVE NOTICED. OR THE OPPOSITE, BEING SO FIGETY OR RESTLESS THAT YOU HAVE BEEN MOVING AROUND A LOT MORE THAN USUAL: 0
SUM OF ALL RESPONSES TO PHQ QUESTIONS 1-9: 0
5. POOR APPETITE OR OVEREATING: 0
SUM OF ALL RESPONSES TO PHQ QUESTIONS 1-9: 0
2. FEELING DOWN, DEPRESSED OR HOPELESS: 0
7. TROUBLE CONCENTRATING ON THINGS, SUCH AS READING THE NEWSPAPER OR WATCHING TELEVISION: 0
3. TROUBLE FALLING OR STAYING ASLEEP: 0
SUM OF ALL RESPONSES TO PHQ9 QUESTIONS 1 & 2: 0
6. FEELING BAD ABOUT YOURSELF - OR THAT YOU ARE A FAILURE OR HAVE LET YOURSELF OR YOUR FAMILY DOWN: 0

## 2023-05-23 ASSESSMENT — ENCOUNTER SYMPTOMS
RESPIRATORY NEGATIVE: 1
EYES NEGATIVE: 1
COLOR CHANGE: 0

## 2023-05-23 ASSESSMENT — COLUMBIA-SUICIDE SEVERITY RATING SCALE - C-SSRS
1. WITHIN THE PAST MONTH, HAVE YOU WISHED YOU WERE DEAD OR WISHED YOU COULD GO TO SLEEP AND NOT WAKE UP?: NO
2. HAVE YOU ACTUALLY HAD ANY THOUGHTS OF KILLING YOURSELF?: NO
6. HAVE YOU EVER DONE ANYTHING, STARTED TO DO ANYTHING, OR PREPARED TO DO ANYTHING TO END YOUR LIFE?: NO

## 2023-05-23 NOTE — TELEPHONE ENCOUNTER
PT IS OUT OF EMPAGLIFLOZIN 25MG AND NEEDS IT RENEWED, PLEASE ADVISE AND SEND OVER WHEN POSSIBLE.  [facility] [place]

## 2023-05-23 NOTE — PROGRESS NOTES
[address]          ASSESSMENT/PLAN     Selena Jain is a 50 y.o. female who presents with:  Itching rash to posterior head and neck it is not painful. Started out as a small area and has gradually been spreading patient is unaware of any exposure that could be causing the rash. She has not changed shampoos hair care products she has no new pillows or furniture. Papular rash not isolated to hair follicles along the occipital and posterior neck area. There is no exudate no vesicles no flaking of skin. 1. Atopic dermatitis of scalp           PATIENT REFERRED TO:  Return if symptoms worsen or fail to improve. DISCHARGE MEDICATIONS:  New Prescriptions    FLUOCINONIDE (LIDEX) 0.05 % EXTERNAL SOLUTION    Apply topically 2 times daily. X 1 week     Cannot display discharge medications since this is not an admission. NICOLLE Javier - CNP    CHIEF COMPLAINT       Chief Complaint   Patient presents with    Rash     Rash on back of neck spreading behind ears x1.5 weeks          SUBJECTIVE/REVIEW OF SYSTEMS     Review of Systems   Constitutional: Negative. Negative for fatigue. Eyes: Negative. Respiratory: Negative. Cardiovascular: Negative. Endocrine: Negative. Skin:  Positive for rash. Negative for color change and wound. Neurological:  Negative for numbness. Hematological:  Negative for adenopathy. Psychiatric/Behavioral: Negative. OBJECTIVE/PHYSICAL EXAM     Physical Exam  Vitals reviewed. Constitutional:       General: She is not in acute distress. Appearance: Normal appearance. HENT:      Head: Normocephalic. Mouth/Throat:      Mouth: Mucous membranes are moist.   Eyes:      Conjunctiva/sclera: Conjunctivae normal.   Cardiovascular:      Rate and Rhythm: Normal rate. Pulmonary:      Effort: Pulmonary effort is normal.   Musculoskeletal:         General: Normal range of motion. Skin:     General: Skin is warm.       Capillary

## 2023-05-24 DIAGNOSIS — Z00.00 ENCOUNTER FOR GENERAL ADULT MEDICAL EXAMINATION WITHOUT ABNORMAL FINDINGS: ICD-10-CM

## 2023-05-24 RX ORDER — ESTERIFIED ESTROGEN AND METHYLTESTOSTERONE .625; 1.25 MG/1; MG/1
TABLET ORAL
Qty: 30 TABLET | Refills: 3 | Status: SHIPPED | OUTPATIENT
Start: 2023-05-24 | End: 2023-10-03

## 2023-06-02 ENCOUNTER — TELEMEDICINE (OUTPATIENT)
Dept: PRIMARY CARE | Facility: CLINIC | Age: 48
End: 2023-06-02
Payer: COMMERCIAL

## 2023-06-02 DIAGNOSIS — R22.43 LOCALIZED SWELLING OF BOTH LOWER LEGS: Primary | ICD-10-CM

## 2023-06-02 DIAGNOSIS — R22.42 LOCALIZED SWELLING OF LEFT FOOT: ICD-10-CM

## 2023-06-02 DIAGNOSIS — E78.5 DYSLIPIDEMIA: ICD-10-CM

## 2023-06-02 DIAGNOSIS — E11.9 TYPE 2 DIABETES MELLITUS WITHOUT COMPLICATION, WITHOUT LONG-TERM CURRENT USE OF INSULIN (MULTI): ICD-10-CM

## 2023-06-02 PROBLEM — R23.2 HOT FLASHES: Status: ACTIVE | Noted: 2023-06-02

## 2023-06-02 PROBLEM — B00.9 HERPES SIMPLEX: Status: ACTIVE | Noted: 2023-06-02

## 2023-06-02 PROBLEM — M25.461 KNEE EFFUSION, RIGHT: Status: ACTIVE | Noted: 2023-06-02

## 2023-06-02 PROBLEM — M25.551 BILATERAL HIP PAIN: Status: ACTIVE | Noted: 2023-06-02

## 2023-06-02 PROBLEM — G57.00 PIRIFORMIS SYNDROME: Status: ACTIVE | Noted: 2023-06-02

## 2023-06-02 PROBLEM — R10.2 FEMALE PELVIC PAIN: Status: ACTIVE | Noted: 2023-06-02

## 2023-06-02 PROBLEM — R55 VASOMOTOR INSTABILITY: Status: ACTIVE | Noted: 2023-06-02

## 2023-06-02 PROBLEM — F41.9 ANXIETY: Status: ACTIVE | Noted: 2023-06-02

## 2023-06-02 PROBLEM — S83.232A COMPLEX TEAR OF MEDIAL MENISCUS OF LEFT KNEE AS CURRENT INJURY: Status: ACTIVE | Noted: 2023-06-02

## 2023-06-02 PROBLEM — M25.552 BILATERAL HIP PAIN: Status: ACTIVE | Noted: 2023-06-02

## 2023-06-02 PROBLEM — M22.2X1 PATELLOFEMORAL SYNDROME, RIGHT: Status: ACTIVE | Noted: 2023-06-02

## 2023-06-02 PROCEDURE — 99213 OFFICE O/P EST LOW 20 MIN: CPT | Performed by: FAMILY MEDICINE

## 2023-06-02 RX ORDER — DULAGLUTIDE 0.75 MG/.5ML
0.75 INJECTION, SOLUTION SUBCUTANEOUS
Qty: 2 ML | Refills: 1 | Status: CANCELLED | OUTPATIENT
Start: 2023-06-02

## 2023-06-02 RX ORDER — TIRZEPATIDE 2.5 MG/.5ML
2.5 INJECTION, SOLUTION SUBCUTANEOUS
Qty: 2 ML | Refills: 0 | Status: SHIPPED | OUTPATIENT
Start: 2023-06-02 | End: 2023-06-27

## 2023-06-02 RX ORDER — FUROSEMIDE 20 MG/1
20 TABLET ORAL DAILY
Qty: 30 TABLET | Refills: 0 | Status: SHIPPED | OUTPATIENT
Start: 2023-06-02 | End: 2023-06-26

## 2023-06-02 ASSESSMENT — ENCOUNTER SYMPTOMS
SLEEP DISTURBANCE: 0
NECK STIFFNESS: 0
RECTAL PAIN: 0
HEADACHES: 1
POLYPHAGIA: 0
JOINT SWELLING: 1
DECREASED CONCENTRATION: 0
CONFUSION: 0
NERVOUS/ANXIOUS: 0
ACTIVITY CHANGE: 0
SEIZURES: 0
MYALGIAS: 0
SPEECH DIFFICULTY: 0
CONSTITUTIONAL NEGATIVE: 1
COUGH: 0
SINUS PAIN: 0
ABDOMINAL DISTENTION: 0
DIARRHEA: 0
FLANK PAIN: 0
HEMATURIA: 0
EYE PAIN: 0
AGITATION: 0
RHINORRHEA: 0
BLOOD IN STOOL: 0
SINUS PRESSURE: 0
CONSTIPATION: 0
STRIDOR: 0
TROUBLE SWALLOWING: 0
ADENOPATHY: 0
PHOTOPHOBIA: 0
DYSPHORIC MOOD: 0
FEVER: 0
COLOR CHANGE: 0
DIZZINESS: 0
SORE THROAT: 0
SHORTNESS OF BREATH: 0
DYSURIA: 0
CHEST TIGHTNESS: 0
ARTHRALGIAS: 0
PALPITATIONS: 0
ABDOMINAL PAIN: 0
APPETITE CHANGE: 0
FATIGUE: 0
POLYDIPSIA: 0

## 2023-06-02 NOTE — PROGRESS NOTES
Subjective   Patient ID: Darcy Kim is a 47 y.o. female who presents for Foot Swelling and Headache.    HPI     Patient is having left foot swelling for 5 days. She stated the swelling was first in leg and then extended to the top of foot. She has no pain but the skin is very tight.  She has tried elevating the foot.    She is having headaches for the last week. Sh eis not sure if the headaches are related to her diabetes.   Her blood sugar has been taken after lunch 2 days ago and was 190.  She will need refill of the Trulicity.     Review of Systems   Constitutional: Negative.  Negative for activity change, appetite change, fatigue and fever.   HENT:  Negative for congestion, dental problem, ear discharge, ear pain, mouth sores, rhinorrhea, sinus pressure, sinus pain, sore throat, tinnitus and trouble swallowing.    Eyes:  Negative for photophobia, pain and visual disturbance.   Respiratory:  Negative for cough, chest tightness, shortness of breath and stridor.    Cardiovascular:  Negative for chest pain and palpitations.   Gastrointestinal:  Negative for abdominal distention, abdominal pain, blood in stool, constipation, diarrhea and rectal pain.   Endocrine: Negative for cold intolerance, heat intolerance, polydipsia, polyphagia and polyuria.   Genitourinary:  Negative for dysuria, flank pain, hematuria and urgency.   Musculoskeletal:  Positive for joint swelling. Negative for arthralgias, gait problem, myalgias and neck stiffness.   Skin:  Negative for color change and rash.   Allergic/Immunologic: Negative for environmental allergies and food allergies.   Neurological:  Positive for headaches. Negative for dizziness, seizures, syncope and speech difficulty.   Hematological:  Negative for adenopathy.   Psychiatric/Behavioral:  Negative for agitation, confusion, decreased concentration, dysphoric mood and sleep disturbance. The patient is not nervous/anxious.        Objective   LMP  (LMP Unknown)      Physical Exam  Constitutional: Well developed, well nourished, alert and in no acute distress   Psychiatric: Mood calm and affect normal    Virtual Visit - Audio and Visual Communication Real Time      Assessment/Plan   Problem List Items Addressed This Visit    None  Visit Diagnoses       Localized swelling of both lower legs    -  Primary    Relevant Medications    furosemide (Lasix) 20 mg tablet    Type 2 diabetes mellitus without complication, without long-term current use of insulin (CMS/[facility])        Relevant Medications    tirzepatide (Mounjaro) 2.5 mg/0.5 mL pen injector    Other Relevant Orders    Thyroid Stimulating Hormone    Hemoglobin A1C    Localized swelling of left foot        Relevant Medications    furosemide (Lasix) 20 mg tablet    Dyslipidemia        Relevant Orders    Comprehensive Metabolic Panel    Lipid Panel    CBC and Auto Differential              Scribe Attestation  By signing my name below, I, JOSÉ Loza , Scribe   attest that this documentation has been prepared under the direction and in the presence of Mike Pierce DO.   Provider Attestation - Scribe documentation    All medical record entries made by the Scribe were at my direction and personally dictated by me. I have reviewed the chart and agree that the record accurately reflects my personal performance of the history, physical exam, discussion and plan.

## 2023-06-02 NOTE — PATIENT INSTRUCTIONS
Follow up in 4 weeks    Continue current medications and therapy for chronic medical conditions.    Patient was advised importance of proper diet/nutrition in addition adequate hydration. Patient was encouraged moderate exercise program to include 30 minutes daily for 5 days of the week or 150 minutes weekly. Patient will follow-up with us as scheduled.    OBTAIN FASTING LIPID, CMP, CBC, TSH AND A1C     START FUROSEMIDE 20MG ONCE DAILY     STOP TRULICITY     START MOUNJARO 2.5MG ONCE WEEKLY

## 2023-06-24 DIAGNOSIS — R22.43 LOCALIZED SWELLING OF BOTH LOWER LEGS: ICD-10-CM

## 2023-06-24 DIAGNOSIS — R22.42 LOCALIZED SWELLING OF LEFT FOOT: ICD-10-CM

## 2023-06-26 DIAGNOSIS — E11.9 TYPE 2 DIABETES MELLITUS WITHOUT COMPLICATION, WITHOUT LONG-TERM CURRENT USE OF INSULIN (MULTI): ICD-10-CM

## 2023-06-26 RX ORDER — FUROSEMIDE 20 MG/1
TABLET ORAL
Qty: 30 TABLET | Refills: 2 | Status: SHIPPED | OUTPATIENT
Start: 2023-06-26 | End: 2023-09-22

## 2023-06-26 NOTE — TELEPHONE ENCOUNTER
DR. VALVERDE PT:  REFILL ON [facility]    Pharmacy    [id number] [place] [place] - [address] [place] [address]

## 2023-06-27 RX ORDER — TIRZEPATIDE 2.5 MG/.5ML
INJECTION, SOLUTION SUBCUTANEOUS
Qty: 2 ML | Refills: 0 | Status: SHIPPED | OUTPATIENT
Start: 2023-06-27 | End: 2023-06-27 | Stop reason: SDUPTHER

## 2023-06-28 RX ORDER — TIRZEPATIDE 2.5 MG/.5ML
2.5 INJECTION, SOLUTION SUBCUTANEOUS
Qty: 2 ML | Refills: 1 | Status: SHIPPED | OUTPATIENT
Start: 2023-06-28 | End: 2023-10-09 | Stop reason: SDUPTHER

## 2023-06-30 ENCOUNTER — APPOINTMENT (OUTPATIENT)
Dept: PRIMARY CARE | Facility: CLINIC | Age: 48
End: 2023-06-30
Payer: COMMERCIAL

## 2023-06-30 ENCOUNTER — TELEMEDICINE (OUTPATIENT)
Dept: PRIMARY CARE | Facility: CLINIC | Age: 48
End: 2023-06-30
Payer: COMMERCIAL

## 2023-06-30 DIAGNOSIS — R30.0 BURNING WITH URINATION: ICD-10-CM

## 2023-06-30 DIAGNOSIS — R39.9 SYMPTOMS OF URINARY TRACT INFECTION: Primary | ICD-10-CM

## 2023-06-30 DIAGNOSIS — R39.15 URGENCY OF URINATION: ICD-10-CM

## 2023-06-30 PROCEDURE — 87186 SC STD MICRODIL/AGAR DIL: CPT

## 2023-06-30 PROCEDURE — 99213 OFFICE O/P EST LOW 20 MIN: CPT | Performed by: NURSE PRACTITIONER

## 2023-06-30 PROCEDURE — 87077 CULTURE AEROBIC IDENTIFY: CPT

## 2023-06-30 PROCEDURE — 87086 URINE CULTURE/COLONY COUNT: CPT

## 2023-06-30 RX ORDER — SULFAMETHOXAZOLE AND TRIMETHOPRIM 800; 160 MG/1; MG/1
1 TABLET ORAL 2 TIMES DAILY
Qty: 14 TABLET | Refills: 0 | Status: SHIPPED | OUTPATIENT
Start: 2023-06-30 | End: 2023-07-07

## 2023-06-30 ASSESSMENT — ENCOUNTER SYMPTOMS
CHILLS: 0
FREQUENCY: 1
FLANK PAIN: 0
VOMITING: 0
DYSURIA: 1
SWEATS: 0
HEMATURIA: 1
NAUSEA: 0

## 2023-06-30 NOTE — PROGRESS NOTES
Subjective   Patient ID: Darcy Kim is a 48 y.o. female who presents for UTI.    UTI   This is a new problem. The current episode started in the past 7 days. The problem occurs every urination. The problem has been gradually worsening. The quality of the pain is described as burning. The pain is at a severity of 4/10. The pain is mild. There has been no fever. She is Sexually active. Associated symptoms include frequency, hematuria and urgency. Pertinent negatives include no chills, discharge, flank pain, hesitancy, nausea, possible pregnancy, sweats or vomiting.   Difficulty Urinating   This is a recurrent problem. The current episode started in the past 7 days. The problem occurs every urination. The problem has been gradually worsening. The quality of the pain is described as burning. The pain is at a severity of 6/10. There has been no fever. She is Sexually active. There is No history of pyelonephritis. Associated symptoms include frequency, hematuria and urgency. Pertinent negatives include no chills, discharge, flank pain, hesitancy, nausea, possible pregnancy, sweats or vomiting. She has tried acetaminophen for the symptoms. The treatment provided no relief.        Review of Systems   Constitutional:  Negative for chills.   Gastrointestinal:  Negative for nausea and vomiting.   Genitourinary:  Positive for dysuria, frequency, hematuria and urgency. Negative for flank pain and hesitancy.       Objective   LMP  (LMP Unknown)     Physical Exam    Assessment/Plan

## 2023-06-30 NOTE — PROGRESS NOTES
Subjective   Patient ID: Darcy Kim is a 48 y.o. female who is with complaint of symptoms of UTI.    HPI  Patient is a 48 y.o. female who CONSULTED AT [facility] VIRTUAL [facility] - PHONE ONLY today. Patient is with complaints of burning sensation on urination, increased urinary frequency, urgency of urination, sensation of inadequate emptying post voiding, lower abdominal discomfort (dysuria), and nocturia. She denies having low back pain, flank pain, incontinence, cloudy urine, blood in urine, nausea, vomiting, chills, nor fever. Patient states symptoms has been going on for 3 days. Patient has taken AZO medication for relief of symptoms.     Review of Systems  General: no weight loss, generally healthy, no fatigue  Head:  no headaches / sinus pain, no vertigo, no injury  Eyes: no diplopia, no tearing, no pain,   Ears: no change in hearing, no tinnitus, no bleeding, no vertigo  Mouth:  no dental difficulties, no gingival bleeding, no sore throat, no loss of sense of taste  Nose: no congestion, no  discharge, no bleeding, no obstruction, no loss of sense of smell  Neck: no stiffness, no pain, no tenderness, no masses, no bruit  Pulmonary: no dyspnea, no wheezing, no hemoptysis, no cough  Cardiovascular: no chest pain, no palpitations, no syncope, no orthopnea  Gastrointestinal: no change in appetite, no dysphagia, no abdominal pains, no diarrhea, no emesis, no melena  Genito Urinary: (+) burning sensation on urination, (+) increased urinary frequency, (+) urgency of urination, (+) sensation of inadequate emptying post voiding, (+) lower abdominal discomfort (dysuria), (+) nocturia, no low back pain, no flank pain, no incontinence, no cloudy urine, no blood in urine,   Musculoskeletal: no muscle ache, no joint pain, no limitation of range of motion, no paresthesia, no numbness  Constitutional: no fever, no chills, no night sweats    Objective   NO VITAL SIGNS TAKEN FOR THIS PATIENT (VIRTUAL VISIT  CONSULT - PHONE ONLY)    Physical Exam  PHYSICAL EXAMINATION WAS NOT DONE FOR THIS PATIENT (VIRTUAL VISIT CONSULT - PHONE ONLY)    Assessment/Plan   Problem List Items Addressed This Visit    None  Visit Diagnoses       Symptoms of urinary tract infection    -  Primary    Relevant Medications    sulfamethoxazole-trimethoprim (Bactrim DS) 800-160 mg tablet    Other Relevant Orders    Urine Culture    Burning with urination        Relevant Medications    sulfamethoxazole-trimethoprim (Bactrim DS) 800-160 mg tablet    Other Relevant Orders    Urine Culture    Urgency of urination        Relevant Medications    sulfamethoxazole-trimethoprim (Bactrim DS) 800-160 mg tablet    Other Relevant Orders    Urine Culture        DISCHARGE SUMMARY:   Probable diagnosis, differential diagnosis, treatment, treatment options, and probable complications were discussed and explained to patient. she was to take medication/s associated with this visit. she may take over-the-counter pain and/or fever medication if needed. Advised increased oral fluid intake (2 liters of water or more per day). Reinforced to continue personal hygiene. Patient to return to clinic if there is worsening or persistence of symptoms. Patient verbalized understanding.  Patient agreed to provide urine sample for urine c and s study when she goes home from work.  Patient to come back in 7 - 10 days if needed for worsening symptoms.

## 2023-06-30 NOTE — PATIENT INSTRUCTIONS
DISCHARGE SUMMARY:   Probable diagnosis, differential diagnosis, treatment, treatment options, and probable complications were discussed and explained to patient. she was to take medication/s associated with this visit. she may take over-the-counter pain and/or fever medication if needed. Advised increased oral fluid intake (2 liters of water or more per day). Reinforced to continue personal hygiene. Patient to return to clinic if there is worsening or persistence of symptoms. Patient verbalized understanding.  Patient agreed to provide urine sample for urine c and s study when she goes home from work.  Patient to come back in 7 - 10 days if needed for worsening symptoms.

## 2023-07-03 LAB — URINE CULTURE: ABNORMAL

## 2023-07-05 ENCOUNTER — DOCUMENTATION (OUTPATIENT)
Dept: PRIMARY CARE | Facility: CLINIC | Age: 48
End: 2023-07-05
Payer: COMMERCIAL

## 2023-07-05 NOTE — PROGRESS NOTES
I called and talked to patient. We discussed lab results. Patient states symptoms are getting better.

## 2023-08-18 DIAGNOSIS — M25.562 PAIN IN LEFT KNEE: ICD-10-CM

## 2023-08-21 RX ORDER — IBUPROFEN 800 MG/1
TABLET ORAL
Qty: 90 TABLET | Refills: 1 | Status: SHIPPED | OUTPATIENT
Start: 2023-08-21 | End: 2023-10-23

## 2023-09-22 DIAGNOSIS — R22.42 LOCALIZED SWELLING OF LEFT FOOT: ICD-10-CM

## 2023-09-22 DIAGNOSIS — R22.43 LOCALIZED SWELLING OF BOTH LOWER LEGS: ICD-10-CM

## 2023-09-22 RX ORDER — FUROSEMIDE 20 MG/1
TABLET ORAL
Qty: 90 TABLET | Refills: 1 | Status: SHIPPED | OUTPATIENT
Start: 2023-09-22

## 2023-10-02 DIAGNOSIS — Z00.00 ENCOUNTER FOR GENERAL ADULT MEDICAL EXAMINATION WITHOUT ABNORMAL FINDINGS: ICD-10-CM

## 2023-10-03 RX ORDER — ESTERIFIED ESTROGEN AND METHYLTESTOSTERONE .625; 1.25 MG/1; MG/1
TABLET ORAL
Qty: 30 TABLET | Refills: 3 | Status: SHIPPED | OUTPATIENT
Start: 2023-10-03 | End: 2024-02-01

## 2023-10-09 ENCOUNTER — PHARMACY VISIT (OUTPATIENT)
Dept: PHARMACY | Facility: CLINIC | Age: 48
End: 2023-10-09
Payer: COMMERCIAL

## 2023-10-09 DIAGNOSIS — E11.9 TYPE 2 DIABETES MELLITUS WITHOUT COMPLICATION, WITHOUT LONG-TERM CURRENT USE OF INSULIN (MULTI): ICD-10-CM

## 2023-10-09 PROCEDURE — RXMED WILLOW AMBULATORY MEDICATION CHARGE

## 2023-10-09 RX ORDER — TIRZEPATIDE 2.5 MG/.5ML
2.5 INJECTION, SOLUTION SUBCUTANEOUS
Qty: 2 ML | Refills: 1 | Status: SHIPPED | OUTPATIENT
Start: 2023-10-09 | End: 2023-10-27 | Stop reason: SINTOL

## 2023-10-23 DIAGNOSIS — M25.562 PAIN IN LEFT KNEE: ICD-10-CM

## 2023-10-23 RX ORDER — IBUPROFEN 800 MG/1
TABLET ORAL
Qty: 90 TABLET | Refills: 1 | Status: SHIPPED | OUTPATIENT
Start: 2023-10-23 | End: 2023-12-26

## 2023-10-27 ENCOUNTER — TELEPHONE (OUTPATIENT)
Dept: PRIMARY CARE | Facility: CLINIC | Age: 48
End: 2023-10-27

## 2023-10-27 ENCOUNTER — OFFICE VISIT (OUTPATIENT)
Dept: PRIMARY CARE | Facility: CLINIC | Age: 48
End: 2023-10-27
Payer: COMMERCIAL

## 2023-10-27 ENCOUNTER — APPOINTMENT (OUTPATIENT)
Dept: PRIMARY CARE | Facility: CLINIC | Age: 48
End: 2023-10-27
Payer: COMMERCIAL

## 2023-10-27 VITALS
HEART RATE: 91 BPM | HEIGHT: 62 IN | RESPIRATION RATE: 15 BRPM | DIASTOLIC BLOOD PRESSURE: 72 MMHG | WEIGHT: 155.4 LBS | BODY MASS INDEX: 28.6 KG/M2 | SYSTOLIC BLOOD PRESSURE: 110 MMHG | OXYGEN SATURATION: 98 %

## 2023-10-27 DIAGNOSIS — M67.40 MUCOID CYST OF JOINT: ICD-10-CM

## 2023-10-27 DIAGNOSIS — E78.5 DYSLIPIDEMIA: ICD-10-CM

## 2023-10-27 DIAGNOSIS — F41.9 ANXIETY: ICD-10-CM

## 2023-10-27 DIAGNOSIS — R23.2 HOT FLASHES: ICD-10-CM

## 2023-10-27 DIAGNOSIS — E11.9 TYPE 2 DIABETES MELLITUS WITHOUT COMPLICATION, WITHOUT LONG-TERM CURRENT USE OF INSULIN (MULTI): Primary | ICD-10-CM

## 2023-10-27 PROCEDURE — 99214 OFFICE O/P EST MOD 30 MIN: CPT | Performed by: FAMILY MEDICINE

## 2023-10-27 RX ORDER — TRETINOIN 0.5 MG/G
CREAM TOPICAL NIGHTLY
COMMUNITY
Start: 2023-08-18 | End: 2024-01-19 | Stop reason: ALTCHOICE

## 2023-10-27 RX ORDER — DICLOFENAC SODIUM 10 MG/G
4 GEL TOPICAL 4 TIMES DAILY
Qty: 100 G | Refills: 1 | Status: SHIPPED | OUTPATIENT
Start: 2023-10-27 | End: 2024-05-17 | Stop reason: WASHOUT

## 2023-10-27 ASSESSMENT — ENCOUNTER SYMPTOMS
RECTAL PAIN: 0
FEVER: 0
HEMATURIA: 0
POLYDIPSIA: 0
SORE THROAT: 0
RHINORRHEA: 0
APPETITE CHANGE: 0
AGITATION: 0
NECK STIFFNESS: 0
CONFUSION: 0
CONSTITUTIONAL NEGATIVE: 1
ACTIVITY CHANGE: 0
DYSPHORIC MOOD: 0
SHORTNESS OF BREATH: 0
CONSTIPATION: 0
POLYPHAGIA: 0
DECREASED CONCENTRATION: 0
MYALGIAS: 0
SPEECH DIFFICULTY: 0
FATIGUE: 0
ARTHRALGIAS: 0
DIZZINESS: 0
DYSURIA: 0
COUGH: 0
PHOTOPHOBIA: 0
COLOR CHANGE: 0
FLANK PAIN: 0
NERVOUS/ANXIOUS: 0
SINUS PRESSURE: 0
PALPITATIONS: 0
HEADACHES: 0
BLOOD IN STOOL: 0
BLURRED VISION: 1
DIARRHEA: 0
SEIZURES: 0
SINUS PAIN: 0
STRIDOR: 0
ADENOPATHY: 0
ABDOMINAL DISTENTION: 0
EYE PAIN: 0
ABDOMINAL PAIN: 0
SLEEP DISTURBANCE: 0
CHEST TIGHTNESS: 0
TROUBLE SWALLOWING: 0

## 2023-10-27 NOTE — PATIENT INSTRUCTIONS
Follow up in 3 weeks    Continue current medications and therapy for chronic medical conditions.    Patient was advised importance of proper diet/nutrition in addition adequate hydration. Patient was encouraged moderate exercise program to include 30 minutes daily for 5 days of the week or 150 minutes weekly. Patient will follow-up with us as scheduled.    STOP MOUNJARO     START VICTOZA kidney dosing    OBTAIN FASTING LIPID, CMP, CBC, TSH AND A1C

## 2023-10-27 NOTE — PROGRESS NOTES
Subjective   Patient ID: Darcy Kim is a 48 y.o. female who presents for Diabetes.    Patient is here for follow up on diabetes.    Patient states that the mounjaro does not works for her. Patient is having headaches every day. Patient states have a painful constipation. Patient states her glucose is not stable. Patient would like medication review.    Patient states have a bump in her left thumb. Patient states it hurts. Patient states have it x 3 weeks now. Patient states is swelling.    Patient would like discuss A1C.    Diabetes  She presents for her follow-up diabetic visit. She has type 2 diabetes mellitus. There are no hypoglycemic associated symptoms. Pertinent negatives for hypoglycemia include no confusion, dizziness, headaches, nervousness/anxiousness, seizures or speech difficulty. Associated symptoms include blurred vision. Pertinent negatives for diabetes include no chest pain, no fatigue, no polydipsia, no polyphagia and no polyuria. There are no hypoglycemic complications. There are no diabetic complications. There are no known risk factors for coronary artery disease.        Review of Systems   Constitutional: Negative.  Negative for activity change, appetite change, fatigue and fever.   HENT:  Negative for congestion, dental problem, ear discharge, ear pain, mouth sores, rhinorrhea, sinus pressure, sinus pain, sore throat, tinnitus and trouble swallowing.    Eyes:  Positive for blurred vision. Negative for photophobia, pain and visual disturbance.   Respiratory:  Negative for cough, chest tightness, shortness of breath and stridor.    Cardiovascular:  Negative for chest pain and palpitations.   Gastrointestinal:  Negative for abdominal distention, abdominal pain, blood in stool, constipation, diarrhea and rectal pain.   Endocrine: Negative for cold intolerance, heat intolerance, polydipsia, polyphagia and polyuria.   Genitourinary:  Negative for dysuria, flank pain, hematuria and  "urgency.   Musculoskeletal:  Negative for arthralgias, gait problem, myalgias and neck stiffness.   Skin:  Negative for color change and rash.   Allergic/Immunologic: Negative for environmental allergies and food allergies.   Neurological:  Negative for dizziness, seizures, syncope, speech difficulty and headaches.   Hematological:  Negative for adenopathy.   Psychiatric/Behavioral:  Negative for agitation, confusion, decreased concentration, dysphoric mood and sleep disturbance. The patient is not nervous/anxious.        Objective   /72 (BP Location: Right arm, Patient Position: Sitting, BP Cuff Size: Adult)   Pulse 91   Resp 15   Ht 1.575 m (5' 2\")   Wt 70.5 kg (155 lb 6.4 oz)   LMP  (LMP Unknown)   SpO2 98%   BMI 28.42 kg/m²     Physical Exam  Vitals reviewed.   Constitutional:       General: She is not in acute distress.     Appearance: Normal appearance. She is normal weight. She is not ill-appearing or diaphoretic.   HENT:      Head: Normocephalic.      Right Ear: Tympanic membrane and external ear normal.      Left Ear: Tympanic membrane and external ear normal.      Nose: Nose normal. No congestion.      Mouth/Throat:      Pharynx: No posterior oropharyngeal erythema.   Eyes:      General:         Right eye: No discharge.         Left eye: No discharge.      Extraocular Movements: Extraocular movements intact.      Conjunctiva/sclera: Conjunctivae normal.      Pupils: Pupils are equal, round, and reactive to light.   Cardiovascular:      Rate and Rhythm: Normal rate and regular rhythm.      Pulses: Normal pulses.      Heart sounds: Normal heart sounds. No murmur heard.  Pulmonary:      Effort: Pulmonary effort is normal. No respiratory distress.      Breath sounds: Normal breath sounds. No wheezing or rales.   Chest:      Chest wall: No tenderness.   Abdominal:      General: Abdomen is flat. Bowel sounds are normal. There is no distension.      Palpations: There is no mass.      Tenderness: " There is no abdominal tenderness. There is no guarding.   Musculoskeletal:         General: No tenderness. Normal range of motion.      Cervical back: Normal range of motion and neck supple. No tenderness.      Right lower leg: No edema.      Left lower leg: No edema.   Skin:     General: Skin is dry.      Coloration: Skin is not jaundiced.      Findings: No bruising, erythema or rash.   Neurological:      General: No focal deficit present.      Mental Status: She is alert and oriented to person, place, and time. Mental status is at baseline.      Cranial Nerves: No cranial nerve deficit.      Sensory: No sensory deficit.      Coordination: Coordination normal.      Gait: Gait normal.   Psychiatric:         Mood and Affect: Mood normal.         Thought Content: Thought content normal.         Judgment: Judgment normal.         Assessment/Plan   Problem List Items Addressed This Visit             ICD-10-CM    Anxiety F41.9    Hot flashes R23.2    Relevant Orders    Thyroid Stimulating Hormone    Dyslipidemia E78.5    Type 2 diabetes mellitus without complication, without long-term current use of insulin (CMS/[facility]) - Primary E11.9    Relevant Medications    liraglutide (Victoza) 0.6 mg/0.1 mL (18 mg/3 mL) injection    Other Relevant Orders    Comprehensive Metabolic Panel    Lipid Panel    Hemoglobin A1C    CBC and Auto Differential     Other Visit Diagnoses         Codes    Mucoid cyst of joint     M67.40    Relevant Medications    diclofenac sodium (Voltaren) 1 % gel gel              Scribe Attestation  By signing my name below, IAna RMA , Brandon   attest that this documentation has been prepared under the direction and in the presence of Mike Pierce DO.   Provider Attestation - Scribe documentation    All medical record entries made by the Scribe were at my direction and personally dictated by me. I have reviewed the chart and agree that the record accurately reflects my personal  performance of the history, physical exam, discussion and plan.

## 2023-10-31 NOTE — TELEPHONE ENCOUNTER
Dr Pierce pt    Pt phoned office and stated she is waiting on her victoza to get a PA approval and has been off her diabetic medication since Saturday and wants to know if she should take something else or is she ok without it. Please advise    [phone]

## 2023-11-01 ENCOUNTER — TELEPHONE (OUTPATIENT)
Dept: PRIMARY CARE | Facility: CLINIC | Age: 48
End: 2023-11-01
Payer: COMMERCIAL

## 2023-11-01 NOTE — TELEPHONE ENCOUNTER
Patient of Dr. Stan Cortes for victoza was denied because insurance wants her to try both trulicity and ozempic which she said she has been on before. I will start an appeal because I know she has been  on victoza in the past. I  tried to call patient  but no answer

## 2023-11-13 ENCOUNTER — APPOINTMENT (OUTPATIENT)
Dept: PRIMARY CARE | Facility: CLINIC | Age: 48
End: 2023-11-13
Payer: COMMERCIAL

## 2023-11-13 ENCOUNTER — CLINICAL SUPPORT (OUTPATIENT)
Dept: PRIMARY CARE | Facility: CLINIC | Age: 48
End: 2023-11-13
Payer: COMMERCIAL

## 2023-11-13 ENCOUNTER — LAB (OUTPATIENT)
Dept: LAB | Facility: LAB | Age: 48
End: 2023-11-13
Payer: COMMERCIAL

## 2023-11-13 DIAGNOSIS — Z11.1 SCREENING-PULMONARY TB: Primary | ICD-10-CM

## 2023-11-13 DIAGNOSIS — E11.9 TYPE 2 DIABETES MELLITUS WITHOUT COMPLICATION, WITHOUT LONG-TERM CURRENT USE OF INSULIN (MULTI): ICD-10-CM

## 2023-11-13 DIAGNOSIS — R23.2 HOT FLASHES: ICD-10-CM

## 2023-11-13 LAB
ALBUMIN SERPL BCP-MCNC: 4.1 G/DL (ref 3.4–5)
ALP SERPL-CCNC: 89 U/L (ref 33–110)
ALT SERPL W P-5'-P-CCNC: 19 U/L (ref 7–45)
ANION GAP SERPL CALC-SCNC: 14 MMOL/L (ref 10–20)
AST SERPL W P-5'-P-CCNC: 12 U/L (ref 9–39)
BASOPHILS # BLD AUTO: 0.03 X10*3/UL (ref 0–0.1)
BASOPHILS NFR BLD AUTO: 0.5 %
BILIRUB SERPL-MCNC: 0.4 MG/DL (ref 0–1.2)
BUN SERPL-MCNC: 14 MG/DL (ref 6–23)
CALCIUM SERPL-MCNC: 8.7 MG/DL (ref 8.6–10.3)
CHLORIDE SERPL-SCNC: 105 MMOL/L (ref 98–107)
CHOLEST SERPL-MCNC: 182 MG/DL (ref 0–199)
CHOLESTEROL/HDL RATIO: 4.6
CO2 SERPL-SCNC: 23 MMOL/L (ref 21–32)
CREAT SERPL-MCNC: 0.65 MG/DL (ref 0.5–1.05)
EOSINOPHIL # BLD AUTO: 0.17 X10*3/UL (ref 0–0.7)
EOSINOPHIL NFR BLD AUTO: 3.1 %
ERYTHROCYTE [DISTWIDTH] IN BLOOD BY AUTOMATED COUNT: 12.6 % (ref 11.5–14.5)
EST. AVERAGE GLUCOSE BLD GHB EST-MCNC: 171 MG/DL
GFR SERPL CREATININE-BSD FRML MDRD: >90 ML/MIN/1.73M*2
GLUCOSE SERPL-MCNC: 165 MG/DL (ref 74–99)
HBA1C MFR BLD: 7.6 %
HCT VFR BLD AUTO: 50.2 % (ref 36–46)
HDLC SERPL-MCNC: 39.8 MG/DL
HGB BLD-MCNC: 16.6 G/DL (ref 12–16)
IMM GRANULOCYTES # BLD AUTO: 0.02 X10*3/UL (ref 0–0.7)
IMM GRANULOCYTES NFR BLD AUTO: 0.4 % (ref 0–0.9)
LDLC SERPL CALC-MCNC: 121 MG/DL
LYMPHOCYTES # BLD AUTO: 1.14 X10*3/UL (ref 1.2–4.8)
LYMPHOCYTES NFR BLD AUTO: 20.6 %
MCH RBC QN AUTO: 28.8 PG (ref 26–34)
MCHC RBC AUTO-ENTMCNC: 33.1 G/DL (ref 32–36)
MCV RBC AUTO: 87 FL (ref 80–100)
MONOCYTES # BLD AUTO: 0.43 X10*3/UL (ref 0.1–1)
MONOCYTES NFR BLD AUTO: 7.8 %
NEUTROPHILS # BLD AUTO: 3.74 X10*3/UL (ref 1.2–7.7)
NEUTROPHILS NFR BLD AUTO: 67.6 %
NON HDL CHOLESTEROL: 142 MG/DL (ref 0–149)
NRBC BLD-RTO: 0 /100 WBCS (ref 0–0)
PLATELET # BLD AUTO: 268 X10*3/UL (ref 150–450)
POTASSIUM SERPL-SCNC: 4 MMOL/L (ref 3.5–5.3)
PROT SERPL-MCNC: 7 G/DL (ref 6.4–8.2)
RBC # BLD AUTO: 5.77 X10*6/UL (ref 4–5.2)
SODIUM SERPL-SCNC: 138 MMOL/L (ref 136–145)
TRIGL SERPL-MCNC: 105 MG/DL (ref 0–149)
TSH SERPL-ACNC: 1.67 MIU/L (ref 0.44–3.98)
VLDL: 21 MG/DL (ref 0–40)
WBC # BLD AUTO: 5.5 X10*3/UL (ref 4.4–11.3)

## 2023-11-13 PROCEDURE — 80061 LIPID PANEL: CPT

## 2023-11-13 PROCEDURE — 83036 HEMOGLOBIN GLYCOSYLATED A1C: CPT

## 2023-11-13 PROCEDURE — 84443 ASSAY THYROID STIM HORMONE: CPT

## 2023-11-13 PROCEDURE — 85025 COMPLETE CBC W/AUTO DIFF WBC: CPT

## 2023-11-13 PROCEDURE — 80053 COMPREHEN METABOLIC PANEL: CPT

## 2023-11-13 PROCEDURE — 36415 COLL VENOUS BLD VENIPUNCTURE: CPT

## 2023-11-13 PROCEDURE — 86580 TB INTRADERMAL TEST: CPT | Performed by: FAMILY MEDICINE

## 2023-11-15 ENCOUNTER — CLINICAL SUPPORT (OUTPATIENT)
Dept: PRIMARY CARE | Facility: CLINIC | Age: 48
End: 2023-11-15
Payer: COMMERCIAL

## 2023-11-15 DIAGNOSIS — Z11.1 SCREENING-PULMONARY TB: ICD-10-CM

## 2023-11-15 NOTE — LETTER
November 15, 2023    Patient: Darcy Kim   YOB: 1975   Date of Visit: 11/15/2023       To Whom It May Concern:    Our clinic recently performed a tuberculosis skin test on  Ms. Kim. The results of this test are as follows:    This test was negative for tuberculosis exposure per current Centers for Disease Control guidelines.    A chest x-ray was not required.    If you have any questions or concerns, please don't hesitate to call.         Sincerely,        JOSÉ Smith

## 2023-11-15 NOTE — PROGRESS NOTES
LOVE patient    Patient presents in office for read of a 2 step TB.     TB placed on 11/13/23.    TB read on 11/15/23 as negative.

## 2023-11-16 ENCOUNTER — APPOINTMENT (OUTPATIENT)
Dept: PRIMARY CARE | Facility: CLINIC | Age: 48
End: 2023-11-16
Payer: COMMERCIAL

## 2023-11-20 ENCOUNTER — CLINICAL SUPPORT (OUTPATIENT)
Dept: PRIMARY CARE | Facility: CLINIC | Age: 48
End: 2023-11-20
Payer: COMMERCIAL

## 2023-11-20 DIAGNOSIS — Z11.1 TUBERCULOSIS SCREENING: ICD-10-CM

## 2023-11-20 PROCEDURE — 86580 TB INTRADERMAL TEST: CPT | Performed by: FAMILY MEDICINE

## 2023-11-21 ENCOUNTER — PHARMACY VISIT (OUTPATIENT)
Dept: PHARMACY | Facility: CLINIC | Age: 48
End: 2023-11-21
Payer: COMMERCIAL

## 2023-11-21 ENCOUNTER — TELEMEDICINE (OUTPATIENT)
Dept: PRIMARY CARE | Facility: CLINIC | Age: 48
End: 2023-11-21
Payer: COMMERCIAL

## 2023-11-21 DIAGNOSIS — E11.9 TYPE 2 DIABETES MELLITUS WITHOUT COMPLICATION, WITHOUT LONG-TERM CURRENT USE OF INSULIN (MULTI): ICD-10-CM

## 2023-11-21 DIAGNOSIS — F41.9 ANXIETY: Primary | ICD-10-CM

## 2023-11-21 DIAGNOSIS — B37.9 YEAST INFECTION: ICD-10-CM

## 2023-11-21 DIAGNOSIS — E78.5 DYSLIPIDEMIA: ICD-10-CM

## 2023-11-21 PROCEDURE — 99442 PR PHYS/QHP TELEPHONE EVALUATION 11-20 MIN: CPT | Performed by: FAMILY MEDICINE

## 2023-11-21 PROCEDURE — RXMED WILLOW AMBULATORY MEDICATION CHARGE

## 2023-11-21 RX ORDER — FLUCONAZOLE 150 MG/1
TABLET ORAL
Qty: 3 TABLET | Refills: 0 | Status: SHIPPED | OUTPATIENT
Start: 2023-11-21 | End: 2024-04-02 | Stop reason: ALTCHOICE

## 2023-11-21 ASSESSMENT — ENCOUNTER SYMPTOMS
NERVOUS/ANXIOUS: 0
TROUBLE SWALLOWING: 0
ABDOMINAL DISTENTION: 0
RECTAL PAIN: 0
NECK STIFFNESS: 0
SINUS PRESSURE: 0
POLYDIPSIA: 0
SPEECH DIFFICULTY: 0
ADENOPATHY: 0
DYSURIA: 0
SINUS PAIN: 0
CONSTITUTIONAL NEGATIVE: 1
DECREASED CONCENTRATION: 0
CHEST TIGHTNESS: 0
PALPITATIONS: 0
FLANK PAIN: 0
COLOR CHANGE: 0
CONFUSION: 0
SHORTNESS OF BREATH: 0
STRIDOR: 0
BLOOD IN STOOL: 0
DIZZINESS: 0
EYE PAIN: 0
POLYPHAGIA: 0
FEVER: 0
HEMATURIA: 0
FATIGUE: 0
ACTIVITY CHANGE: 0
SORE THROAT: 0
SEIZURES: 0
APPETITE CHANGE: 0
ARTHRALGIAS: 0
SLEEP DISTURBANCE: 0
COUGH: 0
DYSPHORIC MOOD: 0
MYALGIAS: 0
HEADACHES: 0
AGITATION: 0
RHINORRHEA: 0
CONSTIPATION: 0
PHOTOPHOBIA: 0
ABDOMINAL PAIN: 0
DIARRHEA: 0

## 2023-11-21 NOTE — PROGRESS NOTES
Subjective   Patient ID: Darcy Kim is a 48 y.o. female who presents for Results.    Patient is here for results.    Patient had done blood work on 11/13/2023.    Patient denies have any symptoms or concerns today.         Review of Systems   Constitutional: Negative.  Negative for activity change, appetite change, fatigue and fever.   HENT:  Negative for congestion, dental problem, ear discharge, ear pain, mouth sores, rhinorrhea, sinus pressure, sinus pain, sore throat, tinnitus and trouble swallowing.    Eyes:  Negative for photophobia, pain and visual disturbance.   Respiratory:  Negative for cough, chest tightness, shortness of breath and stridor.    Cardiovascular:  Negative for chest pain and palpitations.   Gastrointestinal:  Negative for abdominal distention, abdominal pain, blood in stool, constipation, diarrhea and rectal pain.   Endocrine: Negative for cold intolerance, heat intolerance, polydipsia, polyphagia and polyuria.   Genitourinary:  Negative for dysuria, flank pain, hematuria and urgency.   Musculoskeletal:  Negative for arthralgias, gait problem, myalgias and neck stiffness.   Skin:  Negative for color change and rash.   Allergic/Immunologic: Negative for environmental allergies and food allergies.   Neurological:  Negative for dizziness, seizures, syncope, speech difficulty and headaches.   Hematological:  Negative for adenopathy.   Psychiatric/Behavioral:  Negative for agitation, confusion, decreased concentration, dysphoric mood and sleep disturbance. The patient is not nervous/anxious.        Objective   LMP  (LMP Unknown)     Physical Exam  Neurological:      Mental Status: She is alert and oriented to person, place, and time.   Psychiatric:         Thought Content: Thought content normal.      Comments: Anxious         Assessment/Plan   Problem List Items Addressed This Visit             ICD-10-CM    Anxiety - Primary F41.9    Dyslipidemia E78.5    Relevant Orders    CBC and  Auto Differential    Iron and TIBC    Ferritin    Type 2 diabetes mellitus without complication, without long-term current use of insulin (CMS/[facility]) E11.9    Relevant Orders    Follow Up In [facility] - Pharmacy     Other Visit Diagnoses         Codes    Yeast infection     B37.9    Relevant Medications    fluconazole (Diflucan) 150 mg tablet             Scribe Attestation  By signing my name below, IMike DO   , Brandon   attest that this documentation has been prepared under the direction and in the presence of Mike Pierce DO.    Provider Attestation - Scribe documentation    All medical record entries made by the Scribe were at my direction and personally dictated by me. I have reviewed the chart and agree that the record accurately reflects my personal performance of the history, physical exam, discussion and plan.

## 2023-11-21 NOTE — PATIENT INSTRUCTIONS
Continue current medications and therapy for chronic medical conditions.    Patient was advised importance of proper diet/nutrition in addition adequate hydration. Patient was encouraged moderate exercise program to include 30 minutes daily for 5 days of the week or 150 minutes weekly. Patient will follow-up with us as scheduled.    Reviewed lab results from November 2023    Repeat CBC lab work at the end of the month     Follow-up in 3 months

## 2023-11-22 PROBLEM — R23.2 HOT FLASHES: Status: RESOLVED | Noted: 2023-06-02 | Resolved: 2023-11-22

## 2023-12-05 ENCOUNTER — TELEMEDICINE (OUTPATIENT)
Dept: PHARMACY | Facility: HOSPITAL | Age: 48
End: 2023-12-05
Payer: COMMERCIAL

## 2023-12-05 DIAGNOSIS — E11.9 TYPE 2 DIABETES MELLITUS WITHOUT COMPLICATIONS (MULTI): ICD-10-CM

## 2023-12-05 DIAGNOSIS — E11.9 TYPE 2 DIABETES MELLITUS WITHOUT COMPLICATION, WITHOUT LONG-TERM CURRENT USE OF INSULIN (MULTI): ICD-10-CM

## 2023-12-05 RX ORDER — PEN NEEDLE, DIABETIC 31 GX5/16"
NEEDLE, DISPOSABLE MISCELLANEOUS
Qty: 100 EACH | Refills: 3 | Status: SHIPPED | OUTPATIENT
Start: 2023-12-05 | End: 2024-05-17 | Stop reason: WASHOUT

## 2023-12-05 ASSESSMENT — ENCOUNTER SYMPTOMS: DIABETIC ASSOCIATED SYMPTOMS: 0

## 2023-12-05 NOTE — ASSESSMENT & PLAN NOTE
Patients diabetes is uncontrolled as evidenced by the most recent A1c of 7.6% on 11/13/2023.   Patient states she has been on many medications for her blood sugars, but Jardiance and Victoza are what work the best for the patient.  Ozempic, Trulicity, and Mounjaro all cause headache, low energy, and she did not feel right on these medications.  Will send refills for Jardiance and Pen [place] for Victoza to patients preferred pharmacy.  Patient does not usually test her blood sugars at home, but does have testing supplies. If patient states to get a headache, she typically will check her blood sugar and it is typically high, 150-160mg/dL. Encouraged the patient to check blood sugars 2-3 times per week in the morning, before food.  Discussed with the patient that her LDL has been high the past few lab draws, and a statin should be started, especially for secondary prevention with diabetes. Patient states she is aware and her and Dr. Pierce are keeping an eye on it, however she would not like to start any additional medications at this time.  Continue working towards healthy diet and lifestyle  Discussed maintaining a healthy diet and watching carbohydrate count at each meal. 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 Patient states if she goes through a drive thru for a burger, she will only have one bun. Patient is making conscious choices to help decrease her blood sugars.

## 2023-12-05 NOTE — PROGRESS NOTES
"Clinical Pharmacy Appointment    Subjective   Patient ID: Darcy Kim \"Darcy Kim\" is a 48 y.o. female who presents for Diabetes.    Referring Provider: Mike Pierce,      Diabetes  She presents for her initial diabetic visit. She has type 2 diabetes mellitus. Her disease course has been stable. There are no hypoglycemic associated symptoms. There are no diabetic associated symptoms. There are no hypoglycemic complications. Risk factors for coronary artery disease include family history, dyslipidemia and diabetes mellitus. Current diabetic treatments: Jardiance 25mg Take 1 tablet by mouth daily, Victoza 1.2mg under the skin daily. She is compliant with treatment all of the time. (Patient does not regularly check blood sugar at home.) She does not see a podiatrist.Eye exam is current.     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      Objective     Labs  Lab Results   Component Value Date    BILITOT 0.4 11/13/2023    CALCIUM 8.7 11/13/2023    CO2 23 11/13/2023     11/13/2023    CREATININE 0.65 11/13/2023    GLUCOSE 165 (H) 11/13/2023    ALKPHOS 89 11/13/2023    K 4.0 11/13/2023    PROT 7.0 11/13/2023     11/13/2023    AST 12 11/13/2023    ALT 19 " "11/13/2023    BUN 14 11/13/2023    ANIONGAP 14 11/13/2023    ALBUMIN 4.1 11/13/2023    GFRF >90 09/29/2022     Lab Results   Component Value Date    TRIG 105 11/13/2023    CHOL 182 11/13/2023    LDLCALC 121 (H) 11/13/2023    HDL 39.8 11/13/2023     Lab Results   Component Value Date    HGBA1C 7.6 (H) 11/13/2023     Current Outpatient Medications on File Prior to Visit   Medication Sig Dispense Refill    estrogens-methyltestosterone (Estratest HS) 0.625-1.25 mg tablet TAKE 1 TABLET BY MOUTH EVERY DAY 30 tablet 3    fluconazole (Diflucan) 150 mg tablet Take one tablet weekly until finished 3 tablet 0    furosemide (Lasix) 20 mg tablet TAKE 1 TABLET BY MOUTH EVERY DAY 90 tablet 1    ibuprofen 800 mg tablet TAKE 1 TABLET BY MOUTH THREE TIMES A DAY AS NEEDED 90 tablet 1    liraglutide (Victoza) 0.6 mg/0.1 mL (18 mg/3 mL) injection Inject 0.1 mL (0.6 mg) under the skin once daily for 7 days, THEN 0.2 mL (1.2 mg) once daily for 7 days, THEN 0.3 mL (1.8 mg) once daily. 7 mL 1    multivitamin (MULTIPLE VITAMINS ORAL) Take 1 tablet by mouth.      valACYclovir (Valtrex) 1 gram tablet Take 1 tablet (1,000 mg) by mouth 2 times a day as needed (for cold sores).      [DISCONTINUED] BD Ultra-Fine Short Pen Needle 31 gauge x 5/16\" needle use as directed.      [DISCONTINUED] Jardiance 25 mg TAKE 1 TABLET BY MOUTH EVERY DAY 90 tablet 1    diclofenac sodium (Voltaren) 1 % gel gel Apply 1 Application topically 4 times a day. (Patient not taking: Reported on 12/5/2023) 100 g 1    tretinoin (Retin-A) 0.05 % cream Apply topically once daily at bedtime.       No current facility-administered medications on file prior to visit.      Assessment/Plan   Problem List Items Addressed This Visit             ICD-10-CM    Type 2 diabetes mellitus without complication, without long-term current use of insulin (CMS/[facility]) E11.9     Patients diabetes is uncontrolled as evidenced by the most recent A1c of 7.6% on 11/13/2023.   Patient states she has been " "on many medications for her blood sugars, but Jardiance and Victoza are what work the best for the patient.  Ozempic, Trulicity, and Mounjaro all cause headache, low energy, and she did not feel right on these medications.  Will send refills for Jardiance and Pen [place] for Victoza to patients preferred pharmacy.  Patient does not usually test her blood sugars at home, but does have testing supplies. If patient states to get a headache, she typically will check her blood sugar and it is typically high, 150-160mg/dL. Encouraged the patient to check blood sugars 2-3 times per week in the morning, before food.  Discussed with the patient that her LDL has been high the past few lab draws, and a statin should be started, especially for secondary prevention with diabetes. Patient states she is aware and her and Dr. Pierce are keeping an eye on it, however she would not like to start any additional medications at this time.  Continue working towards healthy diet and lifestyle  Discussed maintaining a healthy diet and watching carbohydrate count at each meal. 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 Patient states if she goes through a drive thru for a burger, she will only have one bun. Patient is making conscious choices to help decrease her blood sugars.         Relevant Medications    BD Ultra-Fine Short Pen Needle 31 gauge x 5/16\" needle    Other Relevant Orders    Follow Up In [facility] - Pharmacy    Hemoglobin A1c     Other Visit Diagnoses         Codes    Type 2 diabetes mellitus without complications (CMS/[facility])     E11.9    Relevant Medications    empagliflozin (Jardiance) 25 mg    Other Relevant Orders    Follow Up In [facility] - Pharmacy    Hemoglobin A1c          Follow up with Clinical Pharmacy Team 2/27/2023 at 11:30AM.    Continue all meds under the continuation of care with the referring provider and " clinical pharmacy team.    Please reach out to the Clinical Pharmacy Team if there are any further questions.     Verbal consent to manage patient's drug therapy was obtained from patient. They were informed they may decline to participate or withdraw from participation in pharmacy services at any time.    Diana Veloz, PharmD  [phone]

## 2023-12-25 DIAGNOSIS — M25.562 PAIN IN LEFT KNEE: ICD-10-CM

## 2023-12-26 RX ORDER — IBUPROFEN 800 MG/1
TABLET ORAL
Qty: 270 TABLET | Refills: 0 | Status: SHIPPED | OUTPATIENT
Start: 2023-12-26 | End: 2024-03-25

## 2023-12-29 DIAGNOSIS — E11.9 TYPE 2 DIABETES MELLITUS WITHOUT COMPLICATION, WITHOUT LONG-TERM CURRENT USE OF INSULIN (MULTI): ICD-10-CM

## 2023-12-29 NOTE — TELEPHONE ENCOUNTER
PT NEEDS REFILL ON VICTOZA-  QUANTITY NEEDS TO BE CHANGED FROM 6 TO 9 IN ORDER FOR HER TO GET 3 PENS.  [facility] IN [place] ON [address]

## 2024-01-15 ENCOUNTER — LAB (OUTPATIENT)
Dept: LAB | Facility: LAB | Age: 49
End: 2024-01-15

## 2024-01-15 DIAGNOSIS — E78.5 DYSLIPIDEMIA: ICD-10-CM

## 2024-01-15 DIAGNOSIS — E11.9 TYPE 2 DIABETES MELLITUS WITHOUT COMPLICATION, WITHOUT LONG-TERM CURRENT USE OF INSULIN (MULTI): ICD-10-CM

## 2024-01-15 LAB
ALBUMIN SERPL BCP-MCNC: 4.1 G/DL (ref 3.4–5)
ALP SERPL-CCNC: 92 U/L (ref 33–110)
ALT SERPL W P-5'-P-CCNC: 19 U/L (ref 7–45)
ANION GAP SERPL CALC-SCNC: 13 MMOL/L (ref 10–20)
AST SERPL W P-5'-P-CCNC: 14 U/L (ref 9–39)
BASOPHILS # BLD AUTO: 0.05 X10*3/UL (ref 0–0.1)
BASOPHILS NFR BLD AUTO: 0.8 %
BILIRUB SERPL-MCNC: 0.3 MG/DL (ref 0–1.2)
BUN SERPL-MCNC: 12 MG/DL (ref 6–23)
CALCIUM SERPL-MCNC: 8.9 MG/DL (ref 8.6–10.3)
CHLORIDE SERPL-SCNC: 105 MMOL/L (ref 98–107)
CHOLEST SERPL-MCNC: 181 MG/DL (ref 0–199)
CHOLESTEROL/HDL RATIO: 4.5
CO2 SERPL-SCNC: 24 MMOL/L (ref 21–32)
CREAT SERPL-MCNC: 0.56 MG/DL (ref 0.5–1.05)
EGFRCR SERPLBLD CKD-EPI 2021: >90 ML/MIN/1.73M*2
EOSINOPHIL # BLD AUTO: 0.18 X10*3/UL (ref 0–0.7)
EOSINOPHIL NFR BLD AUTO: 3 %
ERYTHROCYTE [DISTWIDTH] IN BLOOD BY AUTOMATED COUNT: 12.7 % (ref 11.5–14.5)
EST. AVERAGE GLUCOSE BLD GHB EST-MCNC: 169 MG/DL
FERRITIN SERPL-MCNC: 195 NG/ML (ref 8–150)
GLUCOSE SERPL-MCNC: 129 MG/DL (ref 74–99)
HBA1C MFR BLD: 7.5 %
HCT VFR BLD AUTO: 49.7 % (ref 36–46)
HDLC SERPL-MCNC: 39.9 MG/DL
HGB BLD-MCNC: 16.5 G/DL (ref 12–16)
IMM GRANULOCYTES # BLD AUTO: 0.02 X10*3/UL (ref 0–0.7)
IMM GRANULOCYTES NFR BLD AUTO: 0.3 % (ref 0–0.9)
IRON SATN MFR SERPL: 19 % (ref 25–45)
IRON SERPL-MCNC: 70 UG/DL (ref 35–150)
LDLC SERPL CALC-MCNC: 125 MG/DL
LYMPHOCYTES # BLD AUTO: 1.35 X10*3/UL (ref 1.2–4.8)
LYMPHOCYTES NFR BLD AUTO: 22.8 %
MCH RBC QN AUTO: 28.9 PG (ref 26–34)
MCHC RBC AUTO-ENTMCNC: 33.2 G/DL (ref 32–36)
MCV RBC AUTO: 87 FL (ref 80–100)
MONOCYTES # BLD AUTO: 0.39 X10*3/UL (ref 0.1–1)
MONOCYTES NFR BLD AUTO: 6.6 %
NEUTROPHILS # BLD AUTO: 3.94 X10*3/UL (ref 1.2–7.7)
NEUTROPHILS NFR BLD AUTO: 66.5 %
NON HDL CHOLESTEROL: 141 MG/DL (ref 0–149)
NRBC BLD-RTO: 0 /100 WBCS (ref 0–0)
PLATELET # BLD AUTO: 275 X10*3/UL (ref 150–450)
POTASSIUM SERPL-SCNC: 3.9 MMOL/L (ref 3.5–5.3)
PROT SERPL-MCNC: 6.8 G/DL (ref 6.4–8.2)
RBC # BLD AUTO: 5.7 X10*6/UL (ref 4–5.2)
SODIUM SERPL-SCNC: 138 MMOL/L (ref 136–145)
TIBC SERPL-MCNC: 375 UG/DL (ref 240–445)
TRIGL SERPL-MCNC: 82 MG/DL (ref 0–149)
TSH SERPL-ACNC: 2.72 MIU/L (ref 0.44–3.98)
UIBC SERPL-MCNC: 305 UG/DL (ref 110–370)
VLDL: 16 MG/DL (ref 0–40)
WBC # BLD AUTO: 5.9 X10*3/UL (ref 4.4–11.3)

## 2024-01-15 PROCEDURE — 36415 COLL VENOUS BLD VENIPUNCTURE: CPT

## 2024-01-15 PROCEDURE — 84443 ASSAY THYROID STIM HORMONE: CPT

## 2024-01-15 PROCEDURE — 83540 ASSAY OF IRON: CPT

## 2024-01-15 PROCEDURE — 82728 ASSAY OF FERRITIN: CPT

## 2024-01-15 PROCEDURE — 80061 LIPID PANEL: CPT

## 2024-01-15 PROCEDURE — 83550 IRON BINDING TEST: CPT

## 2024-01-15 PROCEDURE — 80053 COMPREHEN METABOLIC PANEL: CPT

## 2024-01-15 PROCEDURE — 85025 COMPLETE CBC W/AUTO DIFF WBC: CPT

## 2024-01-15 PROCEDURE — 83036 HEMOGLOBIN GLYCOSYLATED A1C: CPT

## 2024-01-19 ENCOUNTER — TELEMEDICINE (OUTPATIENT)
Dept: PRIMARY CARE | Facility: CLINIC | Age: 49
End: 2024-01-19
Payer: COMMERCIAL

## 2024-01-19 DIAGNOSIS — E11.9 TYPE 2 DIABETES MELLITUS WITHOUT COMPLICATION, WITHOUT LONG-TERM CURRENT USE OF INSULIN (MULTI): Primary | ICD-10-CM

## 2024-01-19 DIAGNOSIS — E78.5 DYSLIPIDEMIA: ICD-10-CM

## 2024-01-19 DIAGNOSIS — B00.9 HERPES SIMPLEX: ICD-10-CM

## 2024-01-19 DIAGNOSIS — D75.1 POLYCYTHEMIA: ICD-10-CM

## 2024-01-19 PROCEDURE — 99442 PR PHYS/QHP TELEPHONE EVALUATION 11-20 MIN: CPT | Performed by: FAMILY MEDICINE

## 2024-01-19 RX ORDER — VALACYCLOVIR HYDROCHLORIDE 1 G/1
1000 TABLET, FILM COATED ORAL 2 TIMES DAILY PRN
Qty: 20 TABLET | Refills: 1 | Status: SHIPPED | OUTPATIENT
Start: 2024-01-19

## 2024-01-19 ASSESSMENT — ENCOUNTER SYMPTOMS
NECK STIFFNESS: 0
MYALGIAS: 0
CONFUSION: 0
STRIDOR: 0
NERVOUS/ANXIOUS: 0
CONSTIPATION: 0
SLEEP DISTURBANCE: 0
SORE THROAT: 0
SINUS PRESSURE: 0
DIARRHEA: 0
ABDOMINAL PAIN: 0
ACTIVITY CHANGE: 0
AGITATION: 0
POLYDIPSIA: 0
HEADACHES: 0
POLYPHAGIA: 0
RECTAL PAIN: 0
ADENOPATHY: 0
RHINORRHEA: 0
COLOR CHANGE: 0
DYSURIA: 0
DECREASED CONCENTRATION: 0
APPETITE CHANGE: 0
ABDOMINAL DISTENTION: 0
ARTHRALGIAS: 0
SEIZURES: 0
PHOTOPHOBIA: 0
DYSPHORIC MOOD: 0
HEMATURIA: 0
BLOOD IN STOOL: 0
DIZZINESS: 0
EYE PAIN: 0
CONSTITUTIONAL NEGATIVE: 1
SINUS PAIN: 0
SPEECH DIFFICULTY: 0
PALPITATIONS: 0
TROUBLE SWALLOWING: 0
SHORTNESS OF BREATH: 0
FATIGUE: 0
FLANK PAIN: 0
CHEST TIGHTNESS: 0
FEVER: 0
COUGH: 0

## 2024-01-19 NOTE — PROGRESS NOTES
Subjective   Patient ID: Darcy Kim is a 48 y.o. female who presents for Diabetes.    Diabetes  Pertinent negatives for hypoglycemia include no confusion, dizziness, headaches, nervousness/anxiousness, seizures or speech difficulty. Pertinent negatives for diabetes include no chest pain, no fatigue, no polydipsia, no polyphagia and no polyuria.     Patient is following up on diabetes and use of Victoza. Patient is measuring morning blood sugars at 120.    Patient had lab work done on 1/15/24 to review.     Review of Systems   Constitutional: Negative.  Negative for activity change, appetite change, fatigue and fever.   HENT:  Negative for congestion, dental problem, ear discharge, ear pain, mouth sores, rhinorrhea, sinus pressure, sinus pain, sore throat, tinnitus and trouble swallowing.    Eyes:  Negative for photophobia, pain and visual disturbance.   Respiratory:  Negative for cough, chest tightness, shortness of breath and stridor.    Cardiovascular:  Negative for chest pain and palpitations.   Gastrointestinal:  Negative for abdominal distention, abdominal pain, blood in stool, constipation, diarrhea and rectal pain.   Endocrine: Negative for cold intolerance, heat intolerance, polydipsia, polyphagia and polyuria.   Genitourinary:  Negative for dysuria, flank pain, hematuria and urgency.   Musculoskeletal:  Negative for arthralgias, gait problem, myalgias and neck stiffness.   Skin:  Negative for color change and rash.   Allergic/Immunologic: Negative for environmental allergies and food allergies.   Neurological:  Negative for dizziness, seizures, syncope, speech difficulty and headaches.   Hematological:  Negative for adenopathy.   Psychiatric/Behavioral:  Negative for agitation, confusion, decreased concentration, dysphoric mood and sleep disturbance. The patient is not nervous/anxious.        Objective   LMP  (LMP Unknown)     Physical Exam  Neurological:      Mental Status: She is alert and  oriented to person, place, and time.   Psychiatric:         Mood and Affect: Mood normal.         Thought Content: Thought content normal.         Judgment: Judgment normal.       Constitutional: Well developed, well nourished, alert and in no acute distress   Psychiatric: Mood calm and affect normal    Telephone Visit - Audio Communication Only      Assessment/Plan   Problem List Items Addressed This Visit             ICD-10-CM    Herpes simplex B00.9    Relevant Medications    valACYclovir (Valtrex) 1 gram tablet    Dyslipidemia E78.5    Type 2 diabetes mellitus without complication, without long-term current use of insulin (CMS/[facility]) - Primary E11.9    Relevant Medications    liraglutide (Victoza) 0.6 mg/0.1 mL (18 mg/3 mL) injection     Other Visit Diagnoses         Codes    Polycythemia     D75.1    Relevant Orders    Referral to Hematology and Oncology              Scribe Attestation  By signing my name below, IAna RMA , Scribe   attest that this documentation has been prepared under the direction and in the presence of Mike Pierce DO.   Provider Attestation - Scribe documentation    All medical record entries made by the Scribe were at my direction and personally dictated by me. I have reviewed the chart and agree that the record accurately reflects my personal performance of the history, physical exam, discussion and plan.

## 2024-01-19 NOTE — PATIENT INSTRUCTIONS
Follow up in 3 months    Continue current medications and therapy for chronic medical conditions.    Patient was advised importance of proper diet/nutrition in addition adequate hydration. Patient was encouraged moderate exercise program to include 30 minutes daily for 5 days of the week or 150 minutes weekly. Patient will follow-up with us as scheduled.    Review lab results from January 2024    Referred to hematology/

## 2024-01-30 DIAGNOSIS — Z00.00 ENCOUNTER FOR GENERAL ADULT MEDICAL EXAMINATION WITHOUT ABNORMAL FINDINGS: ICD-10-CM

## 2024-02-01 RX ORDER — ESTERIFIED ESTROGEN AND METHYLTESTOSTERONE .625; 1.25 MG/1; MG/1
TABLET ORAL
Qty: 30 TABLET | Refills: 2 | Status: SHIPPED | OUTPATIENT
Start: 2024-02-01 | End: 2024-05-17 | Stop reason: SDUPTHER

## 2024-02-07 DIAGNOSIS — E11.9 TYPE 2 DIABETES MELLITUS WITHOUT COMPLICATION, WITHOUT LONG-TERM CURRENT USE OF INSULIN (MULTI): ICD-10-CM

## 2024-02-08 DIAGNOSIS — E11.9 TYPE 2 DIABETES MELLITUS WITHOUT COMPLICATION, WITHOUT LONG-TERM CURRENT USE OF INSULIN (MULTI): ICD-10-CM

## 2024-02-08 RX ORDER — LIRAGLUTIDE 6 MG/ML
1.8 INJECTION SUBCUTANEOUS DAILY
Qty: 9 EACH | Refills: 0 | Status: SHIPPED | OUTPATIENT
Start: 2024-02-08 | End: 2024-05-17 | Stop reason: WASHOUT

## 2024-02-08 NOTE — TELEPHONE ENCOUNTER
Dr Pierce pt    Pt phoned office and is requesting refill on victoza. Pt would like a 3 month supply.      [facility]

## 2024-02-19 ENCOUNTER — TELEPHONE (OUTPATIENT)
Dept: PRIMARY CARE | Facility: CLINIC | Age: 49
End: 2024-02-19

## 2024-02-19 NOTE — TELEPHONE ENCOUNTER
Pt is looking for information and orders re: having red blood cells removed.     Pt also wants LOVE to be aware that she is having headaches.     Please advise and call pt.

## 2024-02-27 ENCOUNTER — TELEMEDICINE (OUTPATIENT)
Dept: PHARMACY | Facility: HOSPITAL | Age: 49
End: 2024-02-27
Payer: COMMERCIAL

## 2024-02-27 DIAGNOSIS — E11.9 TYPE 2 DIABETES MELLITUS WITHOUT COMPLICATION, WITHOUT LONG-TERM CURRENT USE OF INSULIN (MULTI): Primary | ICD-10-CM

## 2024-02-27 ASSESSMENT — ENCOUNTER SYMPTOMS: DIABETIC ASSOCIATED SYMPTOMS: 0

## 2024-02-27 NOTE — ASSESSMENT & PLAN NOTE
Patients diabetes is uncontrolled as evidenced by the most recent A1c of 7.5% on 1/15/2024.   Patient states she has been on many medications for her blood sugars, but Jardiance and Victoza are what work the best for the patient.  Ozempic, Trulicity, and Mounjaro all cause headache, low energy, and she did not feel right on these medications.  PCP increased Victoza to 1.8mg daily at last PCP visit.  Patient does not usually test her blood sugars at home, but does have testing supplies. If patient states to get a headache, she typically will check her blood sugar and it is typically high, 150-160mg/dL. Encouraged the patient to check blood sugars 2-3 times per week in the morning, before food.  Discussed with the patient that her LDL has been high the past few lab draws, and a statin should be started, especially for secondary prevention with diabetes. Patient states she is aware and her and Dr. Pierce are keeping an eye on it, however she would not like to start any additional medications at this time.  Continue working towards healthy diet and lifestyle  Discussed maintaining a healthy diet and watching carbohydrate count at each meal. 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 Patient states if she goes through a drive thru for a burger, she will only have one bun. Patient is making conscious choices to help decrease her blood sugars.

## 2024-02-27 NOTE — TELEPHONE ENCOUNTER
Called pt to advise dr mason to donate plasma, donate blood, or if she wanted to schedule with infusion center to call us so we can put the referral in.

## 2024-02-27 NOTE — PROGRESS NOTES
"Clinical Pharmacy Appointment    Subjective   Patient ID: Darcy Kim \"Darcy Kim\" is a 48 y.o. female who presents for Diabetes.    Referring Provider: Mike Pierce, DO     Diabetes  She presents for her initial diabetic visit. She has type 2 diabetes mellitus. Her disease course has been stable. There are no hypoglycemic associated symptoms. There are no diabetic associated symptoms. There are no hypoglycemic complications. Risk factors for coronary artery disease include family history, dyslipidemia and diabetes mellitus. Current diabetic treatments: Jardiance 25mg Take 1 tablet by mouth daily, Victoza 1.8mg under the skin daily. She is compliant with treatment all of the time. (Patient does not regularly check blood sugar at home.) She does not see a podiatrist.Eye exam is current.     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    Polycythemia  Patient states PCP discussed this diagnosis with the patient last visit but she had more questions that did not get answered.  [facility] discussed with the PCP and Dr. Pierce states that she will get her questions answered at her hematology appointment, as they will take care of everything from here on out.      Objective " "    Labs  Lab Results   Component Value Date    BILITOT 0.3 01/15/2024    CALCIUM 8.9 01/15/2024    CO2 24 01/15/2024     01/15/2024    CREATININE 0.56 01/15/2024    GLUCOSE 129 (H) 01/15/2024    ALKPHOS 92 01/15/2024    K 3.9 01/15/2024    PROT 6.8 01/15/2024     01/15/2024    AST 14 01/15/2024    ALT 19 01/15/2024    BUN 12 01/15/2024    ANIONGAP 13 01/15/2024    ALBUMIN 4.1 01/15/2024    GFRF >90 09/29/2022     Lab Results   Component Value Date    TRIG 82 01/15/2024    CHOL 181 01/15/2024    LDLCALC 125 (H) 01/15/2024    HDL 39.9 01/15/2024     Lab Results   Component Value Date    HGBA1C 7.5 (H) 01/15/2024     Current Outpatient Medications on File Prior to Visit   Medication Sig Dispense Refill    BD Ultra-Fine Short Pen Needle 31 gauge x 5/16\" needle Use daily with Victoza injections 100 each 3    diclofenac sodium (Voltaren) 1 % gel gel Apply 1 Application topically 4 times a day. 100 g 1    empagliflozin (Jardiance) 25 mg Take 1 tablet (25 mg) by mouth once daily. 90 tablet 1    estrogens-methyltestosterone (Estratest HS) 0.625-1.25 mg tablet TAKE 1 TABLET BY MOUTH EVERY DAY 30 tablet 2    fluconazole (Diflucan) 150 mg tablet Take one tablet weekly until finished 3 tablet 0    furosemide (Lasix) 20 mg tablet TAKE 1 TABLET BY MOUTH EVERY DAY 90 tablet 1    ibuprofen 800 mg tablet TAKE 1 TABLET BY MOUTH THREE TIMES A DAY AS NEEDED 270 tablet 0    liraglutide (Victoza 3-Elias) 0.6 mg/0.1 mL (18 mg/3 mL) injection Inject 0.3 mL (1.8 mg) under the skin once daily. 9 each 0    multivitamin (MULTIPLE VITAMINS ORAL) Take 1 tablet by mouth.      valACYclovir (Valtrex) 1 gram tablet Take 1 tablet (1,000 mg) by mouth 2 times a day as needed (for cold sores). 20 tablet 1     No current facility-administered medications on file prior to visit.      Assessment/Plan   Problem List Items Addressed This Visit             ICD-10-CM    Type 2 diabetes mellitus without complication, without long-term current use of " insulin (CMS/[facility]) - Primary E11.9     Patients diabetes is uncontrolled as evidenced by the most recent A1c of 7.5% on 1/15/2024.   Patient states she has been on many medications for her blood sugars, but Jardiance and Victoza are what work the best for the patient.  Ozempic, Trulicity, and Mounjaro all cause headache, low energy, and she did not feel right on these medications.  PCP increased Victoza to 1.8mg daily at last PCP visit.  Patient does not usually test her blood sugars at home, but does have testing supplies. If patient states to get a headache, she typically will check her blood sugar and it is typically high, 150-160mg/dL. Encouraged the patient to check blood sugars 2-3 times per week in the morning, before food.  Discussed with the patient that her LDL has been high the past few lab draws, and a statin should be started, especially for secondary prevention with diabetes. Patient states she is aware and her and Dr. Pierce are keeping an eye on it, however she would not like to start any additional medications at this time.  Continue working towards healthy diet and lifestyle  Discussed maintaining a healthy diet and watching carbohydrate count at each meal. 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 Patient states if she goes through a drive thru for a burger, she will only have one bun. Patient is making conscious choices to help decrease her blood sugars.          Follow up with Clinical Pharmacy Team as needed by the patient or the PCP.    Continue all meds under the continuation of care with the referring provider and clinical pharmacy team.    Please reach out to the Clinical Pharmacy Team if there are any further questions.     Verbal consent to manage patient's drug therapy was obtained from patient. They were informed they may decline to participate or withdraw from participation in pharmacy services at any  time.    Diana Veloz, PharmD  [phone]

## 2024-02-28 PROBLEM — D75.1 POLYCYTHEMIA: Status: ACTIVE | Noted: 2024-02-28

## 2024-03-03 DIAGNOSIS — D75.1 POLYCYTHEMIA: Primary | ICD-10-CM

## 2024-03-04 ENCOUNTER — LAB (OUTPATIENT)
Dept: LAB | Facility: CLINIC | Age: 49
End: 2024-03-04
Payer: COMMERCIAL

## 2024-03-04 ENCOUNTER — OFFICE VISIT (OUTPATIENT)
Dept: HEMATOLOGY/ONCOLOGY | Facility: CLINIC | Age: 49
End: 2024-03-04
Payer: COMMERCIAL

## 2024-03-04 VITALS
RESPIRATION RATE: 16 BRPM | DIASTOLIC BLOOD PRESSURE: 80 MMHG | WEIGHT: 153.22 LBS | BODY MASS INDEX: 28.93 KG/M2 | TEMPERATURE: 99 F | OXYGEN SATURATION: 98 % | HEIGHT: 61 IN | HEART RATE: 122 BPM | SYSTOLIC BLOOD PRESSURE: 118 MMHG

## 2024-03-04 DIAGNOSIS — I10 PRIMARY HYPERTENSION: ICD-10-CM

## 2024-03-04 DIAGNOSIS — E11.9 TYPE 2 DIABETES MELLITUS WITHOUT COMPLICATION, WITHOUT LONG-TERM CURRENT USE OF INSULIN (MULTI): Primary | ICD-10-CM

## 2024-03-04 DIAGNOSIS — D75.1 POLYCYTHEMIA: ICD-10-CM

## 2024-03-04 LAB
BASOPHILS # BLD AUTO: 0.06 X10*3/UL (ref 0–0.1)
BASOPHILS NFR BLD AUTO: 0.9 %
EOSINOPHIL # BLD AUTO: 0.2 X10*3/UL (ref 0–0.7)
EOSINOPHIL NFR BLD AUTO: 2.9 %
ERYTHROCYTE [DISTWIDTH] IN BLOOD BY AUTOMATED COUNT: 12.4 % (ref 11.5–14.5)
HCT VFR BLD AUTO: 48.3 % (ref 36–46)
HGB BLD-MCNC: 16.2 G/DL (ref 12–16)
IMM GRANULOCYTES # BLD AUTO: 0.01 X10*3/UL (ref 0–0.7)
IMM GRANULOCYTES NFR BLD AUTO: 0.1 % (ref 0–0.9)
LDH SERPL L TO P-CCNC: 154 U/L (ref 84–246)
LYMPHOCYTES # BLD AUTO: 0.87 X10*3/UL (ref 1.2–4.8)
LYMPHOCYTES NFR BLD AUTO: 12.8 %
MCH RBC QN AUTO: 28.9 PG (ref 26–34)
MCHC RBC AUTO-ENTMCNC: 33.5 G/DL (ref 32–36)
MCV RBC AUTO: 86 FL (ref 80–100)
MONOCYTES # BLD AUTO: 0.35 X10*3/UL (ref 0.1–1)
MONOCYTES NFR BLD AUTO: 5.2 %
NEUTROPHILS # BLD AUTO: 5.3 X10*3/UL (ref 1.2–7.7)
NEUTROPHILS NFR BLD AUTO: 78.1 %
PLATELET # BLD AUTO: 295 X10*3/UL (ref 150–450)
RBC # BLD AUTO: 5.61 X10*6/UL (ref 4–5.2)
WBC # BLD AUTO: 6.8 X10*3/UL (ref 4.4–11.3)

## 2024-03-04 PROCEDURE — 81256 HFE GENE: CPT | Mod: 59

## 2024-03-04 PROCEDURE — 3049F LDL-C 100-129 MG/DL: CPT | Performed by: INTERNAL MEDICINE

## 2024-03-04 PROCEDURE — 81450 HL NEO GSAP 5-50DNA/DNA&RNA: CPT

## 2024-03-04 PROCEDURE — 3074F SYST BP LT 130 MM HG: CPT | Performed by: INTERNAL MEDICINE

## 2024-03-04 PROCEDURE — 82668 ASSAY OF ERYTHROPOIETIN: CPT

## 2024-03-04 PROCEDURE — 36415 COLL VENOUS BLD VENIPUNCTURE: CPT

## 2024-03-04 PROCEDURE — 3079F DIAST BP 80-89 MM HG: CPT | Performed by: INTERNAL MEDICINE

## 2024-03-04 PROCEDURE — 83615 LACTATE (LD) (LDH) ENZYME: CPT

## 2024-03-04 PROCEDURE — 1036F TOBACCO NON-USER: CPT | Performed by: INTERNAL MEDICINE

## 2024-03-04 PROCEDURE — 85025 COMPLETE CBC W/AUTO DIFF WBC: CPT

## 2024-03-04 PROCEDURE — 99214 OFFICE O/P EST MOD 30 MIN: CPT | Performed by: INTERNAL MEDICINE

## 2024-03-04 PROCEDURE — 99204 OFFICE O/P NEW MOD 45 MIN: CPT | Performed by: INTERNAL MEDICINE

## 2024-03-04 PROCEDURE — 3051F HG A1C>EQUAL 7.0%<8.0%: CPT | Performed by: INTERNAL MEDICINE

## 2024-03-04 PROCEDURE — G0452 MOLECULAR PATHOLOGY INTERPR: HCPCS | Performed by: PATHOLOGY

## 2024-03-04 ASSESSMENT — PAIN SCALES - GENERAL: PAINLEVEL: 4

## 2024-03-04 NOTE — PROGRESS NOTES
"Patient ID: Darcy Kim is a 48 y.o. female.  Referring Physician: Mike Pierce DO  [address] [place] [address]  Primary Care Provider: Mike Pierce DO  Visit Type: Initial Visit      Subjective    HPI  My red blood cell count has been high    Review of Systems   Constitutional: Negative.    HENT:  Negative.     Eyes: Negative.    Respiratory: Negative.     Cardiovascular: Negative.    Gastrointestinal: Negative.    Endocrine: Negative.    Genitourinary: Negative.     Musculoskeletal: Negative.    Skin: Negative.    Neurological: Negative.    Hematological: Negative.    Psychiatric/Behavioral: Negative.          Objective   BSA: 1.74 meters squared  /80 (BP Location: Right arm)   Pulse (!) 122   Temp 37.2 °C (99 °F) (Temporal)   Resp 16   Ht (S) 1.562 m (5' 1.5\")   Wt 69.5 kg (153 lb 3.5 oz)   LMP  (LMP Unknown)   SpO2 98%   BMI 28.49 kg/m²      has a past medical history of Acquired absence of both cervix and uterus, Anosmia (12/01/2021), Candidiasis, unspecified (10/06/2020), COVID-19 (01/13/2022), Diabetes mellitus (CMS/[facility]) (2015), Encounter for other screening for malignant neoplasm of breast (11/10/2021), Encounter for screening for malignant neoplasm of colon (11/10/2021), Encounter for screening for other suspected endocrine disorder (01/03/2020), Headache (5/27/23), Other specified postprocedural states (09/12/2016), Pain in unspecified elbow (04/09/2020), Pain in unspecified knee (04/12/2022), Pain in unspecified shoulder (07/06/2020), Parageusia (12/01/2021), Personal history of gestational diabetes, Personal history of other benign neoplasm, Personal history of other diseases of the female genital tract, Personal history of other diseases of the musculoskeletal system and connective tissue (01/15/2021), Personal history of other diseases of the musculoskeletal system and connective tissue (10/06/2020), Personal history of other diseases of the musculoskeletal " "system and connective tissue (10/06/2020), Personal history of other diseases of the respiratory system (09/21/2021), Personal history of other diseases of the respiratory system (05/14/2021), Personal history of other diseases of urinary system (07/28/2016), Personal history of other endocrine, nutritional and metabolic disease, Personal history of other medical treatment (01/15/2021), Personal history of other specified conditions (11/10/2021), Post covid-19 condition, unspecified (11/10/2021), Strain of unspecified muscle(s) and tendon(s) at lower leg level, left leg, initial encounter (03/09/2021), Unspecified abdominal pain (07/28/2016), and Unspecified symptoms and signs involving the genitourinary system (09/12/2020).   has a past surgical history that includes Hemorrhoid surgery (06/09/2016); Cholecystectomy (06/09/2016); Colonoscopy (06/09/2016); Total vaginal hysterectomy (04/09/2020); and Other surgical history (06/07/2019).  Family History   Problem Relation Name Age of Onset    Diabetes Mother Taty     Heart disease Maternal Grandfather Pierre     Hypertension Maternal Grandfather Pierre     Cancer Maternal Grandmother Gilda     Diabetes Maternal Grandmother Gilda     Diabetes Paternal Grandfather Bill      Oncology History    No history exists.       Darcy Kim \"Darcy Kim\"  reports that she quit smoking about 10 years ago. Her smoking use included cigarettes. She has a 15.00 pack-year smoking history. She has been exposed to tobacco smoke. She has never used smokeless tobacco.  She  reports no history of alcohol use.  She  reports no history of drug use.    Physical Exam  Vitals reviewed.   Constitutional:       Appearance: Normal appearance.   HENT:      Head: Normocephalic.      Mouth/Throat:      Mouth: Mucous membranes are moist.   Eyes:      Extraocular Movements: Extraocular movements intact.      Pupils: Pupils are equal, round, and reactive to light.   Cardiovascular:      Rate and Rhythm: " Normal rate and regular rhythm.      Heart sounds: Normal heart sounds.   Pulmonary:      Breath sounds: Normal breath sounds.   Abdominal:      General: Bowel sounds are normal.      Palpations: Abdomen is soft.   Musculoskeletal:         General: Normal range of motion.      Cervical back: Normal range of motion and neck supple.   Skin:     General: Skin is warm.   Neurological:      General: No focal deficit present.      Mental Status: She is alert and oriented to person, place, and time.   Psychiatric:         Mood and Affect: Mood normal.         Behavior: Behavior normal.         WBC   Date/Time Value Ref Range Status   01/15/2024 07:51 AM 5.9 4.4 - 11.3 x10*3/uL Final   11/13/2023 07:41 AM 5.5 4.4 - 11.3 x10*3/uL Final   09/29/2022 09:01 AM 5.7 4.4 - 11.3 x10E9/L Final   10/06/2020 09:02 AM 5.9 4.4 - 11.3 x10E9/L Final   06/07/2019 10:10 AM 6.2 4.4 - 11.3 x10E9/L Final     nRBC   Date Value Ref Range Status   01/15/2024 0.0 0.0 - 0.0 /100 WBCs Final   11/13/2023 0.0 0.0 - 0.0 /100 WBCs Final   06/07/2019 0.0 0.0 - 0.0 /100 WBC Final     RBC   Date Value Ref Range Status   01/15/2024 5.70 (H) 4.00 - 5.20 x10*6/uL Final   11/13/2023 5.77 (H) 4.00 - 5.20 x10*6/uL Final   09/29/2022 5.51 (H) 4.00 - 5.20 x10E12/L Final   10/06/2020 4.94 4.00 - 5.20 x10E12/L Final   06/07/2019 5.37 (H) 4.00 - 5.20 x10E12/L Final     Hemoglobin   Date Value Ref Range Status   01/15/2024 16.5 (H) 12.0 - 16.0 g/dL Final   11/13/2023 16.6 (H) 12.0 - 16.0 g/dL Final   09/29/2022 15.5 12.0 - 16.0 g/dL Final   10/06/2020 14.1 12.0 - 16.0 g/dL Final   06/07/2019 15.3 12.0 - 16.0 g/dL Final     Hematocrit   Date Value Ref Range Status   01/15/2024 49.7 (H) 36.0 - 46.0 % Final   11/13/2023 50.2 (H) 36.0 - 46.0 % Final   09/29/2022 47.8 (H) 36.0 - 46.0 % Final   10/06/2020 43.7 36.0 - 46.0 % Final   06/07/2019 48.2 (H) 36.0 - 46.0 % Final     MCV   Date/Time Value Ref Range Status   01/15/2024 07:51 AM 87 80 - 100 fL Final   11/13/2023 07:41  "AM 87 80 - 100 fL Final   09/29/2022 09:01 AM 87 80 - 100 fL Final   10/06/2020 09:02 AM 88 80 - 100 fL Final   06/07/2019 10:10 AM 90 80 - 100 fL Final     MCH   Date/Time Value Ref Range Status   01/15/2024 07:51 AM 28.9 26.0 - 34.0 pg Final   11/13/2023 07:41 AM 28.8 26.0 - 34.0 pg Final     MCHC   Date/Time Value Ref Range Status   01/15/2024 07:51 AM 33.2 32.0 - 36.0 g/dL Final   11/13/2023 07:41 AM 33.1 32.0 - 36.0 g/dL Final   09/29/2022 09:01 AM 32.4 32.0 - 36.0 g/dL Final   10/06/2020 09:02 AM 32.3 32.0 - 36.0 g/dL Final   06/07/2019 10:10 AM 31.7 (L) 32.0 - 36.0 g/dL Final     RDW   Date/Time Value Ref Range Status   01/15/2024 07:51 AM 12.7 11.5 - 14.5 % Final   11/13/2023 07:41 AM 12.6 11.5 - 14.5 % Final   09/29/2022 09:01 AM 12.3 11.5 - 14.5 % Final   10/06/2020 09:02 AM 12.7 11.5 - 14.5 % Final   06/07/2019 10:10 AM 12.3 11.5 - 14.5 % Final     Platelets   Date/Time Value Ref Range Status   01/15/2024 07:51  150 - 450 x10*3/uL Final   11/13/2023 07:41  150 - 450 x10*3/uL Final   09/29/2022 09:01  150 - 450 x10E9/L Final   10/06/2020 09:02  150 - 450 x10E9/L Final   06/07/2019 10:10  150 - 450 x10E9/L Final     No results found for: \"MPV\"  Neutrophils %   Date/Time Value Ref Range Status   01/15/2024 07:51 AM 66.5 40.0 - 80.0 % Final   11/13/2023 07:41 AM 67.6 40.0 - 80.0 % Final   10/06/2020 09:02 AM 74.8 40.0 - 80.0 % Final   06/07/2019 10:10 AM 69.9 40.0 - 80.0 % Final     Immature Granulocytes %, Automated   Date/Time Value Ref Range Status   01/15/2024 07:51 AM 0.3 0.0 - 0.9 % Final     Comment:     Immature Granulocyte Count (IG) includes promyelocytes, myelocytes and metamyelocytes but does not include bands. Percent differential counts (%) should be interpreted in the context of the absolute cell counts (cells/UL).   11/13/2023 07:41 AM 0.4 0.0 - 0.9 % Final     Comment:     Immature Granulocyte Count (IG) includes promyelocytes, myelocytes and metamyelocytes but " does not include bands. Percent differential counts (%) should be interpreted in the context of the absolute cell counts (cells/UL).   10/06/2020 09:02 AM 0.5 0.0 - 0.9 % Final     Comment:      Immature Granulocyte Count (IG) includes promyelocytes,    myelocytes and metamyelocytes but does not include bands.   Percent differential counts (%) should be interpreted in the   context of the absolute cell counts (cells/L).     06/07/2019 10:10 AM 0.5 0.0 - 0.9 % Final     Comment:      Percent differential counts (%) should be interpreted in the   context of the absolute cell counts (cells/L).       Lymphocytes %   Date/Time Value Ref Range Status   01/15/2024 07:51 AM 22.8 13.0 - 44.0 % Final   11/13/2023 07:41 AM 20.6 13.0 - 44.0 % Final   10/06/2020 09:02 AM 15.0 13.0 - 44.0 % Final   06/07/2019 10:10 AM 18.3 13.0 - 44.0 % Final     Monocytes %   Date/Time Value Ref Range Status   01/15/2024 07:51 AM 6.6 2.0 - 10.0 % Final   11/13/2023 07:41 AM 7.8 2.0 - 10.0 % Final   10/06/2020 09:02 AM 5.6 2.0 - 10.0 % Final   06/07/2019 10:10 AM 7.6 2.0 - 10.0 % Final     Eosinophils %   Date/Time Value Ref Range Status   01/15/2024 07:51 AM 3.0 0.0 - 6.0 % Final   11/13/2023 07:41 AM 3.1 0.0 - 6.0 % Final   10/06/2020 09:02 AM 3.6 0.0 - 6.0 % Final   06/07/2019 10:10 AM 3.1 0.0 - 6.0 % Final     Basophils %   Date/Time Value Ref Range Status   01/15/2024 07:51 AM 0.8 0.0 - 2.0 % Final   11/13/2023 07:41 AM 0.5 0.0 - 2.0 % Final   10/06/2020 09:02 AM 0.5 0.0 - 2.0 % Final   06/07/2019 10:10 AM 0.6 0.0 - 2.0 % Final     Neutrophils Absolute   Date/Time Value Ref Range Status   01/15/2024 07:51 AM 3.94 1.20 - 7.70 x10*3/uL Final     Comment:     Percent differential counts (%) should be interpreted in the context of the absolute cell counts (cells/uL).   11/13/2023 07:41 AM 3.74 1.20 - 7.70 x10*3/uL Final     Comment:     Percent differential counts (%) should be interpreted in the context of the absolute cell counts (cells/uL).  "  10/06/2020 09:02 AM 4.38 1.20 - 7.70 x10E9/L Final   06/07/2019 10:10 AM 4.30 1.20 - 7.70 x10E9/L Final     Immature Granulocytes Absolute, Automated   Date/Time Value Ref Range Status   01/15/2024 07:51 AM 0.02 0.00 - 0.70 x10*3/uL Final   11/13/2023 07:41 AM 0.02 0.00 - 0.70 x10*3/uL Final     Lymphocytes Absolute   Date/Time Value Ref Range Status   01/15/2024 07:51 AM 1.35 1.20 - 4.80 x10*3/uL Final   11/13/2023 07:41 AM 1.14 (L) 1.20 - 4.80 x10*3/uL Final   10/06/2020 09:02 AM 0.88 (L) 1.20 - 4.80 x10E9/L Final   06/07/2019 10:10 AM 1.13 (L) 1.20 - 4.80 x10E9/L Final     Monocytes Absolute   Date/Time Value Ref Range Status   01/15/2024 07:51 AM 0.39 0.10 - 1.00 x10*3/uL Final   11/13/2023 07:41 AM 0.43 0.10 - 1.00 x10*3/uL Final   10/06/2020 09:02 AM 0.33 0.10 - 1.00 x10E9/L Final   06/07/2019 10:10 AM 0.47 0.10 - 1.00 x10E9/L Final     Eosinophils Absolute   Date/Time Value Ref Range Status   01/15/2024 07:51 AM 0.18 0.00 - 0.70 x10*3/uL Final   11/13/2023 07:41 AM 0.17 0.00 - 0.70 x10*3/uL Final   10/06/2020 09:02 AM 0.21 0.00 - 0.70 x10E9/L Final   06/07/2019 10:10 AM 0.19 0.00 - 0.70 x10E9/L Final     Basophils Absolute   Date/Time Value Ref Range Status   01/15/2024 07:51 AM 0.05 0.00 - 0.10 x10*3/uL Final   11/13/2023 07:41 AM 0.03 0.00 - 0.10 x10*3/uL Final   10/06/2020 09:02 AM 0.03 0.00 - 0.10 x10E9/L Final   06/07/2019 10:10 AM 0.04 0.00 - 0.10 x10E9/L Final       No components found for: \"PT\"  No results found for: \"APTT\"  Medication Documentation Review Audit       Reviewed by Alis Bermeo MA (Medical Assistant) on 03/04/24 at 1500      Medication Order Taking? Sig Documenting Provider Last Dose Status   BD Ultra-Fine Short Pen Needle 31 gauge x 5/16\" needle [id number] Yes Use daily with Victoza injections Mike Pierce, DO Taking Active   diclofenac sodium (Voltaren) 1 % gel gel [id number] No Apply 1 Application topically 4 times a day.   Patient not taking: Reported on 3/4/2024    Mike TANG" Stan, DO Not Taking Active   empagliflozin (Jardiance) 25 mg [id number] Yes Take 1 tablet (25 mg) by mouth once daily. Mike Pierce, DO Taking Active   estrogens-methyltestosterone (Estratest HS) 0.625-1.25 mg tablet [id number] Yes TAKE 1 TABLET BY MOUTH EVERY DAY Mike Pierce, DO Taking Active   fluconazole (Diflucan) 150 mg tablet [id number] No Take one tablet weekly until finished   Patient not taking: Reported on 3/4/2024    Mike Pierce, DO Not Taking Active   furosemide (Lasix) 20 mg tablet [id number] Yes TAKE 1 TABLET BY MOUTH EVERY DAY Mahin Leroy, DO Taking Active   ibuprofen 800 mg tablet [id number] Yes TAKE 1 TABLET BY MOUTH THREE TIMES A DAY AS NEEDED Mike Pierce, DO Taking Active   liraglutide (Victoza 3-Elias) 0.6 mg/0.1 mL (18 mg/3 mL) injection [id number] Yes Inject 0.3 mL (1.8 mg) under the skin once daily. Mike Pierce, DO Taking Active   multivitamin (MULTIPLE VITAMINS ORAL) [id number] Yes Take 1 tablet by mouth. Historical Provider, MD Taking Active   valACYclovir (Valtrex) 1 gram tablet [id number] Yes Take 1 tablet (1,000 mg) by mouth 2 times a day as needed (for cold sores). Mike Pierce, DO Taking Active                   Assessment/Plan    1) polycythemia  -I reviewed her lab history in the EMR  -6/7/2019 wbc 6.2, hgb 15.3, hematocrit 48.2%, plt 276,000, ANC 4300  -10/6/2020 wbc 5.9, hgb 14.1, hematocrit 43.7%, plt 262,000  -9/29/2022 wbc 5.7, hgb 15.5, hematocrit 47.8%, plt 302,000  -11/13/2023 wbc 5.5, hgb 16.6, hematocrit 50.2 %, plt 268,000  -1/15/2024 wbc 5.9, hgb 16.5, hematocrit 49.7%, plt 275,000, ferritin 195,  TIBC 375, sat 19%  -the differential to consider is between hereditary hemochromatosis and myeloproliferative disorder  -will recheck CBC and check HFE mutation analysis  -will check LDH, EPO and myeloid NGS screen--she was advised that a bone marrow biopsy would be necessary only if myeloid NGS screen picked up a mutation  -still another possibility  is that since she has relatively high BMI for her height, and is on a diuretic, 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 (hemoconcentration secondary to diuretics leading to volume contraction)    2) diabetes  -on victoza  -on jardiance    3) hypertension  -on lasix     Problem List Items Addressed This Visit             ICD-10-CM    Polycythemia D75.1    Relevant Orders    Clinic Appointment Request Virtual Est; [facility]; [facility] [id number]            Rai Mcfarlane MD

## 2024-03-06 LAB — EPO SERPL-ACNC: 5 MU/ML (ref 4–27)

## 2024-03-08 LAB
ELECTRONICALLY SIGNED BY: NORMAL
MYELOID NGS RESULTS: NORMAL

## 2024-03-12 LAB
ELECTRONICALLY SIGNED BY: NORMAL
HFE GENE MUT TESTED BLD/T: NORMAL
HFE P.C282Y BLD/T QL: NORMAL
HFE P.H63D BLD/T QL: NORMAL

## 2024-03-17 PROBLEM — I10 PRIMARY HYPERTENSION: Status: ACTIVE | Noted: 2024-03-17

## 2024-03-17 ASSESSMENT — ENCOUNTER SYMPTOMS
ENDOCRINE NEGATIVE: 1
CARDIOVASCULAR NEGATIVE: 1
MUSCULOSKELETAL NEGATIVE: 1
EYES NEGATIVE: 1
NEUROLOGICAL NEGATIVE: 1
PSYCHIATRIC NEGATIVE: 1
HEMATOLOGIC/LYMPHATIC NEGATIVE: 1
GASTROINTESTINAL NEGATIVE: 1
RESPIRATORY NEGATIVE: 1
CONSTITUTIONAL NEGATIVE: 1

## 2024-03-18 ENCOUNTER — TELEMEDICINE (OUTPATIENT)
Dept: HEMATOLOGY/ONCOLOGY | Facility: CLINIC | Age: 49
End: 2024-03-18
Payer: COMMERCIAL

## 2024-03-18 ENCOUNTER — APPOINTMENT (OUTPATIENT)
Dept: HEMATOLOGY/ONCOLOGY | Facility: CLINIC | Age: 49
End: 2024-03-18
Payer: COMMERCIAL

## 2024-03-18 DIAGNOSIS — D75.1 POLYCYTHEMIA: Primary | ICD-10-CM

## 2024-03-18 DIAGNOSIS — D75.1: ICD-10-CM

## 2024-03-18 DIAGNOSIS — I10 PRIMARY HYPERTENSION: ICD-10-CM

## 2024-03-18 DIAGNOSIS — E11.9 TYPE 2 DIABETES MELLITUS WITHOUT COMPLICATION, WITHOUT LONG-TERM CURRENT USE OF INSULIN (MULTI): ICD-10-CM

## 2024-03-18 PROCEDURE — 3051F HG A1C>EQUAL 7.0%<8.0%: CPT | Performed by: INTERNAL MEDICINE

## 2024-03-18 PROCEDURE — 99442 PR PHYS/QHP TELEPHONE EVALUATION 11-20 MIN: CPT | Performed by: INTERNAL MEDICINE

## 2024-03-18 PROCEDURE — 3049F LDL-C 100-129 MG/DL: CPT | Performed by: INTERNAL MEDICINE

## 2024-03-18 PROCEDURE — 1036F TOBACCO NON-USER: CPT | Performed by: INTERNAL MEDICINE

## 2024-03-18 NOTE — PATIENT INSTRUCTIONS
Since you don't take the lasix regularly, most likely your elevated hemoglobin is secondary to volume contraction (hemoconcentration) induced by the jardiance and the victoza    See you again in 1 year

## 2024-03-18 NOTE — PROGRESS NOTES
Patient ID: Darcy Kim is a 48 y.o. female.  Referring Physician: No referring provider defined for this encounter.  Primary Care Provider: Mike Pierce DO  Visit Type:  Follow Up     Verbal consent was requested and obtained from patient on this date for a telehealth visit.    Subjective    HPI What did my bloodwork show?    Review of Systems   Constitutional: Negative.    HENT:  Negative.     Eyes: Negative.    Respiratory: Negative.     Cardiovascular: Negative.    Gastrointestinal: Negative.    Endocrine: Negative.    Genitourinary: Negative.     Musculoskeletal: Negative.    Skin: Negative.    Neurological: Negative.    Hematological: Negative.    Psychiatric/Behavioral: Negative.          Objective   BSA: There is no height or weight on file to calculate BSA.  LMP  (LMP Unknown)      has a past medical history of Acquired absence of both cervix and uterus, Anosmia (12/01/2021), Candidiasis, unspecified (10/06/2020), COVID-19 (01/13/2022), Diabetes mellitus (CMS/Formerly [facility]) (2015), Encounter for other screening for malignant neoplasm of breast (11/10/2021), Encounter for screening for malignant neoplasm of colon (11/10/2021), Encounter for screening for other suspected endocrine disorder (01/03/2020), Headache (5/27/23), Other specified postprocedural states (09/12/2016), Pain in unspecified elbow (04/09/2020), Pain in unspecified knee (04/12/2022), Pain in unspecified shoulder (07/06/2020), Parageusia (12/01/2021), Personal history of gestational diabetes, Personal history of other benign neoplasm, Personal history of other diseases of the female genital tract, Personal history of other diseases of the musculoskeletal system and connective tissue (01/15/2021), Personal history of other diseases of the musculoskeletal system and connective tissue (10/06/2020), Personal history of other diseases of the musculoskeletal system and connective tissue (10/06/2020), Personal history of other diseases of the  "respiratory system (09/21/2021), Personal history of other diseases of the respiratory system (05/14/2021), Personal history of other diseases of urinary system (07/28/2016), Personal history of other endocrine, nutritional and metabolic disease, Personal history of other medical treatment (01/15/2021), Personal history of other specified conditions (11/10/2021), Post covid-19 condition, unspecified (11/10/2021), Strain of unspecified muscle(s) and tendon(s) at lower leg level, left leg, initial encounter (03/09/2021), Unspecified abdominal pain (07/28/2016), and Unspecified symptoms and signs involving the genitourinary system (09/12/2020).   has a past surgical history that includes Hemorrhoid surgery (06/09/2016); Cholecystectomy (06/09/2016); Colonoscopy (06/09/2016); Total vaginal hysterectomy (04/09/2020); and Other surgical history (06/07/2019).  Family History   Problem Relation Name Age of Onset    Diabetes Mother Taty     Heart disease Maternal Grandfather Pierre     Hypertension Maternal Grandfather Pierre     Cancer Maternal Grandmother Gilda     Diabetes Maternal Grandmother Gilda     Diabetes Paternal Grandfather Bill      Oncology History    No history exists.       Darcy Kim \"Darcy Kim\"  reports that she quit smoking about 10 years ago. Her smoking use included cigarettes. She has a 15.00 pack-year smoking history. She has been exposed to tobacco smoke. She has never used smokeless tobacco.  She  reports no history of alcohol use.  She  reports no history of drug use.    Physical Exam    WBC   Date/Time Value Ref Range Status   03/04/2024 03:04 PM 6.8 4.4 - 11.3 x10*3/uL Final   01/15/2024 07:51 AM 5.9 4.4 - 11.3 x10*3/uL Final   11/13/2023 07:41 AM 5.5 4.4 - 11.3 x10*3/uL Final     [facility]   Date Value Ref Range Status   01/15/2024 0.0 0.0 - 0.0 /100 WBCs Final   11/13/2023 0.0 0.0 - 0.0 /100 WBCs Final   06/07/2019 0.0 0.0 - 0.0 /100 WBC Final     RBC   Date Value Ref Range Status   03/04/2024 5.61 " "(H) 4.00 - 5.20 x10*6/uL Final   01/15/2024 5.70 (H) 4.00 - 5.20 x10*6/uL Final   11/13/2023 5.77 (H) 4.00 - 5.20 x10*6/uL Final     Hemoglobin   Date Value Ref Range Status   03/04/2024 16.2 (H) 12.0 - 16.0 g/dL Final   01/15/2024 16.5 (H) 12.0 - 16.0 g/dL Final   11/13/2023 16.6 (H) 12.0 - 16.0 g/dL Final     Hematocrit   Date Value Ref Range Status   03/04/2024 48.3 (H) 36.0 - 46.0 % Final   01/15/2024 49.7 (H) 36.0 - 46.0 % Final   11/13/2023 50.2 (H) 36.0 - 46.0 % Final     MCV   Date/Time Value Ref Range Status   03/04/2024 03:04 PM 86 80 - 100 fL Final   01/15/2024 07:51 AM 87 80 - 100 fL Final   11/13/2023 07:41 AM 87 80 - 100 fL Final     MCH   Date/Time Value Ref Range Status   03/04/2024 03:04 PM 28.9 26.0 - 34.0 pg Final   01/15/2024 07:51 AM 28.9 26.0 - 34.0 pg Final   11/13/2023 07:41 AM 28.8 26.0 - 34.0 pg Final     MCHC   Date/Time Value Ref Range Status   03/04/2024 03:04 PM 33.5 32.0 - 36.0 g/dL Final   01/15/2024 07:51 AM 33.2 32.0 - 36.0 g/dL Final   11/13/2023 07:41 AM 33.1 32.0 - 36.0 g/dL Final     RDW   Date/Time Value Ref Range Status   03/04/2024 03:04 PM 12.4 11.5 - 14.5 % Final   01/15/2024 07:51 AM 12.7 11.5 - 14.5 % Final   11/13/2023 07:41 AM 12.6 11.5 - 14.5 % Final     Platelets   Date/Time Value Ref Range Status   03/04/2024 03:04  150 - 450 x10*3/uL Final   01/15/2024 07:51  150 - 450 x10*3/uL Final   11/13/2023 07:41  150 - 450 x10*3/uL Final     No results found for: \"MPV\"  Neutrophils %   Date/Time Value Ref Range Status   03/04/2024 03:04 PM 78.1 40.0 - 80.0 % Final   01/15/2024 07:51 AM 66.5 40.0 - 80.0 % Final   11/13/2023 07:41 AM 67.6 40.0 - 80.0 % Final     Immature Granulocytes %, Automated   Date/Time Value Ref Range Status   03/04/2024 03:04 PM 0.1 0.0 - 0.9 % Final     Comment:     Immature Granulocyte Count (IG) includes promyelocytes, myelocytes and metamyelocytes but does not include bands. Percent differential counts (%) should be interpreted in " the context of the absolute cell counts (cells/UL).   01/15/2024 07:51 AM 0.3 0.0 - 0.9 % Final     Comment:     Immature Granulocyte Count (IG) includes promyelocytes, myelocytes and metamyelocytes but does not include bands. Percent differential counts (%) should be interpreted in the context of the absolute cell counts (cells/UL).   11/13/2023 07:41 AM 0.4 0.0 - 0.9 % Final     Comment:     Immature Granulocyte Count (IG) includes promyelocytes, myelocytes and metamyelocytes but does not include bands. Percent differential counts (%) should be interpreted in the context of the absolute cell counts (cells/UL).     Lymphocytes %   Date/Time Value Ref Range Status   03/04/2024 03:04 PM 12.8 13.0 - 44.0 % Final   01/15/2024 07:51 AM 22.8 13.0 - 44.0 % Final   11/13/2023 07:41 AM 20.6 13.0 - 44.0 % Final     Monocytes %   Date/Time Value Ref Range Status   03/04/2024 03:04 PM 5.2 2.0 - 10.0 % Final   01/15/2024 07:51 AM 6.6 2.0 - 10.0 % Final   11/13/2023 07:41 AM 7.8 2.0 - 10.0 % Final     Eosinophils %   Date/Time Value Ref Range Status   03/04/2024 03:04 PM 2.9 0.0 - 6.0 % Final   01/15/2024 07:51 AM 3.0 0.0 - 6.0 % Final   11/13/2023 07:41 AM 3.1 0.0 - 6.0 % Final     Basophils %   Date/Time Value Ref Range Status   03/04/2024 03:04 PM 0.9 0.0 - 2.0 % Final   01/15/2024 07:51 AM 0.8 0.0 - 2.0 % Final   11/13/2023 07:41 AM 0.5 0.0 - 2.0 % Final     Neutrophils Absolute   Date/Time Value Ref Range Status   03/04/2024 03:04 PM 5.30 1.20 - 7.70 x10*3/uL Final     Comment:     Percent differential counts (%) should be interpreted in the context of the absolute cell counts (cells/uL).   01/15/2024 07:51 AM 3.94 1.20 - 7.70 x10*3/uL Final     Comment:     Percent differential counts (%) should be interpreted in the context of the absolute cell counts (cells/uL).   11/13/2023 07:41 AM 3.74 1.20 - 7.70 x10*3/uL Final     Comment:     Percent differential counts (%) should be interpreted in the context of the absolute cell  "counts (cells/uL).     Immature Granulocytes Absolute, Automated   Date/Time Value Ref Range Status   03/04/2024 03:04 PM 0.01 0.00 - 0.70 x10*3/uL Final   01/15/2024 07:51 AM 0.02 0.00 - 0.70 x10*3/uL Final   11/13/2023 07:41 AM 0.02 0.00 - 0.70 x10*3/uL Final     Lymphocytes Absolute   Date/Time Value Ref Range Status   03/04/2024 03:04 PM 0.87 (L) 1.20 - 4.80 x10*3/uL Final   01/15/2024 07:51 AM 1.35 1.20 - 4.80 x10*3/uL Final   11/13/2023 07:41 AM 1.14 (L) 1.20 - 4.80 x10*3/uL Final     Monocytes Absolute   Date/Time Value Ref Range Status   03/04/2024 03:04 PM 0.35 0.10 - 1.00 x10*3/uL Final   01/15/2024 07:51 AM 0.39 0.10 - 1.00 x10*3/uL Final   11/13/2023 07:41 AM 0.43 0.10 - 1.00 x10*3/uL Final     Eosinophils Absolute   Date/Time Value Ref Range Status   03/04/2024 03:04 PM 0.20 0.00 - 0.70 x10*3/uL Final   01/15/2024 07:51 AM 0.18 0.00 - 0.70 x10*3/uL Final   11/13/2023 07:41 AM 0.17 0.00 - 0.70 x10*3/uL Final     Basophils Absolute   Date/Time Value Ref Range Status   03/04/2024 03:04 PM 0.06 0.00 - 0.10 x10*3/uL Final   01/15/2024 07:51 AM 0.05 0.00 - 0.10 x10*3/uL Final   11/13/2023 07:41 AM 0.03 0.00 - 0.10 x10*3/uL Final       No components found for: \"PT\"  No results found for: \"APTT\"  Medication Documentation Review Audit       Reviewed by Alis eBrmeo MA (Medical Assistant) on 03/04/24 at 1500      Medication Order Taking? Sig Documenting Provider Last Dose Status   BD Ultra-Fine Short Pen Needle 31 gauge x 5/16\" needle [id number] Yes Use daily with Victoza injections Mike Pierce, DO Taking Active   diclofenac sodium (Voltaren) 1 % gel gel [id number] No Apply 1 Application topically 4 times a day.   Patient not taking: Reported on 3/4/2024    Mike Pierce, DO Not Taking Active   empagliflozin (Jardiance) 25 mg [id number] Yes Take 1 tablet (25 mg) by mouth once daily. Mike Pierce, DO Taking Active   estrogens-methyltestosterone (Estratest HS) 0.625-1.25 mg tablet [id number] Yes TAKE 1 " TABLET BY MOUTH EVERY DAY Mike Pierce, DO Taking Active   fluconazole (Diflucan) 150 mg tablet [id number] No Take one tablet weekly until finished   Patient not taking: Reported on 3/4/2024    Mike Pierce DO Not Taking Active   furosemide (Lasix) 20 mg tablet [id number] Yes TAKE 1 TABLET BY MOUTH EVERY DAY Mahin Leroy, DO Taking Active   ibuprofen 800 mg tablet [id number] Yes TAKE 1 TABLET BY MOUTH THREE TIMES A DAY AS NEEDED Mike Pierce, DO Taking Active   liraglutide (Victoza 3-Elias) 0.6 mg/0.1 mL (18 mg/3 mL) injection [id number] Yes Inject 0.3 mL (1.8 mg) under the skin once daily. Mike Pierce, DO Taking Active   multivitamin (MULTIPLE VITAMINS ORAL) [id number] Yes Take 1 tablet by mouth. Historical Provider, MD Taking Active   valACYclovir (Valtrex) 1 gram tablet [id number] Yes Take 1 tablet (1,000 mg) by mouth 2 times a day as needed (for cold sores). Mike Pierce,  Taking Active                   Assessment/Plan    1) polycythemia  -I reviewed her lab history in the EMR  -6/7/2019 wbc 6.2, hgb 15.3, hematocrit 48.2%, plt 276,000, ANC 4300  -10/6/2020 wbc 5.9, hgb 14.1, hematocrit 43.7%, plt 262,000  -9/29/2022 wbc 5.7, hgb 15.5, hematocrit 47.8%, plt 302,000  -11/13/2023 wbc 5.5, hgb 16.6, hematocrit 50.2 %, plt 268,000  -1/15/2024 wbc 5.9, hgb 16.5, hematocrit 49.7%, plt 275,000, ferritin 195,  TIBC 375, sat 19%  -the differential to consider is between hereditary hemochromatosis and myeloproliferative disorder  -will recheck CBC and check HFE mutation analysis  -will check LDH, EPO and myeloid NGS screen--she was advised that a bone marrow biopsy would be necessary only if myeloid NGS screen picked up a mutation  -still another possibility is that since she has relatively high BMI for her height, and is on a diuretic, 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 (hemoconcentration secondary to diuretics leading to volume contraction)    -here for interval followup via telephone to review  results  -HFE mutation analysis was negative  -myeloid NGS screen was negative--so bone marrow bx not indicated  -she says the lasix was prescribed PRN, and she has not taken any since last fall  -she takes daily victoza and jardiance, both of which are associated with volume contraction, for which she tries to take in as much PO fluids each day as she can  -her slight elevation in hematocrit is likely then secondary to hemoconcentration/volume contraction--aka Gaisbock's syndrome, 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

## 2024-03-24 PROBLEM — D75.1: Status: ACTIVE | Noted: 2024-03-24

## 2024-03-24 ASSESSMENT — ENCOUNTER SYMPTOMS
EYES NEGATIVE: 1
ENDOCRINE NEGATIVE: 1
PSYCHIATRIC NEGATIVE: 1
MUSCULOSKELETAL NEGATIVE: 1
GASTROINTESTINAL NEGATIVE: 1
CONSTITUTIONAL NEGATIVE: 1
NEUROLOGICAL NEGATIVE: 1
HEMATOLOGIC/LYMPHATIC NEGATIVE: 1
CARDIOVASCULAR NEGATIVE: 1
RESPIRATORY NEGATIVE: 1

## 2024-03-25 DIAGNOSIS — M25.562 PAIN IN LEFT KNEE: ICD-10-CM

## 2024-03-25 RX ORDER — IBUPROFEN 800 MG/1
TABLET ORAL
Qty: 270 TABLET | Refills: 0 | Status: SHIPPED | OUTPATIENT
Start: 2024-03-25 | End: 2024-05-17

## 2024-04-02 ENCOUNTER — OFFICE VISIT (OUTPATIENT)
Dept: PRIMARY CARE | Facility: CLINIC | Age: 49
End: 2024-04-02
Payer: COMMERCIAL

## 2024-04-02 VITALS
BODY MASS INDEX: 27.75 KG/M2 | HEIGHT: 63 IN | WEIGHT: 156.6 LBS | HEART RATE: 83 BPM | DIASTOLIC BLOOD PRESSURE: 77 MMHG | SYSTOLIC BLOOD PRESSURE: 118 MMHG | OXYGEN SATURATION: 96 % | RESPIRATION RATE: 16 BRPM | TEMPERATURE: 97.6 F

## 2024-04-02 DIAGNOSIS — K13.29 WHITE PATCHES ON ORAL MUCOSA: ICD-10-CM

## 2024-04-02 DIAGNOSIS — B37.0 ORAL THRUSH: Primary | ICD-10-CM

## 2024-04-02 DIAGNOSIS — B49 DISORDER OF ORAL MUCOSA DUE TO FUNGUS: ICD-10-CM

## 2024-04-02 PROCEDURE — 99213 OFFICE O/P EST LOW 20 MIN: CPT | Performed by: NURSE PRACTITIONER

## 2024-04-02 RX ORDER — FLUCONAZOLE 150 MG/1
150 TABLET ORAL
Qty: 2 TABLET | Refills: 0 | Status: SHIPPED | OUTPATIENT
Start: 2024-04-02 | End: 2024-04-02 | Stop reason: ENTERED-IN-ERROR

## 2024-04-02 RX ORDER — FLUCONAZOLE 150 MG/1
150 TABLET ORAL
Qty: 2 TABLET | Refills: 0 | Status: SHIPPED | OUTPATIENT
Start: 2024-04-02 | End: 2024-05-17 | Stop reason: WASHOUT

## 2024-04-02 RX ORDER — FLUCONAZOLE 150 MG/1
150 TABLET ORAL
Qty: 2 TABLET | Refills: 0 | Status: SHIPPED | OUTPATIENT
Start: 2024-04-02 | End: 2024-04-02 | Stop reason: ALTCHOICE

## 2024-04-02 RX ORDER — NYSTATIN 100000 [USP'U]/ML
500000 SUSPENSION ORAL 4 TIMES DAILY
Qty: 280 ML | Refills: 0 | Status: SHIPPED | OUTPATIENT
Start: 2024-04-02 | End: 2024-04-16

## 2024-04-02 ASSESSMENT — PATIENT HEALTH QUESTIONNAIRE - PHQ9
2. FEELING DOWN, DEPRESSED OR HOPELESS: NOT AT ALL
1. LITTLE INTEREST OR PLEASURE IN DOING THINGS: NOT AT ALL
2. FEELING DOWN, DEPRESSED OR HOPELESS: NOT AT ALL
SUM OF ALL RESPONSES TO PHQ9 QUESTIONS 1 AND 2: 0
1. LITTLE INTEREST OR PLEASURE IN DOING THINGS: NOT AT ALL
SUM OF ALL RESPONSES TO PHQ9 QUESTIONS 1 AND 2: 0

## 2024-04-02 ASSESSMENT — ENCOUNTER SYMPTOMS
DEPRESSION: 0
OCCASIONAL FEELINGS OF UNSTEADINESS: 0
LOSS OF SENSATION IN FEET: 0

## 2024-04-02 NOTE — PROGRESS NOTES
"Subjective   Patient ID: Darcy Kim \"Darcy Kim\" is a 48 y.o. female who presents for is with complaints of white patches on the tongue and back of the throat that are painful and sore.    HPI  Patient is a 48 y.o. female who CONSULTED AT [facility] today. Patient is with complaints of white patches on the tongue and back of the throat that are painful and sore. Patient states that condition started about 3 days ago with appearance of whitish patches on the surface of her tongue. she states that the patches were painful and tender. she states the white patches also appeared on the throat a few days later. she denies bleeding nor purulent discharge from the lesions. she denies fever nor chills. She is diabetic. She has no recent use of antibiotics.     Review of Systems  General: no weight loss, generally healthy, no fatigue  Head:  no headaches / sinus pain, no vertigo, no injury  Eyes: no diplopia, no tearing, no pain,   Ears: no change in hearing, no tinnitus, no bleeding, no vertigo  Mouth: (+) white patches on the tongue and back of the throat that are painful and sore, no dental difficulties, no gingival bleeding, no sore throat, no loss of sense of taste  Nose: no congestion, no  discharge, no bleeding, no obstruction, no loss of sense of smell  Neck: no stiffness, no pain, no tenderness, no masses, no bruit  Pulmonary: no dyspnea, no wheezing, no hemoptysis, no cough  Cardiovascular: no chest pain, no palpitations, no syncope, no orthopnea  Gastrointestinal: no change in appetite, no dysphagia, no abdominal pains, no diarrhea, no emesis, no melena  Genito Urinary: no dysuria, no urinary urgency, no nocturia, no incontinence, no change in nature of urine  Musculoskeletal: no muscle ache, no joint pain, no limitation of range of motion, no paresthesia, no numbness  Constitutional: no fever, no chills, no night sweats    Objective   Physical Exam  General: ambulatory, in no acute " distress  Head: normocephalic, no lesions  Eyes: pink palpebral conjunctiva, anicteric sclerae, PERRLA, EOM's full  Nose: nasal mucosa normal, no nasal discharge, no bleeding, no obstruction  Throat: clear, no exudate, no lesions  Mouth: (+) whitish patches on the surface of tongue and posterior wall of pharynx, the patches are tender, not bleeding and no purulent discharge  Neck: supple, no masses, no bruits  Chest: symmetrical chest expansion, no lagging, no retractions, clear breath sounds, no rales, no wheezes    Assessment/Plan   Problem List Items Addressed This Visit    None  Visit Diagnoses         Codes    Oral thrush    -  Primary B37.0    Relevant Medications    nystatin (Mycostatin) 100,000 unit/mL suspension    fluconazole (Diflucan) 150 mg tablet    Disorder of oral mucosa due to fungus     B49    Relevant Medications    nystatin (Mycostatin) 100,000 unit/mL suspension    fluconazole (Diflucan) 150 mg tablet    White patches on oral mucosa     K13.29    Relevant Medications    nystatin (Mycostatin) 100,000 unit/mL suspension    fluconazole (Diflucan) 150 mg tablet        DISCHARGE SUMMARY:   Patient was seen and examined. Diagnosis, treatment, treatment options, and possible complications of today's illness discussed and explained to patient. Patient to take medication/s associated with this visit. Patient may also take OTC analgesic/antipyretic if needed for pain/fever. Advised to increase oral fluid intake. Advised listerine antiseptic mouthwash use 3 x daily prior to nystatin gargle. Patient educated and advised on importance of oral hygiene.     Advised to come back 10 to 14 days as needed or if with worsening or persistent symptoms. Patient verbalized understanding of plan of care.           SOFYA Rausch-CNP 04/02/24 5:25 PM

## 2024-04-02 NOTE — PATIENT INSTRUCTIONS
DISCHARGE SUMMARY:   Patient was seen and examined. Diagnosis, treatment, treatment options, and possible complications of today's illness discussed and explained to patient. Patient to take medication/s associated with this visit. Patient may also take OTC analgesic/antipyretic if needed for pain/fever. Advised to increase oral fluid intake. Advised listerine antiseptic mouthwash use 3 x daily prior to nystatin gargle. Patient educated and advised on importance of oral hygiene.     Advised to come back 10 to 14 days as needed or if with worsening or persistent symptoms. Patient verbalized understanding of plan of care.

## 2024-04-02 NOTE — PROGRESS NOTES
"Subjective   Patient ID: Darcy Kim is a 48 y.o. female who presents for Oral Swelling.    HPI     Symptoms: tongue swelling , feels dry,  hurts joao swallow and hurts to ntouch the roof of the mouth when swallowing.  Length of symptoms: 2 days ago  OTC: salt water , hydrogen peroxide with no help.  Related information:      Review of Systems    Objective   /77 Comment: auto  Pulse 83   Temp 36.4 °C (97.6 °F)   Resp 16   Ht 1.6 m (5' 3\")   Wt 71 kg (156 lb 9.6 oz)   LMP  (LMP Unknown)   SpO2 96%   BMI 27.74 kg/m²     Physical Exam    Assessment/Plan          "

## 2024-05-08 ENCOUNTER — TELEPHONE (OUTPATIENT)
Dept: PRIMARY CARE | Facility: CLINIC | Age: 49
End: 2024-05-08
Payer: COMMERCIAL

## 2024-05-08 DIAGNOSIS — E11.9 TYPE 2 DIABETES MELLITUS WITHOUT COMPLICATION, WITHOUT LONG-TERM CURRENT USE OF INSULIN (MULTI): ICD-10-CM

## 2024-05-08 DIAGNOSIS — E55.9 VITAMIN D DEFICIENCY: ICD-10-CM

## 2024-05-08 NOTE — TELEPHONE ENCOUNTER
Pt requesting labs be added before her upcoming appt.     Please advise and update pt when she can go get bloodwork,

## 2024-05-13 ENCOUNTER — LAB (OUTPATIENT)
Dept: LAB | Facility: LAB | Age: 49
End: 2024-05-13
Payer: COMMERCIAL

## 2024-05-13 DIAGNOSIS — E11.9 TYPE 2 DIABETES MELLITUS WITHOUT COMPLICATION, WITHOUT LONG-TERM CURRENT USE OF INSULIN (MULTI): ICD-10-CM

## 2024-05-13 DIAGNOSIS — E55.9 VITAMIN D DEFICIENCY: ICD-10-CM

## 2024-05-13 LAB
25(OH)D3 SERPL-MCNC: 27 NG/ML (ref 30–100)
ALBUMIN SERPL BCP-MCNC: 4.1 G/DL (ref 3.4–5)
ALP SERPL-CCNC: 88 U/L (ref 33–110)
ALT SERPL W P-5'-P-CCNC: 24 U/L (ref 7–45)
ANION GAP SERPL CALC-SCNC: 16 MMOL/L (ref 10–20)
AST SERPL W P-5'-P-CCNC: 13 U/L (ref 9–39)
BASOPHILS # BLD AUTO: 0.05 X10*3/UL (ref 0–0.1)
BASOPHILS NFR BLD AUTO: 0.9 %
BILIRUB SERPL-MCNC: 0.6 MG/DL (ref 0–1.2)
BUN SERPL-MCNC: 14 MG/DL (ref 6–23)
CALCIUM SERPL-MCNC: 9.1 MG/DL (ref 8.6–10.6)
CHLORIDE SERPL-SCNC: 103 MMOL/L (ref 98–107)
CHOLEST SERPL-MCNC: 203 MG/DL (ref 0–199)
CHOLESTEROL/HDL RATIO: 5
CO2 SERPL-SCNC: 23 MMOL/L (ref 21–32)
CREAT SERPL-MCNC: 0.68 MG/DL (ref 0.5–1.05)
CREAT UR-MCNC: 61.1 MG/DL (ref 20–320)
EGFRCR SERPLBLD CKD-EPI 2021: >90 ML/MIN/1.73M*2
EOSINOPHIL # BLD AUTO: 0.18 X10*3/UL (ref 0–0.7)
EOSINOPHIL NFR BLD AUTO: 3.2 %
ERYTHROCYTE [DISTWIDTH] IN BLOOD BY AUTOMATED COUNT: 12.4 % (ref 11.5–14.5)
EST. AVERAGE GLUCOSE BLD GHB EST-MCNC: 232 MG/DL
GLUCOSE SERPL-MCNC: 189 MG/DL (ref 74–99)
HBA1C MFR BLD: 9.7 %
HCT VFR BLD AUTO: 50.1 % (ref 36–46)
HDLC SERPL-MCNC: 41 MG/DL
HGB BLD-MCNC: 16.1 G/DL (ref 12–16)
IMM GRANULOCYTES # BLD AUTO: 0.01 X10*3/UL (ref 0–0.7)
IMM GRANULOCYTES NFR BLD AUTO: 0.2 % (ref 0–0.9)
LDLC SERPL CALC-MCNC: 139 MG/DL
LYMPHOCYTES # BLD AUTO: 1.25 X10*3/UL (ref 1.2–4.8)
LYMPHOCYTES NFR BLD AUTO: 22.2 %
MCH RBC QN AUTO: 27.8 PG (ref 26–34)
MCHC RBC AUTO-ENTMCNC: 32.1 G/DL (ref 32–36)
MCV RBC AUTO: 86 FL (ref 80–100)
MICROALBUMIN UR-MCNC: <7 MG/L
MICROALBUMIN/CREAT UR: NORMAL MG/G{CREAT}
MONOCYTES # BLD AUTO: 0.41 X10*3/UL (ref 0.1–1)
MONOCYTES NFR BLD AUTO: 7.3 %
NEUTROPHILS # BLD AUTO: 3.74 X10*3/UL (ref 1.2–7.7)
NEUTROPHILS NFR BLD AUTO: 66.2 %
NON HDL CHOLESTEROL: 162 MG/DL (ref 0–149)
NRBC BLD-RTO: 0 /100 WBCS (ref 0–0)
PLATELET # BLD AUTO: 271 X10*3/UL (ref 150–450)
POTASSIUM SERPL-SCNC: 4.1 MMOL/L (ref 3.5–5.3)
PROT SERPL-MCNC: 6.7 G/DL (ref 6.4–8.2)
RBC # BLD AUTO: 5.8 X10*6/UL (ref 4–5.2)
SODIUM SERPL-SCNC: 138 MMOL/L (ref 136–145)
TRIGL SERPL-MCNC: 114 MG/DL (ref 0–149)
VLDL: 23 MG/DL (ref 0–40)
WBC # BLD AUTO: 5.6 X10*3/UL (ref 4.4–11.3)

## 2024-05-13 PROCEDURE — 83036 HEMOGLOBIN GLYCOSYLATED A1C: CPT

## 2024-05-13 PROCEDURE — 80061 LIPID PANEL: CPT

## 2024-05-13 PROCEDURE — 82306 VITAMIN D 25 HYDROXY: CPT

## 2024-05-13 PROCEDURE — 36415 COLL VENOUS BLD VENIPUNCTURE: CPT

## 2024-05-13 PROCEDURE — 80053 COMPREHEN METABOLIC PANEL: CPT

## 2024-05-13 PROCEDURE — 85025 COMPLETE CBC W/AUTO DIFF WBC: CPT

## 2024-05-13 PROCEDURE — 82570 ASSAY OF URINE CREATININE: CPT

## 2024-05-13 PROCEDURE — 82043 UR ALBUMIN QUANTITATIVE: CPT

## 2024-05-17 ENCOUNTER — LAB (OUTPATIENT)
Dept: LAB | Facility: LAB | Age: 49
End: 2024-05-17
Payer: COMMERCIAL

## 2024-05-17 ENCOUNTER — OFFICE VISIT (OUTPATIENT)
Dept: PRIMARY CARE | Facility: CLINIC | Age: 49
End: 2024-05-17
Payer: COMMERCIAL

## 2024-05-17 VITALS
RESPIRATION RATE: 18 BRPM | HEIGHT: 63 IN | TEMPERATURE: 97 F | WEIGHT: 151 LBS | BODY MASS INDEX: 26.75 KG/M2 | HEART RATE: 81 BPM | DIASTOLIC BLOOD PRESSURE: 70 MMHG | OXYGEN SATURATION: 97 % | SYSTOLIC BLOOD PRESSURE: 112 MMHG

## 2024-05-17 DIAGNOSIS — R53.82 CHRONIC FATIGUE: ICD-10-CM

## 2024-05-17 DIAGNOSIS — Z00.00 ENCOUNTER FOR GENERAL ADULT MEDICAL EXAMINATION WITHOUT ABNORMAL FINDINGS: ICD-10-CM

## 2024-05-17 DIAGNOSIS — M25.50 ARTHRALGIA OF MULTIPLE JOINTS: ICD-10-CM

## 2024-05-17 DIAGNOSIS — E11.9 TYPE 2 DIABETES MELLITUS WITHOUT COMPLICATION, WITHOUT LONG-TERM CURRENT USE OF INSULIN (MULTI): ICD-10-CM

## 2024-05-17 DIAGNOSIS — M25.562 PAIN IN LEFT KNEE: ICD-10-CM

## 2024-05-17 DIAGNOSIS — E11.9 TYPE 2 DIABETES MELLITUS WITHOUT COMPLICATION, WITHOUT LONG-TERM CURRENT USE OF INSULIN (MULTI): Primary | ICD-10-CM

## 2024-05-17 DIAGNOSIS — E11.9 TYPE 2 DIABETES MELLITUS WITHOUT COMPLICATIONS (MULTI): ICD-10-CM

## 2024-05-17 DIAGNOSIS — R06.02 SHORTNESS OF BREATH: ICD-10-CM

## 2024-05-17 DIAGNOSIS — E55.9 VITAMIN D DEFICIENCY: ICD-10-CM

## 2024-05-17 DIAGNOSIS — I10 PRIMARY HYPERTENSION: ICD-10-CM

## 2024-05-17 LAB
ALBUMIN SERPL BCP-MCNC: 4.3 G/DL (ref 3.4–5)
ALP SERPL-CCNC: 93 U/L (ref 33–110)
ALT SERPL W P-5'-P-CCNC: 22 U/L (ref 7–45)
ANION GAP SERPL CALC-SCNC: 12 MMOL/L (ref 10–20)
AST SERPL W P-5'-P-CCNC: 14 U/L (ref 9–39)
B-OH-BUTYR SERPL-SCNC: 0.2 MMOL/L (ref 0.02–0.27)
BASOPHILS # BLD AUTO: 0.07 X10*3/UL (ref 0–0.1)
BASOPHILS NFR BLD AUTO: 1.1 %
BILIRUB SERPL-MCNC: 0.5 MG/DL (ref 0–1.2)
BUN SERPL-MCNC: 13 MG/DL (ref 6–23)
CALCIUM SERPL-MCNC: 9.5 MG/DL (ref 8.6–10.3)
CHLORIDE SERPL-SCNC: 103 MMOL/L (ref 98–107)
CO2 SERPL-SCNC: 27 MMOL/L (ref 21–32)
CREAT SERPL-MCNC: 0.63 MG/DL (ref 0.5–1.05)
CRP SERPL-MCNC: 0.78 MG/DL
D DIMER PPP FEU-MCNC: <215 NG/ML FEU
EGFRCR SERPLBLD CKD-EPI 2021: >90 ML/MIN/1.73M*2
EOSINOPHIL # BLD AUTO: 0.16 X10*3/UL (ref 0–0.7)
EOSINOPHIL NFR BLD AUTO: 2.5 %
ERYTHROCYTE [DISTWIDTH] IN BLOOD BY AUTOMATED COUNT: 12.2 % (ref 11.5–14.5)
ERYTHROCYTE [SEDIMENTATION RATE] IN BLOOD BY WESTERGREN METHOD: 12 MM/H (ref 0–20)
GLUCOSE SERPL-MCNC: 194 MG/DL (ref 74–99)
HCT VFR BLD AUTO: 49.3 % (ref 36–46)
HETEROPH AB SERPLBLD QL IA.RAPID: NEGATIVE
HGB BLD-MCNC: 16.7 G/DL (ref 12–16)
IMM GRANULOCYTES # BLD AUTO: 0.02 X10*3/UL (ref 0–0.7)
IMM GRANULOCYTES NFR BLD AUTO: 0.3 % (ref 0–0.9)
LYMPHOCYTES # BLD AUTO: 1.1 X10*3/UL (ref 1.2–4.8)
LYMPHOCYTES NFR BLD AUTO: 17.4 %
MAGNESIUM SERPL-MCNC: 1.99 MG/DL (ref 1.6–2.4)
MCH RBC QN AUTO: 29.3 PG (ref 26–34)
MCHC RBC AUTO-ENTMCNC: 33.9 G/DL (ref 32–36)
MCV RBC AUTO: 87 FL (ref 80–100)
MONOCYTES # BLD AUTO: 0.35 X10*3/UL (ref 0.1–1)
MONOCYTES NFR BLD AUTO: 5.5 %
NEUTROPHILS # BLD AUTO: 4.61 X10*3/UL (ref 1.2–7.7)
NEUTROPHILS NFR BLD AUTO: 73.2 %
NRBC BLD-RTO: 0 /100 WBCS (ref 0–0)
PLATELET # BLD AUTO: 279 X10*3/UL (ref 150–450)
POC APPEARANCE, URINE: CLEAR
POC BILIRUBIN, URINE: NEGATIVE
POC BLOOD, URINE: NEGATIVE
POC COLOR, URINE: YELLOW
POC FINGERSTICK BLOOD GLUCOSE: 252 MG/DL (ref 70–100)
POC GLUCOSE, URINE: ABNORMAL MG/DL
POC KETONES, URINE: NEGATIVE MG/DL
POC LEUKOCYTES, URINE: NEGATIVE
POC NITRITE,URINE: NEGATIVE
POC PH, URINE: 5.5 PH
POC PROTEIN, URINE: NEGATIVE MG/DL
POC SPECIFIC GRAVITY, URINE: 1.01
POC UROBILINOGEN, URINE: 0.2 EU/DL
POTASSIUM SERPL-SCNC: 4.3 MMOL/L (ref 3.5–5.3)
PROT SERPL-MCNC: 6.7 G/DL (ref 6.4–8.2)
RBC # BLD AUTO: 5.69 X10*6/UL (ref 4–5.2)
RHEUMATOID FACT SER NEPH-ACNC: <10 IU/ML (ref 0–15)
SODIUM SERPL-SCNC: 138 MMOL/L (ref 136–145)
URATE SERPL-MCNC: 3.7 MG/DL (ref 2.3–6.7)
WBC # BLD AUTO: 6.3 X10*3/UL (ref 4.4–11.3)

## 2024-05-17 PROCEDURE — 84550 ASSAY OF BLOOD/URIC ACID: CPT

## 2024-05-17 PROCEDURE — 82962 GLUCOSE BLOOD TEST: CPT | Performed by: FAMILY MEDICINE

## 2024-05-17 PROCEDURE — 86038 ANTINUCLEAR ANTIBODIES: CPT

## 2024-05-17 PROCEDURE — 36415 COLL VENOUS BLD VENIPUNCTURE: CPT

## 2024-05-17 PROCEDURE — 85025 COMPLETE CBC W/AUTO DIFF WBC: CPT

## 2024-05-17 PROCEDURE — 86140 C-REACTIVE PROTEIN: CPT

## 2024-05-17 PROCEDURE — 86431 RHEUMATOID FACTOR QUANT: CPT

## 2024-05-17 PROCEDURE — 85379 FIBRIN DEGRADATION QUANT: CPT

## 2024-05-17 PROCEDURE — 93000 ELECTROCARDIOGRAM COMPLETE: CPT | Performed by: FAMILY MEDICINE

## 2024-05-17 PROCEDURE — 81002 URINALYSIS NONAUTO W/O SCOPE: CPT | Performed by: FAMILY MEDICINE

## 2024-05-17 PROCEDURE — 82010 KETONE BODYS QUAN: CPT

## 2024-05-17 PROCEDURE — 80053 COMPREHEN METABOLIC PANEL: CPT

## 2024-05-17 PROCEDURE — 85652 RBC SED RATE AUTOMATED: CPT

## 2024-05-17 PROCEDURE — 86200 CCP ANTIBODY: CPT

## 2024-05-17 PROCEDURE — 99214 OFFICE O/P EST MOD 30 MIN: CPT | Performed by: FAMILY MEDICINE

## 2024-05-17 PROCEDURE — 83735 ASSAY OF MAGNESIUM: CPT

## 2024-05-17 PROCEDURE — 86308 HETEROPHILE ANTIBODY SCREEN: CPT

## 2024-05-17 RX ORDER — ESTERIFIED ESTROGEN AND METHYLTESTOSTERONE .625; 1.25 MG/1; MG/1
1 TABLET ORAL DAILY
Qty: 30 TABLET | Refills: 2 | Status: SHIPPED | OUTPATIENT
Start: 2024-05-17

## 2024-05-17 RX ORDER — FLUOCINONIDE TOPICAL SOLUTION USP, 0.05% 0.5 MG/ML
SOLUTION TOPICAL
Qty: 40 ML | Refills: 0 | OUTPATIENT
Start: 2024-05-17

## 2024-05-17 RX ORDER — LIRAGLUTIDE 6 MG/ML
INJECTION SUBCUTANEOUS DAILY
Qty: 9 ML | Refills: 2 | Status: SHIPPED | OUTPATIENT
Start: 2024-05-17 | End: 2024-05-24 | Stop reason: SDUPTHER

## 2024-05-17 RX ORDER — TRAMADOL HYDROCHLORIDE 50 MG/1
50 TABLET ORAL EVERY 6 HOURS PRN
Qty: 15 TABLET | Refills: 0 | Status: SHIPPED | OUTPATIENT
Start: 2024-05-17 | End: 2024-05-24

## 2024-05-17 RX ORDER — IBUPROFEN 800 MG/1
TABLET ORAL
Qty: 270 TABLET | Refills: 0 | Status: SHIPPED | OUTPATIENT
Start: 2024-05-17

## 2024-05-17 RX ORDER — GLIPIZIDE 5 MG/1
5 TABLET ORAL
Qty: 60 TABLET | Refills: 1 | Status: SHIPPED | OUTPATIENT
Start: 2024-05-17 | End: 2024-06-10

## 2024-05-17 RX ORDER — EMPAGLIFLOZIN 25 MG/1
25 TABLET, FILM COATED ORAL DAILY
Qty: 90 TABLET | Refills: 1 | Status: SHIPPED | OUTPATIENT
Start: 2024-05-17

## 2024-05-17 ASSESSMENT — ENCOUNTER SYMPTOMS
EYE PAIN: 0
HEADACHES: 0
SORE THROAT: 0
SHORTNESS OF BREATH: 0
PHOTOPHOBIA: 0
SPEECH DIFFICULTY: 0
AGITATION: 0
TROUBLE SWALLOWING: 0
FEVER: 0
DYSURIA: 0
COUGH: 0
FATIGUE: 1
POLYPHAGIA: 0
RECTAL PAIN: 0
SINUS PAIN: 0
DIARRHEA: 0
PALPITATIONS: 0
APPETITE CHANGE: 0
DYSPHORIC MOOD: 0
HEMATURIA: 0
NERVOUS/ANXIOUS: 0
STRIDOR: 0
FLANK PAIN: 0
ABDOMINAL DISTENTION: 0
RHINORRHEA: 0
SINUS PRESSURE: 0
BLOOD IN STOOL: 0
CHEST TIGHTNESS: 0
CONFUSION: 0
ARTHRALGIAS: 1
DIZZINESS: 0
ABDOMINAL PAIN: 0
MYALGIAS: 1
SEIZURES: 0
CONSTIPATION: 0
COLOR CHANGE: 1
ACTIVITY CHANGE: 1
ADENOPATHY: 0
SLEEP DISTURBANCE: 0
DECREASED CONCENTRATION: 0
POLYDIPSIA: 0
NECK STIFFNESS: 0

## 2024-05-17 ASSESSMENT — PATIENT HEALTH QUESTIONNAIRE - PHQ9
SUM OF ALL RESPONSES TO PHQ9 QUESTIONS 1 AND 2: 0
2. FEELING DOWN, DEPRESSED OR HOPELESS: NOT AT ALL
1. LITTLE INTEREST OR PLEASURE IN DOING THINGS: NOT AT ALL

## 2024-05-17 NOTE — PROGRESS NOTES
Subjective   Patient ID: Darcy Kim is a 48 y.o. female who presents for Diabetes.    Patient states feeling sick. Patient states having severe pain in her muscle, bone and skin. Patient states have the pain for 6 months now but it are worse. 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    Patient states can not afford Victoza and would like discuss other option.    Patient would discuss test result    Patient had done blood work on 05/13/2024.         Review of Systems   Constitutional:  Positive for activity change and fatigue. Negative for appetite change and fever.   HENT:  Negative for congestion, dental problem, ear discharge, ear pain, mouth sores, rhinorrhea, sinus pressure, sinus pain, sore throat, tinnitus and trouble swallowing.    Eyes:  Negative for photophobia, pain and visual disturbance.   Respiratory:  Negative for cough, chest tightness, shortness of breath and stridor.    Cardiovascular:  Positive for leg swelling. Negative for chest pain and palpitations.   Gastrointestinal:  Negative for abdominal distention, abdominal pain, blood in stool, constipation, diarrhea and rectal pain.   Endocrine: Negative for cold intolerance, heat intolerance, polydipsia, polyphagia and polyuria.   Genitourinary:  Negative for dysuria, flank pain, hematuria and urgency.   Musculoskeletal:  Positive for arthralgias and myalgias. Negative for gait problem and neck stiffness.   Skin:  Positive for color change. Negative for rash.   Allergic/Immunologic: Negative for environmental allergies and food allergies.   Neurological:  Negative for dizziness, seizures, syncope, speech difficulty and headaches.   Hematological:  Negative for adenopathy.   Psychiatric/Behavioral:  Negative for agitation, confusion, decreased concentration, dysphoric mood and sleep disturbance. The patient is not nervous/anxious.        Objective   /70 (BP Location: Right arm, Patient Position: Sitting, BP  "Cuff Size: Adult)   Pulse 81   Temp 36.1 °C (97 °F)   Resp 18   Ht 1.6 m (5' 3\")   Wt 68.5 kg (151 lb)   LMP  (LMP Unknown)   SpO2 97%   BMI 26.75 kg/m²     Physical Exam  Vitals reviewed.   Constitutional:       General: She is not in acute distress.     Appearance: Normal appearance. She is normal weight. She is ill-appearing. She is not diaphoretic.   HENT:      Head: Normocephalic.      Right Ear: Tympanic membrane and external ear normal.      Left Ear: Tympanic membrane and external ear normal.      Nose: Nose normal. No congestion.      Mouth/Throat:      Pharynx: No posterior oropharyngeal erythema.   Eyes:      General:         Right eye: No discharge.         Left eye: No discharge.      Extraocular Movements: Extraocular movements intact.      Conjunctiva/sclera: Conjunctivae normal.      Pupils: Pupils are equal, round, and reactive to light.   Cardiovascular:      Rate and Rhythm: Normal rate and regular rhythm.      Pulses: Normal pulses.      Heart sounds: Normal heart sounds. No murmur heard.  Pulmonary:      Effort: Pulmonary effort is normal. No respiratory distress.      Breath sounds: Normal breath sounds. No wheezing or rales.   Chest:      Chest wall: No tenderness.   Abdominal:      General: Abdomen is flat. Bowel sounds are normal. There is no distension.      Palpations: There is no mass.      Tenderness: There is no abdominal tenderness. There is no guarding.   Musculoskeletal:         General: No tenderness. Normal range of motion.      Cervical back: Normal range of motion and neck supple. No tenderness.      Right lower leg: No edema.      Left lower leg: No edema.   Skin:     General: Skin is dry.      Coloration: Skin is not jaundiced.      Findings: No bruising, erythema or rash.   Neurological:      General: No focal deficit present.      Mental Status: She is alert and oriented to person, place, and time. Mental status is at baseline.      Cranial Nerves: No cranial nerve " deficit.      Sensory: No sensory deficit.      Coordination: Coordination normal.      Gait: Gait normal.   Psychiatric:         Mood and Affect: Mood normal.         Thought Content: Thought content normal.         Judgment: Judgment normal.         Assessment/Plan   Problem List Items Addressed This Visit             ICD-10-CM    Type 2 diabetes mellitus without complication, without long-term current use of insulin (Multi) - Primary E11.9    Relevant Medications    liraglutide (Victoza 3-Elias) 0.6 mg/0.1 mL (18 mg/3 mL) injection    glipiZIDE (Glucotrol) 5 mg tablet    Other Relevant Orders    POCT fingerstick glucose manually resulted (Completed)    Follow Up In [facility] Pharmacy    POCT UA (nonautomated) manually resulted (Completed)    Beta Hydroxybutyrate (Completed)    Primary hypertension I10    Relevant Orders    ECG 12 lead (Clinic Performed)    Arthralgia of multiple joints M25.50    Relevant Medications    traMADol (Ultram) 50 mg tablet    Other Relevant Orders    Uric Acid (Completed)    C-Reactive Protein (Completed)    Rheumatoid Factor (Completed)    Sedimentation Rate (Completed)    Citrulline Antibody, IgG (Completed)    TYLER without Reflex VERONICA    CBC and Auto Differential (Completed)    Mononucleosis screen (Completed)    Magnesium (Completed)    Comprehensive Metabolic Panel (Completed)    POCT UA (nonautomated) manually resulted (Completed)    Vitamin D deficiency E55.9    Chronic fatigue R53.82    Relevant Orders    CBC and Auto Differential (Completed)    Mononucleosis screen (Completed)    Magnesium (Completed)    Comprehensive Metabolic Panel (Completed)     Other Visit Diagnoses         Codes    Encounter for general adult medical examination without abnormal findings     Z00.00    Relevant Medications    estrogens-methyltestosterone (Estratest HS) 0.625-1.25 mg tablet    Shortness of breath     R06.02    Relevant Orders    D-dimer, quantitative (Completed)               Scribe Attestation  By signing my name below, I, JOSÉ Loza , [place]   attest that this documentation has been prepared under the direction and in the presence of Mike Pierce DO.   Provider Attestation - Scribe documentation    All medical record entries made by the Scribe were at my direction and personally dictated by me. I have reviewed the chart and agree that the record accurately reflects my personal performance of the history, physical exam, discussion and plan.

## 2024-05-17 NOTE — PATIENT INSTRUCTIONS
Follow up in 2 weeks    Continue current medications and therapy for chronic medical conditions.    Patient was advised importance of proper diet/nutrition in addition adequate hydration. Patient was encouraged moderate exercise program to include 30 minutes daily for 5 days of the week or 150 minutes weekly. Patient will follow-up with us as scheduled.    Obtain Arthritic Panel, CBC, D-dimer and Mono Spot     IO Glucose today     Resume Victoza    Start Glucotrol 5 mg twice daily with meals    Start tramadol 50 mg every 6 hours #30 x 0

## 2024-05-18 LAB — CCP IGG SERPL-ACNC: <1 U/ML

## 2024-05-24 ENCOUNTER — TELEPHONE (OUTPATIENT)
Dept: PHARMACY | Facility: HOSPITAL | Age: 49
End: 2024-05-24
Payer: COMMERCIAL

## 2024-05-24 DIAGNOSIS — E11.9 TYPE 2 DIABETES MELLITUS WITHOUT COMPLICATION, WITHOUT LONG-TERM CURRENT USE OF INSULIN (MULTI): ICD-10-CM

## 2024-05-24 LAB — ANA SER QL HEP2 SUBST: NEGATIVE

## 2024-05-24 RX ORDER — LIRAGLUTIDE 6 MG/ML
INJECTION SUBCUTANEOUS DAILY
Qty: 9 ML | Refills: 2 | Status: SHIPPED | OUTPATIENT
Start: 2024-05-24 | End: 2024-09-05

## 2024-05-24 NOTE — TELEPHONE ENCOUNTER
Patient called due to her blood sugars increasing. Dr. Pierce has restarted patient on Victoza, but she was unable to get it from the pharmacy. Patient states [facility] has the medication in stock, so will send to [facility].    Patient also states she has been feeling dizzy since starting Glipizide 5mg. She is only taking once daily at night. Encouraged patient to take with food, as it can cause low blood sugars.    In addition, patient endorsed possible yeast infection. Encouraged patient to drink lots of water and call PCP office if needed or does not go away. Discussed signs/symptoms of UTI to look out ofr as well.    Follow up with Clinical Pharmacy Team 6/7/2024 at 10:30AM.    Continue all meds under the continuation of care with the referring provider and clinical pharmacy team.    Please reach out to the Clinical Pharmacy Team if there are any further questions.     Verbal consent to manage patient's drug therapy was obtained from patient. They were informed they may decline to participate or withdraw from participation in pharmacy services at any time.    Diana Veloz, PharmD  [phone]

## 2024-05-31 ENCOUNTER — TELEMEDICINE (OUTPATIENT)
Dept: PRIMARY CARE | Facility: CLINIC | Age: 49
End: 2024-05-31
Payer: COMMERCIAL

## 2024-05-31 DIAGNOSIS — E11.9 TYPE 2 DIABETES MELLITUS WITHOUT COMPLICATION, WITHOUT LONG-TERM CURRENT USE OF INSULIN (MULTI): Primary | ICD-10-CM

## 2024-05-31 DIAGNOSIS — I10 PRIMARY HYPERTENSION: ICD-10-CM

## 2024-05-31 DIAGNOSIS — D75.1 POLYCYTHEMIA: ICD-10-CM

## 2024-05-31 PROCEDURE — 99443 PR PHYS/QHP TELEPHONE EVALUATION 21-30 MIN: CPT | Performed by: FAMILY MEDICINE

## 2024-05-31 RX ORDER — DULAGLUTIDE 0.75 MG/.5ML
0.75 INJECTION, SOLUTION SUBCUTANEOUS
Qty: 2 ML | Refills: 0 | Status: SHIPPED | OUTPATIENT
Start: 2024-06-02 | End: 2024-06-07 | Stop reason: ALTCHOICE

## 2024-05-31 ASSESSMENT — ENCOUNTER SYMPTOMS
STRIDOR: 0
DIARRHEA: 0
DIZZINESS: 0
SINUS PRESSURE: 0
HEMATURIA: 0
SEIZURES: 0
FATIGUE: 0
COLOR CHANGE: 0
CHEST TIGHTNESS: 0
ABDOMINAL PAIN: 0
ACTIVITY CHANGE: 0
DYSURIA: 0
ARTHRALGIAS: 0
ABDOMINAL DISTENTION: 0
MYALGIAS: 0
COUGH: 0
SLEEP DISTURBANCE: 0
PALPITATIONS: 0
SPEECH DIFFICULTY: 0
HEADACHES: 0
RECTAL PAIN: 0
SHORTNESS OF BREATH: 0
APPETITE CHANGE: 0
DYSPHORIC MOOD: 0
AGITATION: 0
SORE THROAT: 0
FEVER: 0
PHOTOPHOBIA: 0
NERVOUS/ANXIOUS: 0
TROUBLE SWALLOWING: 0
ADENOPATHY: 0
FLANK PAIN: 0
EYE PAIN: 0
NECK STIFFNESS: 0
POLYDIPSIA: 0
POLYPHAGIA: 0
CONSTITUTIONAL NEGATIVE: 1
CONSTIPATION: 0
DECREASED CONCENTRATION: 0
BLOOD IN STOOL: 0
CONFUSION: 0

## 2024-05-31 NOTE — PROGRESS NOTES
Subjective   Patient ID: Darcy Kim is a 48 y.o. female who presents for Diabetes.    Patient states need a letter for her school today. Patient states the letter have to be clear that she is under Dr. Pierce care for diabetes and that she was sick 05/07/2024 but can get in the office until 05/17/2024.          Review of Systems   Constitutional: Negative.  Negative for activity change, appetite change, fatigue and fever.   HENT:  Positive for rhinorrhea and sinus pain. Negative for congestion, dental problem, ear discharge, ear pain, mouth sores, sinus pressure, sore throat, tinnitus and trouble swallowing.    Eyes:  Negative for photophobia, pain and visual disturbance.   Respiratory:  Negative for cough, chest tightness, shortness of breath and stridor.    Cardiovascular:  Negative for chest pain and palpitations.   Gastrointestinal:  Negative for abdominal distention, abdominal pain, blood in stool, constipation, diarrhea and rectal pain.   Endocrine: Negative for cold intolerance, heat intolerance, polydipsia, polyphagia and polyuria.   Genitourinary:  Negative for dysuria, flank pain, hematuria and urgency.   Musculoskeletal:  Negative for arthralgias, gait problem, myalgias and neck stiffness.   Skin:  Negative for color change and rash.   Allergic/Immunologic: Negative for environmental allergies and food allergies.   Neurological:  Negative for dizziness, seizures, syncope, speech difficulty and headaches.   Hematological:  Negative for adenopathy.   Psychiatric/Behavioral:  Negative for agitation, confusion, decreased concentration, dysphoric mood and sleep disturbance. The patient is not nervous/anxious.        Objective   LMP  (LMP Unknown)     Physical Exam  Neurological:      Mental Status: She is alert and oriented to person, place, and time.   Psychiatric:         Mood and Affect: Mood normal.         Behavior: Behavior normal.         Thought Content: Thought content normal.        Constitutional: Well developed, well nourished, alert and in no acute distress   Psychiatric: Mood calm and affect normal    Telephone Visit - Audio Communication Only      Assessment/Plan   Problem List Items Addressed This Visit             ICD-10-CM    Type 2 diabetes mellitus without complication, without long-term current use of insulin (Multi) - Primary E11.9    Relevant Medications    dulaglutide (Trulicity) 0.75 mg/0.5 mL pen injector    Polycythemia D75.1    Primary hypertension I10         Scribe Attestation  By signing my name below, I, JOSÉ Loza , [place]   attest that this documentation has been prepared under the direction and in the presence of Mike Pierce DO.   Provider Attestation - Scribe documentation    All medical record entries made by the Scribe were at my direction and personally dictated by me. I have reviewed the chart and agree that the record accurately reflects my personal performance of the history, physical exam, discussion and plan.

## 2024-05-31 NOTE — PATIENT INSTRUCTIONS
Follow up in 4 weeks    Continue current medications and therapy for chronic medical conditions.    Patient was advised importance of proper diet/nutrition in addition adequate hydration. Patient was encouraged moderate exercise program to include 30 minutes daily for 5 days of the week or 150 minutes weekly. Patient will follow-up with us as scheduled.    Review lab results from May 2024    Start Trulicity 0.75 mg weekly    Teleconference time 22 minutes

## 2024-06-02 ASSESSMENT — ENCOUNTER SYMPTOMS
SINUS PAIN: 1
RHINORRHEA: 1

## 2024-06-07 ENCOUNTER — TELEMEDICINE (OUTPATIENT)
Dept: PHARMACY | Facility: HOSPITAL | Age: 49
End: 2024-06-07
Payer: COMMERCIAL

## 2024-06-07 DIAGNOSIS — E11.9 TYPE 2 DIABETES MELLITUS WITHOUT COMPLICATION, WITHOUT LONG-TERM CURRENT USE OF INSULIN (MULTI): ICD-10-CM

## 2024-06-07 PROCEDURE — RXMED WILLOW AMBULATORY MEDICATION CHARGE

## 2024-06-07 RX ORDER — DULAGLUTIDE 1.5 MG/.5ML
1.5 INJECTION, SOLUTION SUBCUTANEOUS
Qty: 2 ML | Refills: 1 | Status: SHIPPED | OUTPATIENT
Start: 2024-06-09

## 2024-06-07 RX ORDER — LANCETS
EACH MISCELLANEOUS
Qty: 100 EACH | Refills: 3 | Status: SHIPPED | OUTPATIENT
Start: 2024-06-07

## 2024-06-07 RX ORDER — DEXTROSE 4 G
TABLET,CHEWABLE ORAL
Qty: 1 EACH | Refills: 0 | Status: SHIPPED | OUTPATIENT
Start: 2024-06-07

## 2024-06-07 RX ORDER — IBUPROFEN 200 MG
CAPSULE ORAL
Qty: 100 EACH | Refills: 3 | Status: SHIPPED | OUTPATIENT
Start: 2024-06-07

## 2024-06-07 ASSESSMENT — ENCOUNTER SYMPTOMS: DIABETIC ASSOCIATED SYMPTOMS: 0

## 2024-06-07 NOTE — PROGRESS NOTES
"Clinical Pharmacy Appointment    Subjective   Patient ID: Darcy Kim \"Darcy Kim\" is a 48 y.o. female who presents for Diabetes.    Referring Provider: Mike Pierce, DO     Diabetes  She presents for her follow-up diabetic visit. She has type 2 diabetes mellitus. Her disease course has been stable. There are no hypoglycemic associated symptoms. There are no diabetic associated symptoms. There are no hypoglycemic complications. Risk factors for coronary artery disease include family history, dyslipidemia and diabetes mellitus. Current diabetic treatments: Jardiance 25mg daily, Glipizide 5mg daily, Trulicity 0.75mg weekly (Saturday) She is compliant with treatment all of the time. (Patient does not check BG at home because her meter is broken. States the last time she checked at work her BG was 140mg/dL.) She does not see a podiatrist.Eye exam is current.     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    Polycythemia  Patient states PCP discussed this diagnosis with the patient last visit but she had more questions that did not get answered.  [facility] discussed with the PCP and Dr. Pierce states that she will get her questions answered at her hematology appointment, as " they will take care of everything from here on out.      Objective     Labs  Lab Results   Component Value Date    BILITOT 0.5 05/17/2024    CALCIUM 9.5 05/17/2024    CO2 27 05/17/2024     05/17/2024    CREATININE 0.63 05/17/2024    GLUCOSE 194 (H) 05/17/2024    ALKPHOS 93 05/17/2024    K 4.3 05/17/2024    PROT 6.7 05/17/2024     05/17/2024    AST 14 05/17/2024    ALT 22 05/17/2024    BUN 13 05/17/2024    ANIONGAP 12 05/17/2024    MG 1.99 05/17/2024     03/04/2024    ALBUMIN 4.3 05/17/2024    GFRF >90 09/29/2022     Lab Results   Component Value Date    TRIG 114 05/13/2024    CHOL 203 (H) 05/13/2024    LDLCALC 139 (H) 05/13/2024    HDL 41.0 05/13/2024     Lab Results   Component Value Date    HGBA1C 9.7 (H) 05/13/2024     Current Outpatient Medications on File Prior to Visit   Medication Sig Dispense Refill    estrogens-methyltestosterone (Estratest HS) 0.625-1.25 mg tablet Take 1 tablet by mouth once daily. 30 tablet 2    furosemide (Lasix) 20 mg tablet TAKE 1 TABLET BY MOUTH EVERY DAY 90 tablet 1    glipiZIDE (Glucotrol) 5 mg tablet Take 1 tablet (5 mg) by mouth 2 times a day before meals. 60 tablet 1    ibuprofen 800 mg tablet TAKE 1 TABLET BY MOUTH THREE TIMES A DAY AS NEEDED 270 tablet 0    Jardiance 25 mg TAKE 1 TABLET BY MOUTH EVERY DAY 90 tablet 1    liraglutide (Victoza 3-Elias) 0.6 mg/0.1 mL (18 mg/3 mL) injection Inject 0.1 mL (0.6 mg) under the skin once daily for 7 days, THEN 0.2 mL (1.2 mg) once daily for 7 days, THEN 0.3 mL (1.8 mg) once daily. 9 mL 2    multivitamin (MULTIPLE VITAMINS ORAL) Take 1 tablet by mouth.      valACYclovir (Valtrex) 1 gram tablet Take 1 tablet (1,000 mg) by mouth 2 times a day as needed (for cold sores). 20 tablet 1    [DISCONTINUED] dulaglutide (Trulicity) 0.75 mg/0.5 mL pen injector Inject 0.75 mg under the skin 1 (one) time per week. 2 mL 0     No current facility-administered medications on file prior to visit.      Assessment/Plan   Diabetes  Education  Rule of 15: eating ~15 g of carbs when BG less than 80 (half cup juice, 3-4 glucose tabs).  Recognize symptoms of high and low blood sugar.   Eat a realistic healthy diet consisting of fruits, vegetables, fiber, protein food choices on a regular basis and be aware of portion/serving sizes. Reduce carbohydrate consumption and always consume with protein and fat. Avoid foods high in saturated/trans fat, high salt content, and sweets and beverages with added sugars.  Limit alcohol consumption; alcohol may affect your blood sugar and cause hypoglycemia.   Stay active and incorporate ~30 mins of exercise into your daily routine to manage your weight and increase the body's acceptance of insulin.    PATIENT GOALS   Fasting B - 130 mg/dL 2-HR Postprandial BG:   Less than 180 mg/dL A1c:   Less than 7.0 %     Trulicity Education:  Counseled patient on Trulicity MOA, expectations, side effects, duration of therapy, administration, and monitoring parameters.  Provided detailed dosing and administration counseling to ensure proper technique.   Reviewed Trulicity titration schedule, starting with 0.75 mg once weekly for 4 weeks, then increasing the dose as tolerated. Patient verbalized understanding.  Counseled patient on the benefits of GLP-1ra, such as cardiovascular risk reduction, glycemic control, and weight loss potential.  Reviewed storage requirements of Trulicity when not in use, and when to administer the medication if a dose is missed.  Advised patient that they may experience improved satiety after meals and portion sizes of meals may be reduced as doses of Trulicity increase.  Counseled patient to avoid foods that are fatty/oily as this may precipitate the nausea/GI upset that may occur with new start Trulicity.     Problem List Items Addressed This Visit             ICD-10-CM    Type 2 diabetes mellitus without complication, without long-term current use of insulin (Multi) E11.9     Patients  diabetes is uncontrolled as evidenced by the most recent A1c of 9.7% (Goal <7%) on 5/13/2024.  RX CHANGES  CONTINUE Glipizide 5mg at night and Jardiance 25mg daily  INCREASE Trulicity to 1.5mg under the skin weekly once finished with 0.75mg dose. Will send to [facility]  START testing BG daily. Patient states her meter is broke. Will send new supplies to [facility].  Discussed with the patient that her LDL has been high the past few lab draws, and a statin should be started, especially for secondary prevention with diabetes. Patient states she is aware and her and Dr. Pierce are keeping an eye on it, however she would not like to start any additional medications at this time.  Continue working towards healthy diet and lifestyle  Discussed maintaining a healthy diet and watching carbohydrate count at each meal. 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 Patient states if she goes through a drive thru for a burger, she will only have one bun. Patient is making conscious choices to help decrease her blood sugars.         Relevant Medications    dulaglutide (Trulicity) 1.5 mg/0.5 mL pen injector injection (Start on 6/9/2024)    blood-glucose meter misc    blood sugar diagnostic (Blood Glucose Test) strip    lancets misc    Other Relevant Orders    Follow Up In [facility] - Pharmacy       Follow up with Clinical Pharmacy Team 7/12/2024 at 10:30AM.    Continue all meds under the continuation of care with the referring provider and clinical pharmacy team.    Please reach out to the Clinical Pharmacy Team if there are any further questions.     Verbal consent to manage patient's drug therapy was obtained from patient. They were informed they may decline to participate or withdraw from participation in pharmacy services at any time.    Diana Veloz, PharmD  [phone]

## 2024-06-07 NOTE — ASSESSMENT & PLAN NOTE
Patients diabetes is uncontrolled as evidenced by the most recent A1c of 9.7% (Goal <7%) on 5/13/2024.  RX CHANGES  CONTINUE Glipizide 5mg at night and Jardiance 25mg daily  INCREASE Trulicity to 1.5mg under the skin weekly once finished with 0.75mg dose. Will send to [facility]  START testing BG daily. Patient states her meter is broke. Will send new supplies to [facility].  Discussed with the patient that her LDL has been high the past few lab draws, and a statin should be started, especially for secondary prevention with diabetes. Patient states she is aware and her and Dr. Pierce are keeping an eye on it, however she would not like to start any additional medications at this time.  Continue working towards healthy diet and lifestyle  Discussed maintaining a healthy diet and watching carbohydrate count at each meal. 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 Patient states if she goes through a drive thru for a burger, she will only have one bun. Patient is making conscious choices to help decrease her blood sugars.

## 2024-06-08 DIAGNOSIS — E11.9 TYPE 2 DIABETES MELLITUS WITHOUT COMPLICATION, WITHOUT LONG-TERM CURRENT USE OF INSULIN (MULTI): ICD-10-CM

## 2024-06-10 ENCOUNTER — PHARMACY VISIT (OUTPATIENT)
Dept: PHARMACY | Facility: CLINIC | Age: 49
End: 2024-06-10
Payer: COMMERCIAL

## 2024-06-10 RX ORDER — GLIPIZIDE 5 MG/1
5 TABLET ORAL
Qty: 180 TABLET | Refills: 1 | Status: SHIPPED | OUTPATIENT
Start: 2024-06-10 | End: 2025-06-10

## 2024-07-05 DIAGNOSIS — E78.5 DYSLIPIDEMIA: ICD-10-CM

## 2024-07-05 NOTE — TELEPHONE ENCOUNTER
Records indicate that patient is in need of a statin due to diabetes. Please advise if this is appropriate for patient, which statin, or if a lipid/appointment is needed prior.

## 2024-07-10 ENCOUNTER — PHARMACY VISIT (OUTPATIENT)
Dept: PHARMACY | Facility: CLINIC | Age: 49
End: 2024-07-10
Payer: COMMERCIAL

## 2024-07-10 PROCEDURE — RXMED WILLOW AMBULATORY MEDICATION CHARGE

## 2024-07-10 RX ORDER — ATORVASTATIN CALCIUM 10 MG/1
10 TABLET, FILM COATED ORAL DAILY
Qty: 30 TABLET | Refills: 2 | Status: SHIPPED | OUTPATIENT
Start: 2024-07-10

## 2024-07-12 ENCOUNTER — APPOINTMENT (OUTPATIENT)
Dept: PHARMACY | Facility: HOSPITAL | Age: 49
End: 2024-07-12
Payer: COMMERCIAL

## 2024-07-22 ENCOUNTER — TELEPHONE (OUTPATIENT)
Dept: PRIMARY CARE | Facility: CLINIC | Age: 49
End: 2024-07-22
Payer: COMMERCIAL

## 2024-07-22 DIAGNOSIS — D75.1 POLYCYTHEMIA: ICD-10-CM

## 2024-07-22 NOTE — TELEPHONE ENCOUNTER
Dr. Pierce patient    Patient would like to know if she can have a referral to Hematology/Oncology for a second opinion. Patient would like referral to be sent to Dr. Oniel Downey at [facility] in [place].  Fax#: [phone]      Please advise, thank you.

## 2024-08-23 ENCOUNTER — APPOINTMENT (OUTPATIENT)
Dept: PHARMACY | Facility: HOSPITAL | Age: 49
End: 2024-08-23
Payer: COMMERCIAL

## 2024-08-23 DIAGNOSIS — E11.9 TYPE 2 DIABETES MELLITUS WITHOUT COMPLICATION, WITHOUT LONG-TERM CURRENT USE OF INSULIN (MULTI): ICD-10-CM

## 2024-08-23 PROCEDURE — RXMED WILLOW AMBULATORY MEDICATION CHARGE

## 2024-08-23 ASSESSMENT — ENCOUNTER SYMPTOMS: DIABETIC ASSOCIATED SYMPTOMS: 0

## 2024-08-23 NOTE — PROGRESS NOTES
"Clinical Pharmacy Appointment    Subjective   Patient ID: Darcy Kim \"Darcy Kim\" is a 49 y.o. female who presents for Diabetes.    Referring Provider: Mike Pierce, DO     Diabetes  She presents for her follow-up diabetic visit. She has type 2 diabetes mellitus. Her disease course has been stable. There are no hypoglycemic associated symptoms. There are no diabetic associated symptoms. There are no hypoglycemic complications. Risk factors for coronary artery disease include family history, dyslipidemia and diabetes mellitus. Current diabetic treatments: Jardiance 25mg daily, Glipizide 5mg 2 times a day, Trulicity 1.5mg weekly (Saturday) She is compliant with treatment all of the time. (Patient states testing blood sugars at night. States typically around 140mg/dL.) She does not see a podiatrist.Eye exam is current.     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    Polycythemia  Patient states PCP discussed this diagnosis with the patient last visit but she had more questions that did not get answered.  [facility] discussed with the PCP and Dr. Pierce states that she will get her questions answered at her hematology appointment, as they will take care of everything from " here on out.      Objective     Labs  Lab Results   Component Value Date    BILITOT 0.5 05/17/2024    CALCIUM 9.5 05/17/2024    CO2 27 05/17/2024     05/17/2024    CREATININE 0.63 05/17/2024    GLUCOSE 194 (H) 05/17/2024    ALKPHOS 93 05/17/2024    K 4.3 05/17/2024    PROT 6.7 05/17/2024     05/17/2024    AST 14 05/17/2024    ALT 22 05/17/2024    BUN 13 05/17/2024    ANIONGAP 12 05/17/2024    MG 1.99 05/17/2024     03/04/2024    ALBUMIN 4.3 05/17/2024    GFRF >90 09/29/2022     Lab Results   Component Value Date    TRIG 114 05/13/2024    CHOL 203 (H) 05/13/2024    LDLCALC 139 (H) 05/13/2024    HDL 41.0 05/13/2024     Lab Results   Component Value Date    HGBA1C 9.7 (H) 05/13/2024     Current Outpatient Medications on File Prior to Visit   Medication Sig Dispense Refill    atorvastatin (Lipitor) 10 mg tablet Take 1 tablet (10 mg) by mouth once daily. 30 tablet 2    blood sugar diagnostic (Blood Glucose Test) strip Use to test blood sugar once daily 100 each 3    blood-glucose meter misc Use to test blood sugar once daily 1 each 0    estrogens-methyltestosterone (Estratest HS) 0.625-1.25 mg tablet Take 1 tablet by mouth once daily. 30 tablet 2    furosemide (Lasix) 20 mg tablet TAKE 1 TABLET BY MOUTH EVERY DAY 90 tablet 1    glipiZIDE (Glucotrol) 5 mg tablet TAKE 1 TABLET (5 MG) BY MOUTH 2 TIMES A DAY BEFORE MEALS. 180 tablet 1    ibuprofen 800 mg tablet TAKE 1 TABLET BY MOUTH THREE TIMES A DAY AS NEEDED 270 tablet 0    Jardiance 25 mg TAKE 1 TABLET BY MOUTH EVERY DAY 90 tablet 1    lancets misc Use to test blood sugar once daily 100 each 3    liraglutide (Victoza 3-Elias) 0.6 mg/0.1 mL (18 mg/3 mL) injection Inject 0.1 mL (0.6 mg) under the skin once daily for 7 days, THEN 0.2 mL (1.2 mg) once daily for 7 days, THEN 0.3 mL (1.8 mg) once daily. 9 mL 2    multivitamin (MULTIPLE VITAMINS ORAL) Take 1 tablet by mouth.      valACYclovir (Valtrex) 1 gram tablet Take 1 tablet (1,000 mg) by mouth 2 times a  day as needed (for cold sores). 20 tablet 1    [DISCONTINUED] dulaglutide (Trulicity) 1.5 mg/0.5 mL pen injector injection Inject 1.5 mg under the skin 1 (one) time per week. 2 mL 1     No current facility-administered medications on file prior to visit.      Assessment/Plan   Diabetes Education  Rule of 15: eating ~15 g of carbs when BG less than 80 (half cup juice, 3-4 glucose tabs).  Recognize symptoms of high and low blood sugar.   Eat a realistic healthy diet consisting of fruits, vegetables, fiber, protein food choices on a regular basis and be aware of portion/serving sizes. Reduce carbohydrate consumption and always consume with protein and fat. Avoid foods high in saturated/trans fat, high salt content, and sweets and beverages with added sugars.  Limit alcohol consumption; alcohol may affect your blood sugar and cause hypoglycemia.   Stay active and incorporate ~30 mins of exercise into your daily routine to manage your weight and increase the body's acceptance of insulin.    PATIENT GOALS   Fasting B - 130 mg/dL 2-HR Postprandial BG:   Less than 180 mg/dL A1c:   Less than 7.0 %     Trulicity Education:  Counseled patient on Trulicity MOA, expectations, side effects, duration of therapy, administration, and monitoring parameters.  Provided detailed dosing and administration counseling to ensure proper technique.   Reviewed Trulicity titration schedule, starting with 0.75 mg once weekly for 4 weeks, then increasing the dose as tolerated. Patient verbalized understanding.  Counseled patient on the benefits of GLP-1ra, such as cardiovascular risk reduction, glycemic control, and weight loss potential.  Reviewed storage requirements of Trulicity when not in use, and when to administer the medication if a dose is missed.  Advised patient that they may experience improved satiety after meals and portion sizes of meals may be reduced as doses of Trulicity increase.  Counseled patient to avoid foods that  are fatty/oily as this may precipitate the nausea/GI upset that may occur with new start Trulicity.     Problem List Items Addressed This Visit             ICD-10-CM    Type 2 diabetes mellitus without complication, without long-term current use of insulin (Multi) E11.9     Patients diabetes is uncontrolled as evidenced by the most recent A1c of 9.7% (Goal <7%) on 5/13/2024. Patient is due for an updated A1c, will send lab.  RX CHANGES  CONTINUE Glipizide 5mg at night and Jardiance 25mg daily  INCREASE Trulicity to 3mg under the skin weekly. Will send to [facility] pharmacy for pickup.  CONTINUE testing BG daily. Patient testing at night. Encouraged patient to check blood sugars in the morning before any food or drink.  Discussed with the patient that her LDL has been high the past few lab draws, and a statin should be started, especially for secondary prevention with diabetes. Patient states she is aware and her and Dr. Pierce are keeping an eye on it, however she would not like to start any additional medications at this time.  Continue working towards healthy diet and lifestyle  Discussed maintaining a healthy diet and watching carbohydrate count at each meal. 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 Patient states if she goes through a drive thru for a burger, she will only have one bun. Patient is making conscious choices to help decrease her blood sugars.         Relevant Medications    dulaglutide 3 mg/0.5 mL pen injector (Start on 8/25/2024)    Other Relevant Orders    Follow Up In [facility] - Pharmacy    Hemoglobin A1c       Follow up with Clinical Pharmacy Team 9/20/2024 at 10:00AM.    Continue all meds under the continuation of care with the referring provider and clinical pharmacy team.    Please reach out to the Clinical Pharmacy Team if there are any further questions.     Verbal consent to manage  patient's drug therapy was obtained from patient. They were informed they may decline to participate or withdraw from participation in pharmacy services at any time.    Diana Veloz, PharmD  [phone]

## 2024-08-23 NOTE — ASSESSMENT & PLAN NOTE
Patients diabetes is uncontrolled as evidenced by the most recent A1c of 9.7% (Goal <7%) on 5/13/2024. Patient is due for an updated A1c, will send lab.  RX CHANGES  CONTINUE Glipizide 5mg at night and Jardiance 25mg daily  INCREASE Trulicity to 3mg under the skin weekly. Will send to [facility] pharmacy for pickup.  CONTINUE testing BG daily. Patient testing at night. Encouraged patient to check blood sugars in the morning before any food or drink.  Discussed with the patient that her LDL has been high the past few lab draws, and a statin should be started, especially for secondary prevention with diabetes. Patient states she is aware and her and Dr. Pierce are keeping an eye on it, however she would not like to start any additional medications at this time.  Continue working towards healthy diet and lifestyle  Discussed maintaining a healthy diet and watching carbohydrate count at each meal. 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 Patient states if she goes through a drive thru for a burger, she will only have one bun. Patient is making conscious choices to help decrease her blood sugars.

## 2024-08-28 ENCOUNTER — PHARMACY VISIT (OUTPATIENT)
Dept: PHARMACY | Facility: CLINIC | Age: 49
End: 2024-08-28
Payer: COMMERCIAL

## 2024-09-10 DIAGNOSIS — Z00.00 ENCOUNTER FOR GENERAL ADULT MEDICAL EXAMINATION WITHOUT ABNORMAL FINDINGS: ICD-10-CM

## 2024-09-10 NOTE — TELEPHONE ENCOUNTER
Pt needs refill. Is completely out now.       estrogens-methyltestosterone (Estratest HS) 0.625-1.25 mg tablet      Pharmacy    [facility] [place] - [address]  [address], [facility] [address]  Phone: [phone]  Fax: [phone]

## 2024-09-11 ENCOUNTER — LAB (OUTPATIENT)
Dept: LAB | Facility: LAB | Age: 49
End: 2024-09-11
Payer: COMMERCIAL

## 2024-09-11 DIAGNOSIS — E11.9 TYPE 2 DIABETES MELLITUS WITHOUT COMPLICATION, WITHOUT LONG-TERM CURRENT USE OF INSULIN (MULTI): ICD-10-CM

## 2024-09-11 LAB
EST. AVERAGE GLUCOSE BLD GHB EST-MCNC: 169 MG/DL
HBA1C MFR BLD: 7.5 %

## 2024-09-11 PROCEDURE — 36415 COLL VENOUS BLD VENIPUNCTURE: CPT

## 2024-09-11 PROCEDURE — 83036 HEMOGLOBIN GLYCOSYLATED A1C: CPT

## 2024-09-11 RX ORDER — ESTERIFIED ESTROGEN AND METHYLTESTOSTERONE .625; 1.25 MG/1; MG/1
1 TABLET ORAL DAILY
Qty: 30 TABLET | Refills: 2 | OUTPATIENT
Start: 2024-09-11

## 2024-09-12 DIAGNOSIS — R22.42 LOCALIZED SWELLING OF LEFT FOOT: ICD-10-CM

## 2024-09-12 DIAGNOSIS — R22.43 LOCALIZED SWELLING OF BOTH LOWER LEGS: ICD-10-CM

## 2024-09-12 RX ORDER — FUROSEMIDE 20 MG/1
TABLET ORAL
Qty: 90 TABLET | Refills: 1 | Status: SHIPPED | OUTPATIENT
Start: 2024-09-12

## 2024-09-12 NOTE — TELEPHONE ENCOUNTER
Pt would like MA to please call her to further explain why she needs to see a gynecologist and have a mammogram done for estrogen refills.  She is requesting someone please call today as she has been waiting for refill/response for a couple of days now.

## 2024-09-13 ENCOUNTER — APPOINTMENT (OUTPATIENT)
Dept: PHARMACY | Facility: HOSPITAL | Age: 49
End: 2024-09-13
Payer: COMMERCIAL

## 2024-09-13 DIAGNOSIS — Z00.00 ENCOUNTER FOR GENERAL ADULT MEDICAL EXAMINATION WITHOUT ABNORMAL FINDINGS: ICD-10-CM

## 2024-09-13 DIAGNOSIS — E11.9 TYPE 2 DIABETES MELLITUS WITHOUT COMPLICATION, WITHOUT LONG-TERM CURRENT USE OF INSULIN (MULTI): ICD-10-CM

## 2024-09-13 RX ORDER — ESTERIFIED ESTROGEN AND METHYLTESTOSTERONE .625; 1.25 MG/1; MG/1
1 TABLET ORAL DAILY
Qty: 30 TABLET | Refills: 0 | OUTPATIENT
Start: 2024-09-13

## 2024-09-13 RX ORDER — ESTERIFIED ESTROGEN AND METHYLTESTOSTERONE .625; 1.25 MG/1; MG/1
1 TABLET ORAL DAILY
Qty: 30 TABLET | Refills: 0 | Status: SHIPPED | OUTPATIENT
Start: 2024-09-13

## 2024-09-13 ASSESSMENT — ENCOUNTER SYMPTOMS: DIABETIC ASSOCIATED SYMPTOMS: 0

## 2024-09-13 NOTE — TELEPHONE ENCOUNTER
Patient typically receives refills for Estratest HS from PCP. PCP discussed with patient to have an updated mammogram and gynecology referral prior to sending out a refill. Patient is out of refills so will send in 30 day supply until she can get an appointment.

## 2024-09-13 NOTE — ASSESSMENT & PLAN NOTE
Patient's goal A1c is < 7%.  Is pt at goal? No, 7.5% (2024)  Patient's SMBGs are limited, but around goal.     Rationale for plan: Patient just recently started on Trulicity 3mg weekly and is tolerating the medication well. Will continue on current dose for another month prior to increasing.    Medication Changes:  CONTINUE:  Trulicity 4.5mg under the skin once weekly  Jardiance 25mg daily  Glipizide 5mg twice a day      Future Considerations:  Continue titrating Trulicity as needed    Diabetes Education  Rule of 15: eating ~15 g of carbs when BG less than 80 (half cup juice, 3-4 glucose tabs).  Recognize symptoms of high and low blood sugar.   Eat a realistic healthy diet consisting of fruits, vegetables, fiber, protein food choices on a regular basis and be aware of portion/serving sizes. Reduce carbohydrate consumption and always consume with protein and fat. Avoid foods high in saturated/trans fat, high salt content, and sweets and beverages with added sugars.  Limit alcohol consumption; alcohol may affect your blood sugar and cause hypoglycemia.   Stay active and incorporate ~30 mins of exercise into your daily routine to manage your weight and increase the body's acceptance of insulin.    PATIENT GOALS   Fasting B - 130 mg/dL 2-HR Postprandial BG:   Less than 180 mg/dL A1c:   Less than 7.0 %     Trulicity Education:  Counseled patient on Trulicity MOA, expectations, side effects, duration of therapy, administration, and monitoring parameters.  Counseled patient on the benefits of GLP-1ra, such as cardiovascular risk reduction, glycemic control, and weight loss potential.  Provided detailed dosing and administration counseling to ensure proper technique.   Reviewed Trulicity titration schedule, starting with 0.75 mg once weekly to 1.5 mg, 3 mg, and if tolerated 4.5 mg.  Reviewed storage requirements of Trulicity when not in use, and when to administer the medication if a dose is missed.  Discussed  risks of GLP1ra including risk of pancreatitis, MTC and worsening of DR  Advised patient that they may experience improved satiety after meals and portion sizes of meals may be reduced as doses of Trulicity increase.

## 2024-09-20 ENCOUNTER — APPOINTMENT (OUTPATIENT)
Dept: PRIMARY CARE | Facility: CLINIC | Age: 49
End: 2024-09-20
Payer: COMMERCIAL

## 2024-10-25 ENCOUNTER — APPOINTMENT (OUTPATIENT)
Dept: PHARMACY | Facility: HOSPITAL | Age: 49
End: 2024-10-25
Payer: COMMERCIAL

## 2024-10-25 ENCOUNTER — PHARMACY VISIT (OUTPATIENT)
Dept: PHARMACY | Facility: CLINIC | Age: 49
End: 2024-10-25
Payer: COMMERCIAL

## 2024-10-25 DIAGNOSIS — E11.9 TYPE 2 DIABETES MELLITUS WITHOUT COMPLICATION, WITHOUT LONG-TERM CURRENT USE OF INSULIN (MULTI): ICD-10-CM

## 2024-10-25 PROCEDURE — RXMED WILLOW AMBULATORY MEDICATION CHARGE

## 2024-10-25 ASSESSMENT — ENCOUNTER SYMPTOMS: DIABETIC ASSOCIATED SYMPTOMS: 0

## 2024-10-25 NOTE — ASSESSMENT & PLAN NOTE
Patient's goal A1c is < 7%.  Is pt at goal? No, 7.5% (2024)  Patient's SMBGs are limited, but around goal.     Rationale for plan: Patient tolerating medications well, and blood sugars are limited. Will continue current doses and follow up in two months to check in on blood sugars.    Medication Changes:  CONTINUE:  Trulicity 3mg under the skin once weekly  Jardiance 25mg daily  Glipizide 5mg twice a day      Future Considerations:  Continue titrating Trulicity as needed    Diabetes Education  Rule of 15: eating ~15 g of carbs when BG less than 80 (half cup juice, 3-4 glucose tabs).  Recognize symptoms of high and low blood sugar.   Eat a realistic healthy diet consisting of fruits, vegetables, fiber, protein food choices on a regular basis and be aware of portion/serving sizes. Reduce carbohydrate consumption and always consume with protein and fat. Avoid foods high in saturated/trans fat, high salt content, and sweets and beverages with added sugars.  Limit alcohol consumption; alcohol may affect your blood sugar and cause hypoglycemia.   Stay active and incorporate ~30 mins of exercise into your daily routine to manage your weight and increase the body's acceptance of insulin.    PATIENT GOALS   Fasting B - 130 mg/dL 2-HR Postprandial BG:   Less than 180 mg/dL A1c:   Less than 7.0 %     Trulicity Education:  Counseled patient on Trulicity MOA, expectations, side effects, duration of therapy, administration, and monitoring parameters.  Counseled patient on the benefits of GLP-1ra, such as cardiovascular risk reduction, glycemic control, and weight loss potential.  Provided detailed dosing and administration counseling to ensure proper technique.   Reviewed Trulicity titration schedule, starting with 0.75 mg once weekly to 1.5 mg, 3 mg, and if tolerated 4.5 mg.  Reviewed storage requirements of Trulicity when not in use, and when to administer the medication if a dose is missed.  Discussed risks of  GLP1ra including risk of pancreatitis, MTC and worsening of DR  Advised patient that they may experience improved satiety after meals and portion sizes of meals may be reduced as doses of Trulicity increase.    Empagliflozin (Jardiance) Education:  Counseled patient on Empagliflozin (Jardiance) MOA, expectations, side effects, duration of therapy, administration, and monitoring parameters.  Reviewed the benefits of SGLT-2i therapy, such as glycemic control and kidney and CV protection.  Advised patient to practice proper  hygiene to reduce risk of UTIs or yeast infections.  Advised patient to maintain adequate fluid intake to remain hydrated while on SGLT2i therapy.  Answered all patient questions and concerns.

## 2024-10-25 NOTE — PROGRESS NOTES
"Clinical Pharmacy Appointment    Subjective   Patient ID: Darcy Kim \"Darcy Kim\" is a 49 y.o. female who presents for Diabetes.    Referring Provider: Mike Pierce,      Diabetes  She presents for her follow-up diabetic visit. She has type 2 diabetes mellitus. Her disease course has been stable. There are no hypoglycemic associated symptoms. There are no diabetic associated symptoms. There are no hypoglycemic complications. Risk factors for coronary artery disease include family history, dyslipidemia and diabetes mellitus. Current diabetic treatments: Jardiance 25mg daily, Glipizide 5mg 2 times a day, Trulicity 3mg weekly (Saturday) She is compliant with treatment all of the time. (Patient states testing blood sugars at night. States typically around 140mg/dL.) She does not see a podiatrist.Eye exam is current.     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      Objective     Labs  Lab Results   Component Value Date    BILITOT 0.5 05/17/2024    CALCIUM 9.5 05/17/2024    CO2 27 05/17/2024     05/17/2024    CREATININE 0.63 05/17/2024    GLUCOSE 194 (H) 05/17/2024    ALKPHOS 93 05/17/2024    K 4.3 05/17/2024    PROT 6.7 05/17/2024     05/17/2024    AST " 14 05/17/2024    ALT 22 05/17/2024    BUN 13 05/17/2024    ANIONGAP 12 05/17/2024    MG 1.99 05/17/2024     03/04/2024    ALBUMIN 4.3 05/17/2024    GFRF >90 09/29/2022     Lab Results   Component Value Date    TRIG 114 05/13/2024    CHOL 203 (H) 05/13/2024    LDLCALC 139 (H) 05/13/2024    HDL 41.0 05/13/2024     Lab Results   Component Value Date    HGBA1C 7.5 (H) 09/11/2024     Current Outpatient Medications on File Prior to Visit   Medication Sig Dispense Refill    atorvastatin (Lipitor) 10 mg tablet Take 1 tablet (10 mg) by mouth once daily. 30 tablet 2    blood sugar diagnostic (Blood Glucose Test) strip Use to test blood sugar once daily 100 each 3    blood-glucose meter misc Use to test blood sugar once daily 1 each 0    estrogens-methyltestosterone (Estratest HS) 0.625-1.25 mg tablet Take 1 tablet by mouth once daily. 30 tablet 0    furosemide (Lasix) 20 mg tablet TAKE 1 TABLET BY MOUTH EVERY DAY 90 tablet 1    glipiZIDE (Glucotrol) 5 mg tablet TAKE 1 TABLET (5 MG) BY MOUTH 2 TIMES A DAY BEFORE MEALS. 180 tablet 1    ibuprofen 800 mg tablet TAKE 1 TABLET BY MOUTH THREE TIMES A DAY AS NEEDED 270 tablet 0    lancets misc Use to test blood sugar once daily 100 each 3    multivitamin (MULTIPLE VITAMINS ORAL) Take 1 tablet by mouth.      valACYclovir (Valtrex) 1 gram tablet Take 1 tablet (1,000 mg) by mouth 2 times a day as needed (for cold sores). 20 tablet 1    [DISCONTINUED] dulaglutide 3 mg/0.5 mL pen injector Inject 3 mg under the skin 1 (one) time per week. 2 mL 1    [DISCONTINUED] Jardiance 25 mg TAKE 1 TABLET BY MOUTH EVERY DAY 90 tablet 1    [DISCONTINUED] liraglutide (Victoza 3-Elias) 0.6 mg/0.1 mL (18 mg/3 mL) injection Inject 0.1 mL (0.6 mg) under the skin once daily for 7 days, THEN 0.2 mL (1.2 mg) once daily for 7 days, THEN 0.3 mL (1.8 mg) once daily. 9 mL 2     No current facility-administered medications on file prior to visit.      Assessment/Plan     Problem List Items Addressed This Visit              ICD-10-CM    Type 2 diabetes mellitus without complication, without long-term current use of insulin (Multi) E11.9     Patient's goal A1c is < 7%.  Is pt at goal? No, 7.5% (2024)  Patient's SMBGs are limited, but around goal.     Rationale for plan: Patient tolerating medications well, and blood sugars are limited. Will continue current doses and follow up in two months to check in on blood sugars.    Medication Changes:  CONTINUE:  Trulicity 3mg under the skin once weekly  Jardiance 25mg daily  Glipizide 5mg twice a day      Future Considerations:  Continue titrating Trulicity as needed    Diabetes Education  Rule of 15: eating ~15 g of carbs when BG less than 80 (half cup juice, 3-4 glucose tabs).  Recognize symptoms of high and low blood sugar.   Eat a realistic healthy diet consisting of fruits, vegetables, fiber, protein food choices on a regular basis and be aware of portion/serving sizes. Reduce carbohydrate consumption and always consume with protein and fat. Avoid foods high in saturated/trans fat, high salt content, and sweets and beverages with added sugars.  Limit alcohol consumption; alcohol may affect your blood sugar and cause hypoglycemia.   Stay active and incorporate ~30 mins of exercise into your daily routine to manage your weight and increase the body's acceptance of insulin.    PATIENT GOALS   Fasting B - 130 mg/dL 2-HR Postprandial BG:   Less than 180 mg/dL A1c:   Less than 7.0 %     Trulicity Education:  Counseled patient on Trulicity MOA, expectations, side effects, duration of therapy, administration, and monitoring parameters.  Counseled patient on the benefits of GLP-1ra, such as cardiovascular risk reduction, glycemic control, and weight loss potential.  Provided detailed dosing and administration counseling to ensure proper technique.   Reviewed Trulicity titration schedule, starting with 0.75 mg once weekly to 1.5 mg, 3 mg, and if tolerated 4.5 mg.  Reviewed  storage requirements of Trulicity when not in use, and when to administer the medication if a dose is missed.  Discussed risks of GLP1ra including risk of pancreatitis, MTC and worsening of DR  Advised patient that they may experience improved satiety after meals and portion sizes of meals may be reduced as doses of Trulicity increase.    Empagliflozin (Jardiance) Education:  Counseled patient on Empagliflozin (Jardiance) MOA, expectations, side effects, duration of therapy, administration, and monitoring parameters.  Reviewed the benefits of SGLT-2i therapy, such as glycemic control and kidney and CV protection.  Advised patient to practice proper  hygiene to reduce risk of UTIs or yeast infections.  Advised patient to maintain adequate fluid intake to remain hydrated while on SGLT2i therapy.  Answered all patient questions and concerns.         Relevant Medications    empagliflozin (Jardiance) 25 mg    dulaglutide 3 mg/0.5 mL pen injector (Start on 10/27/2024)    Other Relevant Orders    Referral to Clinical Pharmacy         Follow up with Clinical Pharmacy Team 12/20/2024 at 10:00AM.    Continue all meds under the continuation of care with the referring provider and clinical pharmacy team.    Please reach out to the Clinical Pharmacy Team if there are any further questions.     Verbal consent to manage patient's drug therapy was obtained from patient. They were informed they may decline to participate or withdraw from participation in pharmacy services at any time.    Diana Veloz, PharmD  [phone]

## 2024-11-02 ENCOUNTER — OFFICE VISIT (OUTPATIENT)
Dept: URGENT CARE | Age: 49
End: 2024-11-02
Payer: COMMERCIAL

## 2024-11-02 DIAGNOSIS — Z11.1 ENCOUNTER FOR TB TINE TEST: Primary | ICD-10-CM

## 2024-11-04 ENCOUNTER — HOSPITAL ENCOUNTER (EMERGENCY)
Age: 49
Discharge: HOME | End: 2024-11-04
Attending: EMERGENCY MEDICINE
Payer: COMMERCIAL

## 2024-11-04 VITALS
HEART RATE: 75 BPM | WEIGHT: 155 LBS | TEMPERATURE: 99.1 F | BODY MASS INDEX: 27.46 KG/M2 | OXYGEN SATURATION: 97 % | RESPIRATION RATE: 20 BRPM | HEIGHT: 63 IN | DIASTOLIC BLOOD PRESSURE: 92 MMHG | SYSTOLIC BLOOD PRESSURE: 149 MMHG

## 2024-11-04 DIAGNOSIS — L03.211 FACIAL CELLULITIS: ICD-10-CM

## 2024-11-04 DIAGNOSIS — H60.502 ACUTE OTITIS EXTERNA OF LEFT EAR, UNSPECIFIED TYPE: Primary | ICD-10-CM

## 2024-11-04 LAB
INFLUENZA A BY PCR: NEGATIVE
INFLUENZA B BY PCR: NEGATIVE
SARS-COV-2 RDRP RESP QL NAA+PROBE: NOT DETECTED
STREP GRP A PCR: NEGATIVE

## 2024-11-04 PROCEDURE — 87502 INFLUENZA DNA AMP PROBE: CPT

## 2024-11-04 PROCEDURE — 6360000002 HC RX W HCPCS: Performed by: EMERGENCY MEDICINE

## 2024-11-04 PROCEDURE — 87635 SARS-COV-2 COVID-19 AMP PRB: CPT

## 2024-11-04 PROCEDURE — 2500000003 HC RX 250 WO HCPCS: Performed by: EMERGENCY MEDICINE

## 2024-11-04 PROCEDURE — 96372 THER/PROPH/DIAG INJ SC/IM: CPT

## 2024-11-04 PROCEDURE — 6370000000 HC RX 637 (ALT 250 FOR IP): Performed by: EMERGENCY MEDICINE

## 2024-11-04 PROCEDURE — 99284 EMERGENCY DEPT VISIT MOD MDM: CPT

## 2024-11-04 PROCEDURE — 87651 STREP A DNA AMP PROBE: CPT

## 2024-11-04 RX ORDER — CEFTRIAXONE 1 G/1
1000 INJECTION, POWDER, FOR SOLUTION INTRAMUSCULAR; INTRAVENOUS ONCE
Status: COMPLETED | OUTPATIENT
Start: 2024-11-04 | End: 2024-11-04

## 2024-11-04 RX ORDER — MORPHINE SULFATE 4 MG/ML
4 INJECTION INTRAVENOUS ONCE
Status: COMPLETED | OUTPATIENT
Start: 2024-11-04 | End: 2024-11-04

## 2024-11-04 RX ORDER — TRAMADOL HYDROCHLORIDE 50 MG/1
50 TABLET ORAL EVERY 6 HOURS PRN
Qty: 20 TABLET | Refills: 0 | Status: SHIPPED | OUTPATIENT
Start: 2024-11-04 | End: 2024-11-07

## 2024-11-04 RX ORDER — KETOROLAC TROMETHAMINE 10 MG/1
10 TABLET, FILM COATED ORAL EVERY 6 HOURS PRN
Qty: 20 TABLET | Refills: 0 | Status: SHIPPED | OUTPATIENT
Start: 2024-11-04

## 2024-11-04 RX ORDER — NEOMYCIN SULFATE, POLYMYXIN B SULFATE AND HYDROCORTISONE 10; 3.5; 1 MG/ML; MG/ML; [USP'U]/ML
4 SUSPENSION/ DROPS AURICULAR (OTIC) ONCE
Status: COMPLETED | OUTPATIENT
Start: 2024-11-04 | End: 2024-11-04

## 2024-11-04 RX ORDER — TRAMADOL HYDROCHLORIDE 50 MG/1
50 TABLET ORAL EVERY 6 HOURS PRN
Qty: 20 TABLET | Refills: 0 | Status: SHIPPED | OUTPATIENT
Start: 2024-11-04 | End: 2024-11-04

## 2024-11-04 RX ORDER — LIDOCAINE HYDROCHLORIDE 10 MG/ML
2.1 INJECTION, SOLUTION INFILTRATION; PERINEURAL ONCE
Status: COMPLETED | OUTPATIENT
Start: 2024-11-04 | End: 2024-11-04

## 2024-11-04 RX ADMIN — MORPHINE SULFATE 4 MG: 4 INJECTION INTRAVENOUS at 19:28

## 2024-11-04 RX ADMIN — LIDOCAINE HYDROCHLORIDE 2.1 ML: 10 INJECTION, SOLUTION INFILTRATION; PERINEURAL at 19:29

## 2024-11-04 RX ADMIN — NEOMYCIN SULFATE, POLYMYXIN B SULFATE AND HYDROCORTISONE 4 DROP: 10; 3.5; 1 SUSPENSION/ DROPS AURICULAR (OTIC) at 19:28

## 2024-11-04 RX ADMIN — CEFTRIAXONE 1000 MG: 1 INJECTION, POWDER, FOR SOLUTION INTRAMUSCULAR; INTRAVENOUS at 19:29

## 2024-11-04 ASSESSMENT — PAIN SCALES - GENERAL
PAINLEVEL_OUTOF10: 10
PAINLEVEL_OUTOF10: 10

## 2024-11-04 ASSESSMENT — ENCOUNTER SYMPTOMS
FACIAL SWELLING: 1
PHOTOPHOBIA: 0
CONSTIPATION: 0
COLOR CHANGE: 0
SORE THROAT: 1
NAUSEA: 0
WHEEZING: 0
APNEA: 0
DIARRHEA: 0
ABDOMINAL PAIN: 0
SINUS PRESSURE: 0
VOMITING: 0
EYE PAIN: 0
SHORTNESS OF BREATH: 0
RHINORRHEA: 0
COUGH: 0
ABDOMINAL DISTENTION: 0
BACK PAIN: 0

## 2024-11-04 ASSESSMENT — PAIN DESCRIPTION - ORIENTATION
ORIENTATION: LEFT
ORIENTATION: LEFT

## 2024-11-04 ASSESSMENT — PAIN DESCRIPTION - ONSET: ONSET: PROGRESSIVE

## 2024-11-04 ASSESSMENT — PAIN - FUNCTIONAL ASSESSMENT: PAIN_FUNCTIONAL_ASSESSMENT: 0-10

## 2024-11-04 ASSESSMENT — PAIN DESCRIPTION - LOCATION: LOCATION: HEAD;FACE

## 2024-11-04 ASSESSMENT — PAIN DESCRIPTION - PAIN TYPE: TYPE: ACUTE PAIN

## 2024-11-04 ASSESSMENT — LIFESTYLE VARIABLES
HOW MANY STANDARD DRINKS CONTAINING ALCOHOL DO YOU HAVE ON A TYPICAL DAY: PATIENT DOES NOT DRINK
HOW OFTEN DO YOU HAVE A DRINK CONTAINING ALCOHOL: NEVER

## 2024-11-04 ASSESSMENT — PAIN DESCRIPTION - DESCRIPTORS: DESCRIPTORS: THROBBING;SHARP

## 2024-11-04 ASSESSMENT — PAIN DESCRIPTION - FREQUENCY: FREQUENCY: CONTINUOUS

## 2024-11-04 NOTE — ED TRIAGE NOTES
Patient states left sided jaw pain began last night. Patient states the pain has progressed throughout the day. Patient rates pain 10/10. Patient now c/o of swelling and dizziness. Patient is a/o x3, ambulatory, appropriate to age.

## 2024-11-05 ENCOUNTER — HOSPITAL ENCOUNTER (OUTPATIENT)
Dept: RADIOLOGY | Facility: HOSPITAL | Age: 49
Discharge: HOME | End: 2024-11-05
Payer: COMMERCIAL

## 2024-11-05 ENCOUNTER — APPOINTMENT (OUTPATIENT)
Dept: URGENT CARE | Age: 49
End: 2024-11-05
Payer: COMMERCIAL

## 2024-11-05 VITALS — WEIGHT: 155 LBS | BODY MASS INDEX: 27.46 KG/M2

## 2024-11-05 DIAGNOSIS — Z12.31 ENCOUNTER FOR SCREENING MAMMOGRAM FOR BREAST CANCER: ICD-10-CM

## 2024-11-05 LAB
INDURATION: 0 MM
TB SKIN TEST: NEGATIVE

## 2024-11-05 PROCEDURE — 77067 SCR MAMMO BI INCL CAD: CPT | Performed by: RADIOLOGY

## 2024-11-05 PROCEDURE — 77063 BREAST TOMOSYNTHESIS BI: CPT | Performed by: RADIOLOGY

## 2024-11-05 PROCEDURE — 77067 SCR MAMMO BI INCL CAD: CPT

## 2024-11-11 DIAGNOSIS — Z00.00 ENCOUNTER FOR GENERAL ADULT MEDICAL EXAMINATION WITHOUT ABNORMAL FINDINGS: ICD-10-CM

## 2024-11-11 DIAGNOSIS — E11.9 TYPE 2 DIABETES MELLITUS WITHOUT COMPLICATION, WITHOUT LONG-TERM CURRENT USE OF INSULIN (MULTI): ICD-10-CM

## 2024-11-11 RX ORDER — ESTERIFIED ESTROGEN AND METHYLTESTOSTERONE .625; 1.25 MG/1; MG/1
1 TABLET ORAL DAILY
Qty: 30 TABLET | Refills: 0 | Status: SHIPPED | OUTPATIENT
Start: 2024-11-11

## 2024-11-11 NOTE — TELEPHONE ENCOUNTER
Dr. Pierce Pt    Refill for    estrogens-methyltestosterone (Estratest HS) 0.625-1.25 mg tablet  empagliflozin (Jardiance) 25 mg      *Pt is having hot flashes and really needs the estrogen refilled    [facility]    Pt had mammogram done 11/5  Pt is sched for pap 12/17

## 2024-11-12 ENCOUNTER — PHARMACY VISIT (OUTPATIENT)
Dept: PHARMACY | Facility: CLINIC | Age: 49
End: 2024-11-12
Payer: COMMERCIAL

## 2024-11-12 DIAGNOSIS — H66.90 ACUTE OTITIS MEDIA, UNSPECIFIED OTITIS MEDIA TYPE: ICD-10-CM

## 2024-11-12 PROCEDURE — RXMED WILLOW AMBULATORY MEDICATION CHARGE

## 2024-11-12 RX ORDER — LEVOFLOXACIN 500 MG/1
500 TABLET, FILM COATED ORAL DAILY
Qty: 10 TABLET | Refills: 0 | Status: SHIPPED | OUTPATIENT
Start: 2024-11-12 | End: 2024-11-22

## 2024-11-12 NOTE — TELEPHONE ENCOUNTER
PT TERRENCE YOUNG     PT CALLED IN B/C SHE WAS SEEN AT AN [facility] CARE FOR EAR INFECTION, PT HAS ONE MORE DAY LEFT FOR ANTIBIOTIC AND HER EAR IS STILL IN PAIN. PT IS CURRENTLY MIKAYLA AUGMENTIN.     PT IS UNABLE TO COME IN (WALK-IN) OFFICE TO BE SEEN B/C SHE IS WORKING    PLEASE ADVISE     [facility] IF MEDS ARE SENT BEFORE 4PM

## 2024-11-13 ENCOUNTER — PHARMACY VISIT (OUTPATIENT)
Dept: PHARMACY | Facility: CLINIC | Age: 49
End: 2024-11-13
Payer: COMMERCIAL

## 2024-11-20 DIAGNOSIS — E11.9 TYPE 2 DIABETES MELLITUS WITHOUT COMPLICATION, WITHOUT LONG-TERM CURRENT USE OF INSULIN (MULTI): ICD-10-CM

## 2024-11-21 ENCOUNTER — PHARMACY VISIT (OUTPATIENT)
Dept: PHARMACY | Facility: CLINIC | Age: 49
End: 2024-11-21
Payer: COMMERCIAL

## 2024-11-21 PROCEDURE — RXMED WILLOW AMBULATORY MEDICATION CHARGE

## 2024-11-21 RX ORDER — GLIPIZIDE 5 MG/1
5 TABLET ORAL
Qty: 60 TABLET | Refills: 0 | Status: SHIPPED | OUTPATIENT
Start: 2024-11-21 | End: 2025-11-21

## 2024-11-21 NOTE — TELEPHONE ENCOUNTER
Recent Visits  Date Type Provider Dept   05/17/24 Office Visit Mike Pierce DO Do [facility]   Showing recent visits within past 365 days and meeting all other requirements  Future Appointments  No visits were found meeting these conditions.  Showing future appointments within next 90 days and meeting all other requirements

## 2024-11-27 ENCOUNTER — PHARMACY VISIT (OUTPATIENT)
Dept: PHARMACY | Facility: CLINIC | Age: 49
End: 2024-11-27
Payer: COMMERCIAL

## 2024-11-27 PROCEDURE — RXMED WILLOW AMBULATORY MEDICATION CHARGE

## 2024-12-11 DIAGNOSIS — Z00.00 ENCOUNTER FOR GENERAL ADULT MEDICAL EXAMINATION WITHOUT ABNORMAL FINDINGS: ICD-10-CM

## 2024-12-11 NOTE — TELEPHONE ENCOUNTER
Dr. Pirece patient  Refill for:  estrogens-methyltestosterone (Estratest HS) 0.625-1.25 mg tablet  [facility]

## 2024-12-13 RX ORDER — ESTERIFIED ESTROGEN AND METHYLTESTOSTERONE .625; 1.25 MG/1; MG/1
1 TABLET ORAL DAILY
Qty: 30 TABLET | Refills: 0 | Status: SHIPPED | OUTPATIENT
Start: 2024-12-13

## 2024-12-13 NOTE — TELEPHONE ENCOUNTER
Recent Visits  Date Type Provider Dept   05/17/24 Office Visit Mike Pierce, DO [facility]   Showing recent visits within past 365 days and meeting all other requirements  Future Appointments  Date Type Provider Dept   01/10/25 Appointment Mike Pierec DO Do [facility]   Showing future appointments within next 90 days and meeting all other requirements

## 2024-12-13 NOTE — TELEPHONE ENCOUNTER
Patient will need her script for Estrogen to be sent to a different pharmacy. She would like it to be sent to [facility] inside [employer] in [place].

## 2024-12-14 DIAGNOSIS — E11.9 TYPE 2 DIABETES MELLITUS WITHOUT COMPLICATION, WITHOUT LONG-TERM CURRENT USE OF INSULIN (MULTI): ICD-10-CM

## 2024-12-16 RX ORDER — GLIPIZIDE 5 MG/1
5 TABLET ORAL
Qty: 60 TABLET | Refills: 0 | Status: SHIPPED | OUTPATIENT
Start: 2024-12-16

## 2024-12-16 NOTE — TELEPHONE ENCOUNTER
Recent Visits  No visits were found meeting these conditions.  Showing recent visits within past 180 days and meeting all other requirements  Future Appointments  Date Type Provider Dept   01/10/25 Appointment Mike Pierce DO Do [facility]   Showing future appointments within next 90 days and meeting all other requirements

## 2024-12-17 ENCOUNTER — APPOINTMENT (OUTPATIENT)
Dept: OBSTETRICS AND GYNECOLOGY | Facility: CLINIC | Age: 49
End: 2024-12-17
Payer: COMMERCIAL

## 2024-12-17 VITALS
BODY MASS INDEX: 28.37 KG/M2 | SYSTOLIC BLOOD PRESSURE: 119 MMHG | WEIGHT: 160.13 LBS | HEIGHT: 63 IN | DIASTOLIC BLOOD PRESSURE: 79 MMHG

## 2024-12-17 DIAGNOSIS — N81.9 FEMALE GENITAL PROLAPSE, UNSPECIFIED TYPE: Primary | ICD-10-CM

## 2024-12-17 DIAGNOSIS — N95.1 VASOMOTOR SYMPTOMS DUE TO MENOPAUSE: ICD-10-CM

## 2024-12-17 PROCEDURE — 3062F POS MACROALBUMINURIA REV: CPT | Performed by: STUDENT IN AN ORGANIZED HEALTH CARE EDUCATION/TRAINING PROGRAM

## 2024-12-17 PROCEDURE — 3051F HG A1C>EQUAL 7.0%<8.0%: CPT | Performed by: STUDENT IN AN ORGANIZED HEALTH CARE EDUCATION/TRAINING PROGRAM

## 2024-12-17 PROCEDURE — 3078F DIAST BP <80 MM HG: CPT | Performed by: STUDENT IN AN ORGANIZED HEALTH CARE EDUCATION/TRAINING PROGRAM

## 2024-12-17 PROCEDURE — 1036F TOBACCO NON-USER: CPT | Performed by: STUDENT IN AN ORGANIZED HEALTH CARE EDUCATION/TRAINING PROGRAM

## 2024-12-17 PROCEDURE — 3050F LDL-C >= 130 MG/DL: CPT | Performed by: STUDENT IN AN ORGANIZED HEALTH CARE EDUCATION/TRAINING PROGRAM

## 2024-12-17 PROCEDURE — 99203 OFFICE O/P NEW LOW 30 MIN: CPT | Performed by: STUDENT IN AN ORGANIZED HEALTH CARE EDUCATION/TRAINING PROGRAM

## 2024-12-17 PROCEDURE — 3008F BODY MASS INDEX DOCD: CPT | Performed by: STUDENT IN AN ORGANIZED HEALTH CARE EDUCATION/TRAINING PROGRAM

## 2024-12-17 PROCEDURE — 3074F SYST BP LT 130 MM HG: CPT | Performed by: STUDENT IN AN ORGANIZED HEALTH CARE EDUCATION/TRAINING PROGRAM

## 2024-12-17 RX ORDER — ESTRADIOL 0.5 MG/1
0.5 TABLET ORAL DAILY
Qty: 90 TABLET | Refills: 3 | Status: SHIPPED | OUTPATIENT
Start: 2024-12-17 | End: 2025-12-17

## 2024-12-17 NOTE — PROGRESS NOTES
"Subjective   Patient ID [id number]   Darcy Kim \"Darcy Nelson" is a 49 y.o. who presents today for hot flashes and concern for prolapse. In , she underwent TVH with TVT and bladder mesh. She then underwent sling lysis with removal of right arm of mesh in 2016 with Dr. Howell. She notices a bulge particularly with wiping. She occasionally leaks urine when jumping on trampoline with her kids but denies incontinence with cough, laugh, or sneeze. She is sexually active with one male partner and uses a lubricant for dryness. She has been on HT for two years with excellent control of hot flashes.     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 Anterior and posterior prolapse with valsalva not past introitus  Neuro: alert and oriented  Psych: appropriate affect     Assessment/Plan     Darcy Kim \"Darcy Nelson" is a 49 y.o. who presents today for hot flashes and pelvic organ prolapse.     Vasomotor Symptoms of Menopause  - Has been well-controlled on HT for the past two years  - Discussed that HT is the first line treatment for vasomotor syndrome and has also been shown to prevent bone loss. No contraindications to use. 10 year CVD risk calculated to be 3.3%. Did review risks of HT including VTE, stroke, CVD, and breast cancer. Also discussed lowest possible dose for shortest duration of time  - Currently Rx'ed estratest by PCP with 0.625mg estradiol. Discussed recommendation to discontinue testosterone. Also discussed decreasing dose of estradiol to which patient is amenable. To d/c estratest and " start PO estradiol 0.5mg daily. Did also discuss patch formulation which may have reduced risk of VTE/stroke. Patient overall low risk and prefers PO which is quite reasonable. Is s/p hyst so no need for endometrial protection  -To call office if hot flashes not well-controlled on estradiol 0.5mg daily    Pelvic Organ Prolapse  -s/p TVH with TVT and bladder mesh that was then revised by Dr. Howell  -Anterior and posterior prolapse not past level of introitus on exam today  -Discussed options for management including PFPT, pessary, and surgery. To refer back to Dr. Howell for further diagnosis and management    Follow up in 1 year or sooner PRN.    Madhavi Marte MD

## 2024-12-20 ENCOUNTER — APPOINTMENT (OUTPATIENT)
Dept: PHARMACY | Facility: HOSPITAL | Age: 49
End: 2024-12-20
Payer: COMMERCIAL

## 2025-01-08 ENCOUNTER — HOSPITAL ENCOUNTER (EMERGENCY)
Age: 50
Discharge: HOME | End: 2025-01-08
Payer: COMMERCIAL

## 2025-01-08 VITALS
RESPIRATION RATE: 16 BRPM | WEIGHT: 160 LBS | SYSTOLIC BLOOD PRESSURE: 119 MMHG | DIASTOLIC BLOOD PRESSURE: 85 MMHG | TEMPERATURE: 98.4 F | OXYGEN SATURATION: 98 % | BODY MASS INDEX: 29.44 KG/M2 | HEIGHT: 62 IN | HEART RATE: 94 BPM

## 2025-01-08 DIAGNOSIS — H60.391 INFECTIVE OTITIS EXTERNA OF RIGHT EAR: Primary | ICD-10-CM

## 2025-01-08 DIAGNOSIS — E11.9 TYPE 2 DIABETES MELLITUS WITHOUT COMPLICATION, WITHOUT LONG-TERM CURRENT USE OF INSULIN (MULTI): ICD-10-CM

## 2025-01-08 PROCEDURE — 99283 EMERGENCY DEPT VISIT LOW MDM: CPT

## 2025-01-08 RX ORDER — GLIPIZIDE 5 MG/1
5 TABLET ORAL
Qty: 60 TABLET | Refills: 0 | Status: SHIPPED | OUTPATIENT
Start: 2025-01-08

## 2025-01-08 RX ORDER — NEOMYCIN SULFATE, POLYMYXIN B SULFATE, HYDROCORTISONE 3.5; 10000; 1 MG/ML; [USP'U]/ML; MG/ML
2 SOLUTION/ DROPS AURICULAR (OTIC) EVERY 8 HOURS SCHEDULED
Qty: 3 ML | Refills: 0 | Status: SHIPPED | OUTPATIENT
Start: 2025-01-08 | End: 2025-01-15

## 2025-01-08 ASSESSMENT — ENCOUNTER SYMPTOMS
EYES NEGATIVE: 1
SORE THROAT: 0
RESPIRATORY NEGATIVE: 1

## 2025-01-08 ASSESSMENT — PAIN DESCRIPTION - ORIENTATION: ORIENTATION: RIGHT

## 2025-01-08 ASSESSMENT — PAIN DESCRIPTION - DESCRIPTORS: DESCRIPTORS: ACHING

## 2025-01-08 ASSESSMENT — PAIN DESCRIPTION - PAIN TYPE: TYPE: ACUTE PAIN

## 2025-01-08 ASSESSMENT — PAIN SCALES - GENERAL: PAINLEVEL_OUTOF10: 9

## 2025-01-08 ASSESSMENT — PAIN - FUNCTIONAL ASSESSMENT: PAIN_FUNCTIONAL_ASSESSMENT: 0-10

## 2025-01-08 ASSESSMENT — PAIN DESCRIPTION - LOCATION: LOCATION: EAR

## 2025-01-08 NOTE — TELEPHONE ENCOUNTER
Recent Visits  Date Type Provider Dept   05/17/24 Office Visit Mike Pierce DO Do [facility]   Showing recent visits within past 365 days and meeting all other requirements  Future Appointments  Date Type Provider Dept   01/31/25 Appointment Mike Pierce DO Do [facility]   Showing future appointments within next 90 days and meeting all other requirements

## 2025-01-09 ENCOUNTER — PHARMACY VISIT (OUTPATIENT)
Dept: PHARMACY | Facility: CLINIC | Age: 50
End: 2025-01-09
Payer: COMMERCIAL

## 2025-01-09 ENCOUNTER — TELEMEDICINE (OUTPATIENT)
Dept: PRIMARY CARE | Facility: CLINIC | Age: 50
End: 2025-01-09
Payer: COMMERCIAL

## 2025-01-09 DIAGNOSIS — H66.91 ACUTE INFECTION OF RIGHT EAR: Primary | ICD-10-CM

## 2025-01-09 DIAGNOSIS — H61.891 SWELLING OF RIGHT EXTERNAL EAR: ICD-10-CM

## 2025-01-09 DIAGNOSIS — H92.01 OTALGIA, RIGHT EAR: ICD-10-CM

## 2025-01-09 PROCEDURE — RXMED WILLOW AMBULATORY MEDICATION CHARGE

## 2025-01-09 RX ORDER — DOXYCYCLINE 100 MG/1
100 CAPSULE ORAL 2 TIMES DAILY
Qty: 20 CAPSULE | Refills: 0 | Status: SHIPPED | OUTPATIENT
Start: 2025-01-09 | End: 2025-01-19

## 2025-01-09 RX ORDER — NEOMYCIN SULFATE, POLYMYXIN B SULFATE, HYDROCORTISONE 3.5; 10000; 1 MG/ML; [USP'U]/ML; MG/ML
3 SOLUTION/ DROPS AURICULAR (OTIC) 4 TIMES DAILY
COMMUNITY
Start: 2025-01-08 | End: 2025-01-15

## 2025-01-09 RX ORDER — PREDNISONE 10 MG/1
TABLET ORAL
Qty: 30 TABLET | Refills: 0 | Status: SHIPPED | OUTPATIENT
Start: 2025-01-09 | End: 2025-01-19

## 2025-01-09 RX ORDER — DICLOFENAC SODIUM 75 MG/1
75 TABLET, DELAYED RELEASE ORAL 2 TIMES DAILY PRN
Qty: 60 TABLET | Refills: 0 | Status: SHIPPED | OUTPATIENT
Start: 2025-01-09 | End: 2025-03-10

## 2025-01-09 RX ORDER — CELECOXIB 200 MG/1
200 CAPSULE ORAL EVERY 12 HOURS PRN
Qty: 60 CAPSULE | Refills: 0 | Status: SHIPPED | OUTPATIENT
Start: 2025-01-09 | End: 2025-02-08

## 2025-01-09 ASSESSMENT — PATIENT HEALTH QUESTIONNAIRE - PHQ9
1. LITTLE INTEREST OR PLEASURE IN DOING THINGS: NOT AT ALL
SUM OF ALL RESPONSES TO PHQ9 QUESTIONS 1 AND 2: 0
2. FEELING DOWN, DEPRESSED OR HOPELESS: NOT AT ALL

## 2025-01-09 NOTE — ED PROVIDER NOTES
Vitals:    01/08/25 1853   BP: 119/85   Pulse: 94   Resp: 16   Temp: 98.4 °F (36.9 °C)   TempSrc: Oral   SpO2: 98%   Weight: 72.6 kg (160 lb)   Height: 1.575 m (5' 2\")            Selena Mendoza is a 49 y.o. female who presents to the Emergency Department with right ear pain ongoing for 2 weeks reduced hearing.  Gradually worsening.  On examination ear canal is edematous TM partially visible no injection or erythremia.  Small amount of white crusting discharge in the ear canal.  Patient has sharp tragal tenderness denies mastoid tenderness.  Neck supple without masses.  Orders for Cortisporin drops for right otitis externa.  Advised follow-up with PCP.      LABS:  Labs Reviewed - No data to display      PROCEDURES:  Unless otherwise noted below, none     Procedures    My attending is: Dr Yin       FINAL IMPRESSION      1. Infective otitis externa of right ear          DISPOSITION/PLAN   DISPOSITION Decision To Discharge 01/08/2025 07:14:15 PM   DISPOSITION CONDITION Stable               NICOLLE Kinney CNP (electronically signed)  Attending Emergency Physician      Roann Humphreys APRN - CNP  01/08/25 [id number]

## 2025-01-09 NOTE — PROGRESS NOTES
Virtual or Telephone Consent    A telephone visit (audio only) between the patient (at the originating site) and the provider (at the distant site) was utilized to provide this telehealth service.   Verbal consent was requested and obtained from Darcy Kim on this date, 01/09/25 for a telehealth visit.     Subjective   Patient ID: Darcy Kim is a 49 y.o. female who is with complaints of pain and tenderness of the right ear.    HPI   Patient is a 49 y.o. female who CONSULTED AT [facility] VIRTUAL CLINIC - PHONE ONLY today. Patient is with complaints of pain and tenderness of the right ear. Patient states condition started about 2 weeks ago. This was accompanied by pain tenderness and pain when tagging on the earlobe.  She denies ear discharge, bleeding from ears, tinnitus, nor vertigo. She denies trauma nor foreign body. She states that she took some OTC pain medication which afforded only slight relief of symptoms. She denies fever, chills, nasal congestion, nasal discharge, shortness of breath, nor chest pain. She was seen at the ED yesterday and was started on Neomycin ear drop.    Review of Systems  General: no weight loss, generally healthy, no fatigue  Head:  no headaches / sinus pain, no vertigo, no injury  Eyes: no diplopia, no tearing, no pain,   Ears: (+) right ear pain, no tinnitus, no bleeding, no vertigo  Mouth:  no dental difficulties, no gingival bleeding, no sore throat, no loss of sense of taste  Nose: no congestion, no  discharge, no bleeding, no obstruction, no loss of sense of smell  Neck: no stiffness, no pain, no tenderness, no masses, no bruit  Pulmonary: no dyspnea, no wheezing, no hemoptysis, no cough  Cardiovascular: no chest pain, no palpitations, no syncope, no orthopnea  Gastrointestinal: no change in appetite, no dysphagia, no abdominal pains, no diarrhea, no emesis, no melena  Genito Urinary: no dysuria, no urinary urgency, no nocturia, no incontinence, no change in  nature of urine  Musculoskeletal: no muscle ache, no joint pain, no limitation of range of motion, no paresthesia, no numbness  Constitutional: no fever, no chills, no night sweats    Objective   NO VITAL SIGNS TAKEN FOR THIS PATIENT (VIRTUAL VISIT CONSULT - PHONE ONLY)    Physical Exam  PHYSICAL EXAMINATION WAS NOT DONE FOR THIS PATIENT (VIRTUAL VISIT CONSULT - PHONE ONLY)    Assessment/Plan   Problem List Items Addressed This Visit    None  Visit Diagnoses         Codes    Acute infection of right ear    -  Primary H66.91    Relevant Medications    doxycycline (Vibramycin) 100 mg capsule    predniSONE (Deltasone) 10 mg tablet    diclofenac (Voltaren) 75 mg EC tablet    celecoxib (CeleBREX) 200 mg capsule    Otalgia, right ear     H92.01    Relevant Medications    doxycycline (Vibramycin) 100 mg capsule    predniSONE (Deltasone) 10 mg tablet    diclofenac (Voltaren) 75 mg EC tablet    celecoxib (CeleBREX) 200 mg capsule    Swelling of right external ear     H61.891    Relevant Medications    doxycycline (Vibramycin) 100 mg capsule    predniSONE (Deltasone) 10 mg tablet    diclofenac (Voltaren) 75 mg EC tablet    celecoxib (CeleBREX) 200 mg capsule        DISCHARGE SUMMARY:   Diagnosis, treatment, treatment options, and possible complications of today's illness discussed and explained to patient. Patient to take medication/s associated with this visit. Patient may also take OTC analgesic/antipyretic if needed for pain/fever. Advised to avoid ear manipulation / cleaning. Advised to avoid submerging on water. Advised to increase oral fluid intake. Advised to come back if with worsening or persistent symptoms. Patient verbalized understanding of plan of care.    Patient to come back in 7 - 10 days if needed for worsening symptoms.

## 2025-01-09 NOTE — PROGRESS NOTES
Subjective   Patient ID: Darcy Kim is a 49 y.o. female who presents for ear ache        Symptoms: right ear pain  swollen lymph nodes.  Length of symptoms: 3 weeks ago  OTC:  tylenol with   mild help.  Related information:    HPI     Review of Systems    Objective   LMP  (LMP Unknown)     Physical Exam    Assessment/Plan

## 2025-01-09 NOTE — ED TRIAGE NOTES
Patient to ED due to ear pain x2 weeks. Patient states she recently had a ear infection in her left ear. Patient is alert and oriented x4. No outward signs of acute distress noted.

## 2025-01-10 ENCOUNTER — APPOINTMENT (OUTPATIENT)
Dept: PRIMARY CARE | Facility: CLINIC | Age: 50
End: 2025-01-10
Payer: COMMERCIAL

## 2025-01-28 ENCOUNTER — PHARMACY VISIT (OUTPATIENT)
Dept: PHARMACY | Facility: CLINIC | Age: 50
End: 2025-01-28
Payer: COMMERCIAL

## 2025-01-28 PROCEDURE — RXMED WILLOW AMBULATORY MEDICATION CHARGE

## 2025-01-30 ENCOUNTER — APPOINTMENT (OUTPATIENT)
Dept: UROLOGY | Facility: CLINIC | Age: 50
End: 2025-01-30
Payer: COMMERCIAL

## 2025-01-30 VITALS
DIASTOLIC BLOOD PRESSURE: 77 MMHG | BODY MASS INDEX: 28.74 KG/M2 | WEIGHT: 162.2 LBS | SYSTOLIC BLOOD PRESSURE: 124 MMHG | HEIGHT: 63 IN | HEART RATE: 96 BPM

## 2025-01-30 DIAGNOSIS — N81.9 FEMALE GENITAL PROLAPSE, UNSPECIFIED TYPE: ICD-10-CM

## 2025-01-30 DIAGNOSIS — N81.10 VAGINAL PROLAPSE: ICD-10-CM

## 2025-01-30 DIAGNOSIS — N39.3 SUI (STRESS URINARY INCONTINENCE, FEMALE): Primary | ICD-10-CM

## 2025-01-30 LAB
POC APPEARANCE, URINE: CLEAR
POC BILIRUBIN, URINE: NEGATIVE
POC BLOOD, URINE: NEGATIVE
POC COLOR, URINE: YELLOW
POC GLUCOSE, URINE: ABNORMAL MG/DL
POC KETONES, URINE: NEGATIVE MG/DL
POC LEUKOCYTES, URINE: NEGATIVE
POC NITRITE,URINE: NEGATIVE
POC PH, URINE: 5 PH
POC PROTEIN, URINE: NEGATIVE MG/DL
POC SPECIFIC GRAVITY, URINE: 1.02
POC UROBILINOGEN, URINE: 0.2 EU/DL

## 2025-01-30 PROCEDURE — 3078F DIAST BP <80 MM HG: CPT | Performed by: UROLOGY

## 2025-01-30 PROCEDURE — 81003 URINALYSIS AUTO W/O SCOPE: CPT | Performed by: UROLOGY

## 2025-01-30 PROCEDURE — 3008F BODY MASS INDEX DOCD: CPT | Performed by: UROLOGY

## 2025-01-30 PROCEDURE — 3074F SYST BP LT 130 MM HG: CPT | Performed by: UROLOGY

## 2025-01-30 PROCEDURE — 99204 OFFICE O/P NEW MOD 45 MIN: CPT | Performed by: UROLOGY

## 2025-01-30 PROCEDURE — 51798 US URINE CAPACITY MEASURE: CPT | Performed by: UROLOGY

## 2025-01-30 NOTE — PROGRESS NOTES
"Referred by: Dr. Madhavi Marte MD / PCP  Mike Pierce DO         CHIEF COMPLAINT:  prolapse         HISTORY OF PRESENT ILLNESS:  This is a  49 y.o. female, No obstetric history on file. who presents with prolapse.    The following were reviewed to gain additional history:  External notes: 12/17/24 Dr. Marte  \"Pelvic Organ Prolapse  -s/p TVH with TVT and bladder mesh that was then revised by Dr. Howell  -Anterior and posterior prolapse not past level of introitus on exam today  -Discussed options for management including PFPT, pessary, and surgery. To refer back to Dr. Howell for further diagnosis and management\"    Past medical history: T2DM, knee arthritis   Past surgical history: Gallbladder in 2000. Had TVH and bladder sling in 2016 (Dr. Wyatt) and then immediately after had pain. Dr. Howell did mesh excision in 2016. Then had hernia surgery the same year. Bilateral knee surgery.         Specifically, she describes the following pelvic floor symptoms:          Prolapse: Yes, has noticed for 1.5-2 years, been the same over that time, can feel it. 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       - Splinting: Yes, for bowel movements              Incontinence:  Yes              Has had leaking urine with sex a few times since prolapse started. Sometimes will leak depending on on full bladder is when she gets upset and is yelling. Rarely with sneezing and a full bladder. Does not need to wear pads.              Urinary Symptoms: frequency: 6-7 voids and nocturia: rarely will get up once to voids if she didn't go before bed. Denies urgency, hematuria, or dysuria.       - Fluid intake: drinks a lot of water, 2 cups of coffee in the morning, no soda or tea          History: none. No stones, kidney disease, or family history of renal cancer.                  Bowel Symptoms: constipation has had hemorrhoids banded in the past.        OB/Gyn History:  - Menopausal: Yes           " "Postmenopausal bleeding: No  - HRT: Yes - estrace  - Pap up to date: Yes - s/p TVH \"because she didn't want kids and leaking urine\"   History of abnormal pap: Yes - since she was young, but they would do additional testing and she was told it was fine  - Sexually active:  Yes  Dyspareunia: No   Other issues: none  -   OB History    No obstetric history on file.       - Number of prior vaginal deliveries: 5   Number of prior operative deliveries: forceps for first baby 8 lbs 9.5 oz   Prior OASI? Thinks it was a 3rd or a 4th  - Number of prior c-sections: 0    - Mammogram up to date: Yes - December 2024  - Colonoscopy up to date: No      Dr. Howell did chart review in old  system:  - Saw Dr. Howell on 6/9/2016. Had TVH, TVT with Dr. Wyatt, developed pain with intercourse. Had right ileo-inguinal neuropathy, injected with lidocaine and did urodynamic testing. Also had myofascial pelvic pain, referred to physical therapy.  - Urodynamics showed obstruction.  - Sling lysis under urethra and excision of right arm of mesh of sling on 9/7/2016 and her pain resolved.    PHYSICAL EXAMINATION:  No LMP recorded (lmp unknown). Patient has had a hysterectomy.  Body mass index is 29.19 kg/m².  ,  Vitals:    01/30/25 0851   BP: 124/77   Pulse: 96       General Appearance: well appearing  Neuro: Alert and oriented     Pelvic:  Genitourinary: normal external genitalia, Bartholin's glands negative, Bad Axe's glands negative  Urethra: normal meatus, non-tender, no periurethral mass  Vaginal mucosa scar on posterior vaginal wall (prior episiotomy), no ulcerations or lesions  Cervix surgically absent  Uterus surgically absent  Atrophy positive    CST positive with prolapse reduced    POP-Q (in supine position):       Aa +0.5     Ba +0.5     C -4              gh 4     pb 4     tvl 8              Ap +0.5     Bp +0.5     D x    PVR (by Ultrasound): 0 mL   Urine dip:   Results for orders placed or performed in visit on 01/30/25 (from the past " 24 hours)   POCT UA Automated manually resulted   Result Value Ref Range    POC Color, Urine Yellow Straw, Yellow, Light-Yellow    POC Appearance, Urine Clear Clear    POC Glucose, Urine 500 (3+) (A) NEGATIVE mg/dl    POC Bilirubin, Urine NEGATIVE NEGATIVE    POC Ketones, Urine NEGATIVE NEGATIVE mg/dl    POC Specific Gravity, Urine 1.020 1.005 - 1.035    POC Blood, Urine NEGATIVE NEGATIVE    POC PH, Urine 5.0 No Reference Range Established PH    POC Protein, Urine NEGATIVE NEGATIVE mg/dl    POC Urobilinogen, Urine 0.2 0.2, 1.0 EU/DL    Poc Nitrite, Urine NEGATIVE NEGATIVE    POC Leukocytes, Urine NEGATIVE NEGATIVE         IMPRESSION AND PLAN:  Darcy Kim is a 49 y.o. who presents with stage 2 vaginal prolapse and stress urinary incontinence.    - Discussed management options including expectant management, pessary, and surgical management  - Discussed risks/benefits/alternatives to SSLF, APR, and perineoplasty and risk of recurrence as high as 40% at 5 years, but reoperation rate around 10%. Also discussed RA-SCP with mesh, APR, and perineoplasty and risk of recurrence at 7 years around 20% and reoperation rate around 5%.  - Patient would like to proceed with vaginal approach without mesh (SSLF, APR, perineoplasty) given her past experience with mesh.  - Given handouts on all surgical options  - Also discussed treatment for ADRIANE including mid-urethral sling and Bulkamid. Discussed risks/benefits/alternatives to both.  - Will proceed with urodynamic testing prior to surgery.    Follow up for urodynamic testing, then follow up after urodynamic testing.    Patient seen and discussed with Dr. Howell. All questions and concerns were answered and addressed.  The patient expressed understanding and agrees with the plan.     Ida Montero MD, OSCAR, BSN  URPS Fellow, PGY-5    Jac Howell MD  [unfilled]    1/30/2025       I saw and evaluated the patient. I personally obtained the key and critical portions of the  history and physical exam or was physically present for key and critical portions performed by the resident/fellow. I reviewed the resident/fellow's documentation and discussed the patient with the resident/fellow. I agree with the resident/fellow's medical decision making as documented in the note.   Jac Howell MD

## 2025-01-31 ENCOUNTER — APPOINTMENT (OUTPATIENT)
Dept: PRIMARY CARE | Facility: CLINIC | Age: 50
End: 2025-01-31
Payer: COMMERCIAL

## 2025-01-31 VITALS
HEART RATE: 97 BPM | SYSTOLIC BLOOD PRESSURE: 128 MMHG | DIASTOLIC BLOOD PRESSURE: 78 MMHG | OXYGEN SATURATION: 100 % | BODY MASS INDEX: 28.8 KG/M2 | WEIGHT: 160 LBS

## 2025-01-31 DIAGNOSIS — E78.5 DYSLIPIDEMIA: ICD-10-CM

## 2025-01-31 DIAGNOSIS — E11.9 TYPE 2 DIABETES MELLITUS WITHOUT COMPLICATION, WITHOUT LONG-TERM CURRENT USE OF INSULIN (MULTI): Primary | ICD-10-CM

## 2025-01-31 DIAGNOSIS — Z12.11 SCREENING FOR COLON CANCER: ICD-10-CM

## 2025-01-31 DIAGNOSIS — F41.9 ANXIETY: ICD-10-CM

## 2025-01-31 DIAGNOSIS — I10 PRIMARY HYPERTENSION: ICD-10-CM

## 2025-01-31 PROCEDURE — 99213 OFFICE O/P EST LOW 20 MIN: CPT | Performed by: FAMILY MEDICINE

## 2025-01-31 RX ORDER — GLIPIZIDE 5 MG/1
5 TABLET ORAL
Qty: 60 TABLET | Refills: 2 | Status: SHIPPED | OUTPATIENT
Start: 2025-01-31

## 2025-01-31 ASSESSMENT — ENCOUNTER SYMPTOMS
BLOOD IN STOOL: 0
APPETITE CHANGE: 0
NECK STIFFNESS: 0
DYSURIA: 0
SEIZURES: 0
EYE PAIN: 0
DEPRESSION: 0
PALPITATIONS: 0
POLYPHAGIA: 0
CONSTIPATION: 0
DIARRHEA: 0
HEMATURIA: 0
FATIGUE: 0
RECTAL PAIN: 0
OCCASIONAL FEELINGS OF UNSTEADINESS: 0
ABDOMINAL DISTENTION: 0
CONSTITUTIONAL NEGATIVE: 1
DIZZINESS: 0
LOSS OF SENSATION IN FEET: 0
FEVER: 0
SINUS PAIN: 0
POLYDIPSIA: 0
ADENOPATHY: 0
SHORTNESS OF BREATH: 0
STRIDOR: 0
ACTIVITY CHANGE: 0
TROUBLE SWALLOWING: 0
COLOR CHANGE: 0
ARTHRALGIAS: 0
DECREASED CONCENTRATION: 0
SLEEP DISTURBANCE: 0
DYSPHORIC MOOD: 0
HEADACHES: 0
COUGH: 0
FLANK PAIN: 0
ABDOMINAL PAIN: 0
PHOTOPHOBIA: 0
CONFUSION: 0
MYALGIAS: 0
SINUS PRESSURE: 0
AGITATION: 0
SPEECH DIFFICULTY: 0
CHEST TIGHTNESS: 0
RHINORRHEA: 0
SORE THROAT: 0

## 2025-01-31 ASSESSMENT — PATIENT HEALTH QUESTIONNAIRE - PHQ9
1. LITTLE INTEREST OR PLEASURE IN DOING THINGS: NOT AT ALL
2. FEELING DOWN, DEPRESSED OR HOPELESS: NOT AT ALL
SUM OF ALL RESPONSES TO PHQ9 QUESTIONS 1 AND 2: 0

## 2025-01-31 NOTE — PROGRESS NOTES
Subjective   Patient ID: Darcy Kim is a 49 y.o. female who presents for Diabetes.    HPI   Pt presents today for DM she is currently taking Trulicity,Jardiance, Glipizide and states she does not check her numbers at home.  Pt states her A1C was 7.0 on 01/27/25.    States she saw urologist Dr. Howell and was told she has vaginal prolapse. Patient plans on proceeding with SSLF, APR, perineoplasty without mesh.    Pt denies any other concerns today.    Review of Systems   Constitutional: Negative.  Negative for activity change, appetite change, fatigue and fever.   HENT:  Negative for congestion, dental problem, ear discharge, ear pain, mouth sores, rhinorrhea, sinus pressure, sinus pain, sore throat, tinnitus and trouble swallowing.    Eyes:  Negative for photophobia, pain and visual disturbance.   Respiratory:  Negative for cough, chest tightness, shortness of breath and stridor.    Cardiovascular:  Negative for chest pain and palpitations.   Gastrointestinal:  Negative for abdominal distention, abdominal pain, blood in stool, constipation, diarrhea and rectal pain.   Endocrine: Negative for cold intolerance, heat intolerance, polydipsia, polyphagia and polyuria.   Genitourinary:  Negative for dysuria, flank pain, hematuria and urgency.   Musculoskeletal:  Negative for arthralgias, gait problem, myalgias and neck stiffness.   Skin:  Negative for color change and rash.   Allergic/Immunologic: Negative for environmental allergies and food allergies.   Neurological:  Negative for dizziness, seizures, syncope, speech difficulty and headaches.   Hematological:  Negative for adenopathy.   Psychiatric/Behavioral:  Negative for agitation, confusion, decreased concentration, dysphoric mood and sleep disturbance. The patient is nervous/anxious.        Objective   /78 (BP Location: Left arm, Patient Position: Sitting, BP Cuff Size: Adult)   Pulse 97   Wt 72.6 kg (160 lb)   LMP  (LMP Unknown)   SpO2 100%    BMI 28.80 kg/m²     Physical Exam  Vitals reviewed.   Constitutional:       General: She is not in acute distress.     Appearance: Normal appearance. She is normal weight. She is not ill-appearing or diaphoretic.   HENT:      Head: Normocephalic.      Right Ear: Tympanic membrane and external ear normal.      Left Ear: Tympanic membrane and external ear normal.      Nose: Nose normal. No congestion.      Mouth/Throat:      Pharynx: No posterior oropharyngeal erythema.   Eyes:      General:         Right eye: No discharge.         Left eye: No discharge.      Extraocular Movements: Extraocular movements intact.      Conjunctiva/sclera: Conjunctivae normal.      Pupils: Pupils are equal, round, and reactive to light.   Cardiovascular:      Rate and Rhythm: Normal rate and regular rhythm.      Pulses: Normal pulses.      Heart sounds: Normal heart sounds. No murmur heard.  Pulmonary:      Effort: Pulmonary effort is normal. No respiratory distress.      Breath sounds: Normal breath sounds. No wheezing or rales.   Chest:      Chest wall: No tenderness.   Abdominal:      General: Abdomen is flat. Bowel sounds are normal. There is no distension.      Palpations: There is no mass.      Tenderness: There is no abdominal tenderness. There is no guarding.   Musculoskeletal:         General: No tenderness. Normal range of motion.      Cervical back: Normal range of motion and neck supple. No tenderness.      Right lower leg: No edema.      Left lower leg: No edema.   Skin:     General: Skin is dry.      Coloration: Skin is not jaundiced.      Findings: No bruising, erythema or rash.   Neurological:      General: No focal deficit present.      Mental Status: She is alert and oriented to person, place, and time. Mental status is at baseline.      Cranial Nerves: No cranial nerve deficit.      Sensory: No sensory deficit.      Coordination: Coordination normal.      Gait: Gait normal.   Psychiatric:         Thought Content:  Thought content normal.         Judgment: Judgment normal.      Comments: Anxious         Assessment/Plan   Problem List Items Addressed This Visit             ICD-10-CM    Anxiety F41.9    Dyslipidemia E78.5    Type 2 diabetes mellitus without complication, without long-term current use of insulin (Multi) - Primary E11.9    Relevant Medications    dulaglutide (Trulicity) 3 mg/0.5 mL injection    empagliflozin (Jardiance) 25 mg    glipiZIDE (Glucotrol) 5 mg tablet    Primary hypertension I10     Other Visit Diagnoses         Codes    Screening for colon cancer     Z12.11    Relevant Orders    Colonoscopy Screening; Average Risk Patient          Scribe Attestation  By signing my name below, I, Kanwal Wyatt MA , Scribe   attest that this documentation has been prepared under the direction and in the presence of Mike Pierce DO.    Provider Attestation - Scribe documentation    All medical record entries made by the Scribe were at my direction and personally dictated by me. I have reviewed the chart and agree that the record accurately reflects my personal performance of the history, physical exam, discussion and plan.

## 2025-01-31 NOTE — PATIENT INSTRUCTIONS
Follow up in 4 months    Continue current medications and therapy for chronic medical conditions.    Patient was advised importance of proper diet/nutrition in addition adequate hydration. Patient was encouraged moderate exercise program to include 30 minutes daily for 5 days of the week or 150 minutes weekly. Patient will follow-up with us as scheduled.    Colonoscopy ordered

## 2025-02-02 ASSESSMENT — ENCOUNTER SYMPTOMS: NERVOUS/ANXIOUS: 1

## 2025-02-07 ENCOUNTER — APPOINTMENT (OUTPATIENT)
Dept: UROLOGY | Facility: CLINIC | Age: 50
End: 2025-02-07
Payer: COMMERCIAL

## 2025-02-07 DIAGNOSIS — R32 URINARY INCONTINENCE, UNSPECIFIED TYPE: ICD-10-CM

## 2025-02-07 DIAGNOSIS — N81.9 FEMALE GENITAL PROLAPSE, UNSPECIFIED TYPE: ICD-10-CM

## 2025-02-07 PROCEDURE — 51784 ANAL/URINARY MUSCLE STUDY: CPT | Performed by: UROLOGY

## 2025-02-07 PROCEDURE — 51797 INTRAABDOMINAL PRESSURE TEST: CPT | Performed by: UROLOGY

## 2025-02-07 PROCEDURE — 51741 ELECTRO-UROFLOWMETRY FIRST: CPT | Performed by: UROLOGY

## 2025-02-07 PROCEDURE — 51728 CYSTOMETROGRAM W/VP: CPT | Performed by: UROLOGY

## 2025-02-07 NOTE — PROGRESS NOTES
Darcy Kim 49 y.o. female  Procedures:  Patient referred by Dr. Howell for urodynamics to evaluate urinary incontinence and female genital prolapse. Dr. Howell was present in office at time of study. Pre-uroflow was completed today with a pvr of 100 ml. Urine was clear for uti today. Patient did leak on CLPP/VLPP. Patient had CLPP w/swab reduced. Patient did have DO w/w/o leak during study. Pvr after study was 0 ml.  Patient instructed to increase fluids if she has any burning or blood in urine. Patient made aware she may have vaginally/blood rectally due to swab/abdominal catheter insertion.  Patient understood and consented to urodynamics. Patient will follow up with Dr. Howell to review results. 02/07/2025/paul

## 2025-02-10 ENCOUNTER — APPOINTMENT (OUTPATIENT)
Dept: UROLOGY | Facility: CLINIC | Age: 50
End: 2025-02-10
Payer: COMMERCIAL

## 2025-02-10 DIAGNOSIS — N81.10 VAGINAL PROLAPSE: Primary | ICD-10-CM

## 2025-02-10 DIAGNOSIS — N39.41 URGE INCONTINENCE: ICD-10-CM

## 2025-02-10 PROCEDURE — 99214 OFFICE O/P EST MOD 30 MIN: CPT | Performed by: UROLOGY

## 2025-02-10 NOTE — PROGRESS NOTES
CHIEF COMPLAINT:    An interactive audio and video telecommunication system which permits real time communications between the patient (at the originating site) and provider (at the distant site) was utilized to provide this telehealth service.    Verbal consent was requested and obtained fromNAME@ on this date,DATE@ , for a telehealth visit.       HISTORY OF PRESENT ILLNESS:    This is a  49 y.o. female who presents for follow-up for urodynamics review.  On uroflow patient voided a total of 179 cc with a peak flow 41 cc and a continuous bell-shaped curve.  She left a residual of 100 cc in her bladder.  On CMG, patient had the first desire to void at 27 cc, strong desire at 159 cc, capacity of 303 cc.  There was evidence of detrusor overactivity throughout the filling phase.  She did also have evidence of cough induced detrusor overactivity up to 42 cm of water at bladder volume of 200 cc with leakage of around 68 cc of infused volume.  She did also have stress incontinence following that with no detrusor overactivity with leak point pressure of 138 cm of water.  At capacity she was given the permission to void.  She voided 272 cc with a peak flow of 31 cc/s and detrusor pressure maximum flow 14 cm of water emptying her bladder completely.    This urodynamic shows evidence of detrusor overactivity during storage and cough induced detrusor overactivity with leakage.  There was evidence of stress incontinence with no detrusor overactivity later during the filling phase.  Patient empties her bladder completely with reduction of the prolapse.    Extract from last note  Darcy Kim is a 49 y.o. who presents with stage 2 vaginal prolapse and stress urinary incontinence.    - Discussed management options including expectant management, pessary, and surgical management  - Discussed risks/benefits/alternatives to SSLF, APR, and perineoplasty and risk of recurrence as high as 40% at 5 years, but reoperation rate around 10%.  Also discussed RA-SCP with mesh, APR, and perineoplasty and risk of recurrence at 7 years around 20% and reoperation rate around 5%.  - Patient would like to proceed with vaginal approach without mesh (SSLF, APR, perineoplasty) given her past experience with mesh.  - Given handouts on all surgical options  - Also discussed treatment for ADRIANE including mid-urethral sling and Bulkamid. Discussed risks/benefits/alternatives to both.  - Will proceed with urodynamic testing prior to surgery.    Follow up for urodynamic testing, then follow up after urodynamic testing.    Patient seen and discussed with Dr. Howell. All questions and concerns were answered and addressed.  The patient expressed understanding and agrees with the plan.      IMPRESSION AND PLAN:      Darcy Kim is a 49 y.o. who presents with stage II vaginal prolapse and urinary incontinence with stress clinically.  Patient has evidence of detrusor overactivity during storage and induced detrusor overactivity with leakage as well as stress incontinence.  Patient was counseled about the finding of urodynamics.  We discussed the of the management of uterovaginal prolapse as detailed in my previous note.  Patient is interested in sacrospinous ligament fixation anterior repair posterior repair and perineoplasty.  Patient inquired about time off work.  She would want to coordinate with her job prior to scheduling.  Patient does understand that her urodynamics confirmed improved emptying of her bladder with reduction of prolapse and it did confirm evidence of stress-induced detrusor overactivity with leakage.  We will evaluate her urinary incontinence status after reduction of the prolapse.  She might need medical management for the detrusor overactivity incontinence based on how she does following prolapse repair.      All questions and concerns were answered and addressed.  The patient expressed understanding and agrees with the plan.       ERVIN Nathan  MD Lynda  [phone]

## 2025-03-14 ENCOUNTER — APPOINTMENT (OUTPATIENT)
Dept: HEMATOLOGY/ONCOLOGY | Facility: CLINIC | Age: 50
End: 2025-03-14
Payer: COMMERCIAL

## 2025-04-08 ENCOUNTER — TELEPHONE (OUTPATIENT)
Dept: OBSTETRICS AND GYNECOLOGY | Facility: CLINIC | Age: 50
End: 2025-04-08
Payer: COMMERCIAL

## 2025-04-08 DIAGNOSIS — N95.1 VASOMOTOR SYMPTOMS DUE TO MENOPAUSE: ICD-10-CM

## 2025-04-08 PROCEDURE — RXMED WILLOW AMBULATORY MEDICATION CHARGE

## 2025-04-08 RX ORDER — ESTRADIOL 0.5 MG/1
1 TABLET ORAL DAILY
Qty: 180 TABLET | Refills: 3 | Status: SHIPPED | OUTPATIENT
Start: 2025-04-08 | End: 2026-04-08

## 2025-04-08 NOTE — TELEPHONE ENCOUNTER
Patient called stating that she was taken off of an estrogen combo and prescribed just estrogen in a lower dose. She stated that this dose is not working for her and was told to call if there was an issue. She stated that she is still experiencing achy joints and hot flashes and would like to know if the provider recommends a higher dose of estrogen. She also suggested a virtual visit if needed. Please assist. Thank you.

## 2025-04-14 ENCOUNTER — PHARMACY VISIT (OUTPATIENT)
Dept: PHARMACY | Facility: CLINIC | Age: 50
End: 2025-04-14
Payer: COMMERCIAL

## 2025-05-07 PROCEDURE — RXMED WILLOW AMBULATORY MEDICATION CHARGE

## 2025-05-08 DIAGNOSIS — E11.9 TYPE 2 DIABETES MELLITUS WITHOUT COMPLICATION, WITHOUT LONG-TERM CURRENT USE OF INSULIN: Primary | ICD-10-CM

## 2025-05-13 ENCOUNTER — PHARMACY VISIT (OUTPATIENT)
Dept: PHARMACY | Facility: CLINIC | Age: 50
End: 2025-05-13
Payer: COMMERCIAL

## 2025-05-13 PROCEDURE — RXOTC WILLOW AMBULATORY OTC CHARGE

## 2025-05-30 ENCOUNTER — PHARMACY VISIT (OUTPATIENT)
Dept: PHARMACY | Facility: CLINIC | Age: 50
End: 2025-05-30
Payer: COMMERCIAL

## 2025-05-30 ENCOUNTER — APPOINTMENT (OUTPATIENT)
Dept: PHARMACY | Facility: HOSPITAL | Age: 50
End: 2025-05-30
Payer: COMMERCIAL

## 2025-05-30 DIAGNOSIS — E11.9 TYPE 2 DIABETES MELLITUS WITHOUT COMPLICATION, WITHOUT LONG-TERM CURRENT USE OF INSULIN: ICD-10-CM

## 2025-05-30 PROCEDURE — RXMED WILLOW AMBULATORY MEDICATION CHARGE

## 2025-05-30 RX ORDER — GLIPIZIDE 5 MG/1
5 TABLET ORAL
Qty: 60 TABLET | Refills: 2 | Status: SHIPPED | OUTPATIENT
Start: 2025-05-30

## 2025-05-30 RX ORDER — TIRZEPATIDE 2.5 MG/.5ML
2.5 INJECTION, SOLUTION SUBCUTANEOUS WEEKLY
Qty: 2 ML | Refills: 0 | Status: SHIPPED | OUTPATIENT
Start: 2025-05-30

## 2025-05-30 NOTE — PROGRESS NOTES
"  Clinical Pharmacy Appointment    Patient ID: Darcy Kim \"Darcy Kim\" is a 49 y.o. female who presents for Diabetes.    Pt is here for Follow Up appointment.     Referring Provider: Mike Pierce DO  PCP: Mike Pierce DO  Last visit with PCP: 1/31/2025   Next visit with PCP: 6/6/2025    Subjective   Medication Reconciliation:  Discontinued: Trulicity (cost)    Drug Interactions  No relevant drug interactions were noted.    Medication System Management  Patient's preferred pharmacy:  [facility]  Adherence/Organization: No issues reported  Affordability/Accessibility: No issues reported    HPI  DIABETES MELLITUS TYPE 2:    Diagnosed with diabetes:  >5 years ago.   Known diabetic complications: HTN.  Does patient follow with Endocrinology: No  Last optometry exam: up to date  Most recent visit in Podiatry: N/a -- patient denies sores or cuts on feet today      Current diabetic medications include:  Jardiance 25mg daily  Glipizide 5mg twice a day    Clarifications to above regimen: N/a  Adverse Effects: Denies    Past diabetic medications include:  Trulicity (cost)    Glucose Readings:  Glucometer/CGM Type: Accu-Chek  Patient tests BG 1-2 times per week    Current home FBG readings (mg/dL): 140     Any episodes of hypoglycemia? No,  .  Did patient treat episode of hypoglycemia appropriately? N/A  Does the patient have a prescription for ready-to-use Glucagon? Not on insulin    Lifestyle:  Diet  Breakfast: Cream of wheat with Truvia sugar; 1 cup of coffee  Lunch: Leftovers from the night before, something quick (Spaghetti-Os, wrap and fruit from the cafe at work, etc)  Snack: Pretzels, goldfish, etc  Dinner: Meat, vegetable, noodle/potato (sweet or regular); something quick ([employer] [place] or burger on the way to class - only one bun)  Drinks: Water all day (drinks 4 20oz water bottles throughout the day); pop every one in awhile  Improvements: Ice cream (enjoys every once in awhile); " claire  Patient states she has cut back a lot since her diagnosis     Exercise  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    Secondary Prevention:  Statin? Yes  ACE-I/ARB? No  Aspirin? No    Pertinent PMH Review:  PMH of Pancreatitis: No  PMH of Retinopathy: No  PMH of Urinary Tract Infections: No  PMH of MTC: No  PMH of MEN2: No  UACR/EGFR in last year?: No    Immunizations:  Influenza? No  COVID? No  Pneumonia? No  Shingles? No  Hepatitis B? No      Objective   Allergies[1]  Social History     Social History Narrative    Not on file      Medication Review  Current Outpatient Medications   Medication Instructions    atorvastatin (LIPITOR) 10 mg, oral, Daily    blood sugar diagnostic (Blood Glucose Test) strip Use to test blood sugar once daily    blood-glucose meter misc Use to test blood sugar once daily    empagliflozin (JARDIANCE) 25 mg, oral, Daily    estradiol (ESTRACE) 1 mg, oral, Daily    glipiZIDE (GLUCOTROL) 5 mg, oral, 2 times daily before meals    ibuprofen 800 mg tablet TAKE 1 TABLET BY MOUTH THREE TIMES A DAY AS NEEDED    lancets misc Use to test blood sugar once daily    Mounjaro 2.5 mg, subcutaneous, Weekly    multivitamin (MULTIPLE VITAMINS ORAL) 1 tablet    valACYclovir (VALTREX) 1,000 mg, oral, 2 times daily PRN      Vitals  BP Readings from Last 2 Encounters:   01/31/25 128/78   01/30/25 124/77     BMI Readings from Last 1 Encounters:   01/31/25 28.80 kg/m²      Labs  A1C  Lab Results   Component Value Date    HGBA1C 7.5 (H) 09/11/2024    HGBA1C 9.7 (H) 05/13/2024    HGBA1C 7.5 (H) 01/15/2024     BMP  Lab Results   Component Value Date    CALCIUM 9.5 05/17/2024     05/17/2024    K 4.3 05/17/2024    CO2 27 05/17/2024     05/17/2024    BUN 13 05/17/2024    CREATININE 0.63 05/17/2024    EGFR >90 05/17/2024     LFTs  Lab  Results   Component Value Date    ALT 22 05/17/2024    AST 14 05/17/2024    ALKPHOS 93 05/17/2024    BILITOT 0.5 05/17/2024     FLP  Lab Results   Component Value Date    TRIG 114 05/13/2024    CHOL 203 (H) 05/13/2024    LDLF 131 (H) 09/29/2022    LDLCALC 139 (H) 05/13/2024    HDL 41.0 05/13/2024     Urine Microalbumin  Lab Results   Component Value Date    MICROALBCREA  05/13/2024      Comment:      One or more analytes used in this calculation is outside of the analytical measurement range. Calculation cannot be performed.     Weight Management  Wt Readings from Last 3 Encounters:   01/31/25 72.6 kg (160 lb)   01/30/25 73.6 kg (162 lb 3.2 oz)   12/17/24 72.6 kg (160 lb 2 oz)      There is no height or weight on file to calculate BMI.     Assessment/Plan   Problem List Items Addressed This Visit       Type 2 diabetes mellitus without complication, without long-term current use of insulin    Patient's goal A1c is < 7%.  Is pt at goal? No, 7.5% (9/11/2024)  Patient's SMBGs are above gaol.     Rationale for plan: Patient states the Trulicity was expensive, and she has not been able to  the medication for >3 months. Will try and restart and change to Mounjaro to help with blood sugar control and weight management.    Medication Changes:  CONTINUE  Jardiance 25mg daily  Glipizide 5mg twice a day  START  Mounjaro 2.5mg weekly. Will send to [facility]    Future Considerations:  Titrate Mounjaro as tolerated    Diabetes Education  Rule of 15: eating ~15 g of carbs when BG less than 80 (half cup juice, 3-4 glucose tabs).  Recognize symptoms of high and low blood sugar.   Eat a realistic healthy diet consisting of fruits, vegetables, fiber, protein food choices on a regular basis and be aware of portion/serving sizes. Reduce carbohydrate consumption and always consume with protein and fat. Avoid foods high in saturated/trans fat, high salt content, and sweets and beverages with added sugars.  Limit alcohol  consumption; alcohol may affect your blood sugar and cause hypoglycemia.   Stay active and incorporate ~30 mins of exercise into your daily routine to manage your weight and increase the body's acceptance of insulin.    PATIENT GOALS   Fasting B - 130 mg/dL 2-HR Postprandial BG:   Less than 180 mg/dL A1c:   Less than 7.0 %     Empagliflozin (Jardiance) Education:  Counseled patient on Empagliflozin (Jardiance) MOA, expectations, side effects, duration of therapy, administration, and monitoring parameters.  Reviewed the benefits of SGLT-2i therapy, such as glycemic control and kidney and CV protection.  Advised patient to practice proper  hygiene to reduce risk of UTIs or yeast infections.  Advised patient to maintain adequate fluid intake to remain hydrated while on SGLT2i therapy.  Answered all patient questions and concerns.    Mounjaro Education:  Counseled patient on Mounjaro MOA, expectations, side effects, duration of therapy, administration, and monitoring parameters.  Counseled patient on the benefits of GLP-1ra, such as cardiovascular risk reduction, glycemic control, and weight loss potential.  Provided detailed dosing and administration counseling to ensure proper technique.   Reviewed Mounjaro titration schedule, starting with 2.5 mg once weekly to 5 mg, 7.5mg, 10mg, 12.5mg and if tolerated 15 mg.  Reviewed storage requirements of Mounjaro when not in use, and when to administer the medication if a dose is missed.  Discussed risks of GLP1ra including risk of pancreatitis, MTC and worsening of DR  Advised patient that they may experience improved satiety after meals and portion sizes of meals may be reduced as doses of Mounjaro increase.         Relevant Medications    tirzepatide (Mounjaro) 2.5 mg/0.5 mL pen injector    empagliflozin (Jardiance) 25 mg tablet    glipiZIDE (Glucotrol) 5 mg tablet    Other Relevant Orders    Referral to Clinical Pharmacy       Clinical Pharmacist follow-up:  "6/20/2025 at 8:20AM, Telehealth visit    Continue all meds under the continuation of care with the referring provider and clinical pharmacy team.    Thank you,  Diana Veloz, PharmD   Clinical Pharmacist  [phone]     Verbal consent to manage patient's drug therapy was obtained from the patient. They were informed they may decline to participate or withdraw from participation in pharmacy services at any time.       [1]   Allergies  Allergen Reactions    Canagliflozin Other     UTI, yeast infections    Metformin Other     \"Could not stand straight, bent over in abdominal pain\"     "

## 2025-05-30 NOTE — ASSESSMENT & PLAN NOTE
Patient's goal A1c is < 7%.  Is pt at goal? No, 7.5% (2024)  Patient's SMBGs are above gaol.     Rationale for plan: Patient states the Trulicity was expensive, and she has not been able to  the medication for >3 months. Will try and restart and change to Mounjaro to help with blood sugar control and weight management.    Medication Changes:  CONTINUE  Jardiance 25mg daily  Glipizide 5mg twice a day  START  Mounjaro 2.5mg weekly. Will send to [facility]    Future Considerations:  Titrate Mounjaro as tolerated    Diabetes Education  Rule of 15: eating ~15 g of carbs when BG less than 80 (half cup juice, 3-4 glucose tabs).  Recognize symptoms of high and low blood sugar.   Eat a realistic healthy diet consisting of fruits, vegetables, fiber, protein food choices on a regular basis and be aware of portion/serving sizes. Reduce carbohydrate consumption and always consume with protein and fat. Avoid foods high in saturated/trans fat, high salt content, and sweets and beverages with added sugars.  Limit alcohol consumption; alcohol may affect your blood sugar and cause hypoglycemia.   Stay active and incorporate ~30 mins of exercise into your daily routine to manage your weight and increase the body's acceptance of insulin.    PATIENT GOALS   Fasting B - 130 mg/dL 2-HR Postprandial BG:   Less than 180 mg/dL A1c:   Less than 7.0 %     Empagliflozin (Jardiance) Education:  Counseled patient on Empagliflozin (Jardiance) MOA, expectations, side effects, duration of therapy, administration, and monitoring parameters.  Reviewed the benefits of SGLT-2i therapy, such as glycemic control and kidney and CV protection.  Advised patient to practice proper  hygiene to reduce risk of UTIs or yeast infections.  Advised patient to maintain adequate fluid intake to remain hydrated while on SGLT2i therapy.  Answered all patient questions and concerns.    Mounjaro Education:  Counseled patient on Mounjaro  MOA, expectations, side effects, duration of therapy, administration, and monitoring parameters.  Counseled patient on the benefits of GLP-1ra, such as cardiovascular risk reduction, glycemic control, and weight loss potential.  Provided detailed dosing and administration counseling to ensure proper technique.   Reviewed Mounjaro titration schedule, starting with 2.5 mg once weekly to 5 mg, 7.5mg, 10mg, 12.5mg and if tolerated 15 mg.  Reviewed storage requirements of Mounjaro when not in use, and when to administer the medication if a dose is missed.  Discussed risks of GLP1ra including risk of pancreatitis, MTC and worsening of DR  Advised patient that they may experience improved satiety after meals and portion sizes of meals may be reduced as doses of Mounjaro increase.

## 2025-06-06 ENCOUNTER — APPOINTMENT (OUTPATIENT)
Dept: PRIMARY CARE | Facility: CLINIC | Age: 50
End: 2025-06-06
Payer: COMMERCIAL

## 2025-06-06 VITALS
WEIGHT: 162 LBS | SYSTOLIC BLOOD PRESSURE: 112 MMHG | HEART RATE: 84 BPM | DIASTOLIC BLOOD PRESSURE: 70 MMHG | HEIGHT: 63 IN | OXYGEN SATURATION: 95 % | RESPIRATION RATE: 18 BRPM | TEMPERATURE: 97.1 F | BODY MASS INDEX: 28.7 KG/M2

## 2025-06-06 DIAGNOSIS — M79.645 CHRONIC THUMB PAIN, BILATERAL: ICD-10-CM

## 2025-06-06 DIAGNOSIS — M79.644 CHRONIC THUMB PAIN, BILATERAL: ICD-10-CM

## 2025-06-06 DIAGNOSIS — N95.1 VASOMOTOR SYMPTOMS DUE TO MENOPAUSE: ICD-10-CM

## 2025-06-06 DIAGNOSIS — E78.5 DYSLIPIDEMIA: ICD-10-CM

## 2025-06-06 DIAGNOSIS — I10 PRIMARY HYPERTENSION: ICD-10-CM

## 2025-06-06 DIAGNOSIS — B00.9 HERPES SIMPLEX: ICD-10-CM

## 2025-06-06 DIAGNOSIS — E11.9 TYPE 2 DIABETES MELLITUS WITHOUT COMPLICATION, WITHOUT LONG-TERM CURRENT USE OF INSULIN: Primary | ICD-10-CM

## 2025-06-06 DIAGNOSIS — G89.29 CHRONIC THUMB PAIN, BILATERAL: ICD-10-CM

## 2025-06-06 DIAGNOSIS — D50.8 IRON DEFICIENCY ANEMIA SECONDARY TO INADEQUATE DIETARY IRON INTAKE: ICD-10-CM

## 2025-06-06 LAB
POC FINGERSTICK BLOOD GLUCOSE: 226 MG/DL (ref 70–100)
POC HEMOGLOBIN A1C: 8.5 % (ref 4.2–6.5)

## 2025-06-06 PROCEDURE — 99214 OFFICE O/P EST MOD 30 MIN: CPT | Performed by: FAMILY MEDICINE

## 2025-06-06 PROCEDURE — RXMED WILLOW AMBULATORY MEDICATION CHARGE

## 2025-06-06 PROCEDURE — 83036 HEMOGLOBIN GLYCOSYLATED A1C: CPT | Performed by: FAMILY MEDICINE

## 2025-06-06 PROCEDURE — 82962 GLUCOSE BLOOD TEST: CPT | Performed by: FAMILY MEDICINE

## 2025-06-06 RX ORDER — VALACYCLOVIR HYDROCHLORIDE 1 G/1
1000 TABLET, FILM COATED ORAL 2 TIMES DAILY PRN
Qty: 20 TABLET | Refills: 1 | Status: SHIPPED | OUTPATIENT
Start: 2025-06-06

## 2025-06-06 RX ORDER — GLIPIZIDE 5 MG/1
5 TABLET ORAL
Qty: 60 TABLET | Refills: 2 | Status: SHIPPED | OUTPATIENT
Start: 2025-06-06

## 2025-06-06 RX ORDER — ESTRADIOL 0.5 MG/1
1 TABLET ORAL DAILY
Qty: 180 TABLET | Refills: 3 | Status: SHIPPED | OUTPATIENT
Start: 2025-06-06 | End: 2026-06-06

## 2025-06-06 RX ORDER — TIRZEPATIDE 2.5 MG/.5ML
2.5 INJECTION, SOLUTION SUBCUTANEOUS WEEKLY
Qty: 2 ML | Refills: 0 | Status: SHIPPED | OUTPATIENT
Start: 2025-06-06

## 2025-06-06 ASSESSMENT — ENCOUNTER SYMPTOMS
LOSS OF SENSATION IN FEET: 0
OCCASIONAL FEELINGS OF UNSTEADINESS: 0
DEPRESSION: 0

## 2025-06-06 NOTE — PROGRESS NOTES
"Subjective   Patient ID: Darcy Kim \"Darcy Nelson" is a 49 y.o. female who presents for Diabetes (A1C check; check thumps ).  History of Present Illness  The patient presents for a diabetic follow-up, diagnosed with diabetes type 2, and is here for an A1c check.    At the beginning of the year, she was unable to procure Trulicity due to its high cost of $1800. During her last visit in 02/2025, her blood glucose levels were well-controlled, with readings in the 140s. Over the past few months, she has been experiencing fatigue, increased appetite, and weight gain. She attributes these symptoms to glipizide, which she believes is stimulating her appetite. She has been on glipizide for the past month and a half but misplaced her Jardiance medication a month ago. She recently resumed Mounjaro, which has significantly improved her condition. She has been taking Jardiance, glipizide, and Mounjaro for the past week and a half, and reports feeling much better, with improved control over her eating habits. She also reports experiencing headaches while on Mounjaro.    She has been experiencing difficulty bending her thumb for approximately a year, which she suspects may be due to calcification in the joint. The issue initially affected only one thumb but has since spread to both. She has not had any x-rays done. She also reports arthritis pain, which she manages with Tylenol Arthritis. She avoids ibuprofen as it causes ankle swelling. She is considering magnesium supplementation.    She takes Valtrex as needed when she is stressed out and a cold sore pops up. It resolves in a couple of days and does not become a big flare-up on her face.    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 She reports good sleep quality and bowel movements. She is considering a bladder sling procedure and is working with Dr. Martines on this.    PAST SURGICAL HISTORY:   She had a mesh put in for the " "bladder in the past.    SOCIAL HISTORY  She works as an LPN.    Review of Systems   Constitutional: Negative.  Negative for activity change, appetite change, fatigue and fever.   HENT:  Negative for congestion, dental problem, ear discharge, ear pain, mouth sores, rhinorrhea, sinus pressure, sinus pain, sore throat, tinnitus and trouble swallowing.    Eyes:  Negative for photophobia, pain and visual disturbance.   Respiratory:  Negative for cough, chest tightness, shortness of breath and stridor.    Cardiovascular:  Negative for chest pain and palpitations.   Gastrointestinal:  Negative for abdominal distention, abdominal pain, blood in stool, constipation, diarrhea and rectal pain.   Endocrine: Negative for cold intolerance, heat intolerance, polydipsia, polyphagia and polyuria.   Genitourinary:  Negative for dysuria, flank pain, hematuria and urgency.   Musculoskeletal:  Negative for arthralgias, gait problem, myalgias and neck stiffness.   Skin:  Negative for color change and rash.   Allergic/Immunologic: Negative for environmental allergies and food allergies.   Neurological:  Negative for dizziness, seizures, syncope, speech difficulty and headaches.   Hematological:  Negative for adenopathy.   Psychiatric/Behavioral:  Negative for agitation, confusion, decreased concentration, dysphoric mood and sleep disturbance. The patient is not nervous/anxious.    The above were reviewed and noted negative except as noted in HPI and Problem List.    Objective     /70 (BP Location: Right arm, Patient Position: Sitting, BP Cuff Size: Adult)   Pulse 84   Temp 36.2 °C (97.1 °F) (Temporal)   Resp 18   Ht 1.588 m (5' 2.5\")   Wt 73.5 kg (162 lb)   LMP  (LMP Unknown)   SpO2 95%   BMI 29.16 kg/m²      Physical Exam    Constitutional: Well developed, well nourished, alert and in no acute distress   Eyes: Normal external exam. Pupils equally round and reactive to light with normal accommodation and extraocular " movements intact.  Neck: Supple, no lymphadenopathy or masses.   Cardiovascular: Regular rate and rhythm, normal S1 and S2, no murmurs, gallops, or rubs. Radial pulses normal. No peripheral edema.  Pulmonary: No respiratory distress, lungs clear to auscultation bilaterally. No wheezes, rhonchi, rales.  Abdomen: soft,non tender, non distended, without masses or HSM  Skin: Warm, well perfused, normal skin turgor and color.   Neurologic: Cranial nerves II-XII grossly intact.   Psychiatric: Mood calm and affect normal  Musculoskeletal: Moving all extremities without restriction  The above were reviewed and noted negative except as noted in HPI and Problem List.    Problem List Items Addressed This Visit       Herpes simplex    Relevant Medications    valACYclovir (Valtrex) 1 gram tablet    Dyslipidemia    Relevant Orders    Lipid Panel    Comprehensive Metabolic Panel    Type 2 diabetes mellitus without complication, without long-term current use of insulin - Primary    Relevant Medications    tirzepatide (Mounjaro) 2.5 mg/0.5 mL pen injector    glipiZIDE (Glucotrol) 5 mg tablet    empagliflozin (Jardiance) 25 mg tablet    Other Relevant Orders    POCT glycosylated hemoglobin (Hb A1C) manually resulted (Completed)    POCT fingerstick glucose manually resulted (Completed)    Primary hypertension     Other Visit Diagnoses         Vasomotor symptoms due to menopause        Relevant Medications    estradiol (Estrace) 0.5 mg tablet      Iron deficiency anemia secondary to inadequate dietary iron intake        Relevant Orders    CBC and Auto Differential      Chronic thumb pain, bilateral        Relevant Orders    XR thumb left MIN 2 views    XR thumb right MIN 2 views             Assessment & Plan  1. Type 2 Diabetes Mellitus.  - Her A1c level was recorded at 7.5 eight months prior.  - She has been experiencing weight gain and increased hunger, which she attributes to glipizide.  - She has recently restarted Mounjaro and  reports feeling better with improved control over her eating habits.  - She is currently taking Jardiance, glipizide, and Mounjaro. If her diabetes can be managed with Mounjaro alone, glipizide will be discontinued. A repeat A1c test will be conducted during the summer.    2. Thumb Pain.  - She reports difficulty bending her thumb and soreness, which has been present for about a year and is worsening.  - An x-ray of the thumb will be ordered to assess for any calcification or spurs.  - A referral to a hand specialist will be made for further evaluation and potential cortisone injection.  - The pain is primarily in the left thumb but is now affecting both thumbs.    3. Cold Sores.  - She uses Valtrex as needed when she experiences cold sores, which helps prevent severe flare-ups.  - The medication is taken during periods of stress when cold sores appear.  - Valtrex effectively reduces the duration and severity of cold sores.  - No new prescriptions or changes in management for cold sores were discussed.    4. Health Maintenance.  - Her last blood work was conducted on 05/17/2024, with all results within normal limits.  - She is advised to consider magnesium supplementation.  - Information regarding the Prevnar 20 vaccine will be provided for her review.  - Follow-up blood work and repeat tests are recommended sometime this summer.    Follow-up  The patient will follow up in 3 months.    Results  Labs   - A1c: 7.5   - Potassium: 05/2024, 4.3   - Sodium: 05/2024, 138   - BUN: 05/2024, 13   - Creatinine: 05/2024, 0.63   - GFR: 05/2024, Greater than 60   - AST: 05/2024, 14   - ALT: 05/2024, 22       Mike Pierce,      This medical note was created with the assistance of artificial intelligence (AI) for documentation purposes. The content has been reviewed and confirmed by the healthcare provider for accuracy and completeness. Patient consented to the use of audio recording and use of AI during their visit.

## 2025-06-06 NOTE — PATIENT INSTRUCTIONS
Follow up in 3 months    Continue current medications and therapy for chronic medical conditions.    Patient was advised importance of proper diet/nutrition in addition adequate hydration. Patient was encouraged moderate exercise program to include 30 minutes daily for 5 days of the week or 150 minutes weekly. Patient will follow-up with us as scheduled.

## 2025-06-18 ENCOUNTER — PHARMACY VISIT (OUTPATIENT)
Dept: PHARMACY | Facility: CLINIC | Age: 50
End: 2025-06-18
Payer: COMMERCIAL

## 2025-06-19 NOTE — PROGRESS NOTES
"  Clinical Pharmacy Appointment    Patient ID: Darcy Kim \"Darcy Kim\" is a 49 y.o. female who presents for Diabetes.    Pt is here for Follow Up appointment.     Referring Provider: Mike Pierce DO  PCP: Mike Pierce DO  Last visit with PCP: 6/6/2025   Next visit with PCP: 7/18/2025    Subjective   Medication Reconciliation:  No changes made today    Drug Interactions  No relevant drug interactions were noted.    Medication System Management  Patient's preferred pharmacy: [facility]  Adherence/Organization: No issues reported  Affordability/Accessibility: No issues reported    HPI  DIABETES MELLITUS TYPE 2:    Diagnosed with diabetes:  >5 years ago.   Known diabetic complications: HTN.  Does patient follow with Endocrinology: No  Last optometry exam: up to date  Most recent visit in Podiatry: N/a -- patient denies sores or cuts on feet today      Current diabetic medications include:  Jardiance 25mg daily  Glipizide 5mg twice a day  Mounjaro 2.5mg weekly (Sunday)    Clarifications to above regimen: N/a  Adverse Effects: Denies    Past diabetic medications include:  Trulicity (cost)    Glucose Readings:  Glucometer/CGM Type: Accu-Chek  Patient tests BG 1-2 times per week    Current home FBG readings (mg/dL): 140-160    Any episodes of hypoglycemia? No,  .  Did patient treat episode of hypoglycemia appropriately? N/A  Does the patient have a prescription for ready-to-use Glucagon? Not on insulin    Lifestyle:  Diet  Breakfast: Cream of wheat with Truvia sugar; 1 cup of coffee  Lunch: Leftovers from the night before, something quick (Spaghetti-Os, wrap and fruit from the cafe at work, etc)  Snack: Pretzels, goldfish, etc  Dinner: Meat, vegetable, noodle/potato (sweet or regular); something quick (Hamburger Sandwich or burger on the way to class - only one bun)  Drinks: Water all day (drinks 4 20oz water bottles throughout the day); pop every one in awhile  Improvements: Ice cream (enjoys every " once in awhile); claire  Patient states she has cut back a lot since her diagnosis     Exercise  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    Secondary Prevention:  Statin? Yes  ACE-I/ARB? No  Aspirin? No    Pertinent PMH Review:  PMH of Pancreatitis: No  PMH of Retinopathy: No  PMH of Urinary Tract Infections: No  PMH of MTC: No  PMH of MEN2: No  UACR/EGFR in last year?: No    Immunizations:  Influenza? No  COVID? No  Pneumonia? No  Shingles? No  Hepatitis B? No      Objective   Allergies[1]  Social History     Social History Narrative    Not on file      Medication Review  Current Outpatient Medications   Medication Instructions    atorvastatin (LIPITOR) 10 mg, oral, Daily    blood sugar diagnostic (Blood Glucose Test) strip Use to test blood sugar once daily    blood-glucose meter misc Use to test blood sugar once daily    empagliflozin (JARDIANCE) 25 mg, oral, Daily    estradiol (ESTRACE) 1 mg, oral, Daily    glipiZIDE (GLUCOTROL) 5 mg, oral, 2 times daily before meals    ibuprofen 800 mg tablet TAKE 1 TABLET BY MOUTH THREE TIMES A DAY AS NEEDED    lancets misc Use to test blood sugar once daily    Mounjaro 5 mg, subcutaneous, Weekly    multivitamin (MULTIPLE VITAMINS ORAL) 1 tablet    valACYclovir (VALTREX) 1,000 mg, oral, 2 times daily PRN      Vitals  BP Readings from Last 2 Encounters:   06/06/25 112/70   01/31/25 128/78     BMI Readings from Last 1 Encounters:   06/06/25 29.16 kg/m²      Labs  A1C  Lab Results   Component Value Date    HGBA1C 8.5 (A) 06/06/2025    HGBA1C 7.5 (H) 09/11/2024    HGBA1C 9.7 (H) 05/13/2024     BMP  Lab Results   Component Value Date    CALCIUM 9.5 05/17/2024     05/17/2024    K 4.3 05/17/2024    CO2 27 05/17/2024     05/17/2024    BUN 13 05/17/2024    CREATININE 0.63 05/17/2024    EGFR >90  05/17/2024     LFTs  Lab Results   Component Value Date    ALT 22 05/17/2024    AST 14 05/17/2024    ALKPHOS 93 05/17/2024    BILITOT 0.5 05/17/2024     FLP  Lab Results   Component Value Date    TRIG 114 05/13/2024    CHOL 203 (H) 05/13/2024    LDLF 131 (H) 09/29/2022    LDLCALC 139 (H) 05/13/2024    HDL 41.0 05/13/2024     Urine Microalbumin  Lab Results   Component Value Date    MICROALBCREA  05/13/2024      Comment:      One or more analytes used in this calculation is outside of the analytical measurement range. Calculation cannot be performed.     Weight Management  Wt Readings from Last 3 Encounters:   06/06/25 73.5 kg (162 lb)   01/31/25 72.6 kg (160 lb)   01/30/25 73.6 kg (162 lb 3.2 oz)      There is no height or weight on file to calculate BMI.     Assessment/Plan   Problem List Items Addressed This Visit       Type 2 diabetes mellitus without complication, without long-term current use of insulin    Patient's goal A1c is < 7%.  Is pt at goal? No, 8.5% (6/6/2025)  Patient's SMBGs are above gaol.     Rationale for plan: Patient states she is tolerating Mounjaro well, but blood sugars still elevated. Will increase dose and follow up in 4 weeks.    Medication Changes:  CONTINUE  Jardiance 25mg daily  Glipizide 5mg twice a day  INCREASE  Mounjaro to 5mg weekly. Will send to [facility]    Future Considerations:  Titrate Mounjaro as tolerated    Diabetes Education  Rule of 15: eating ~15 g of carbs when BG less than 80 (half cup juice, 3-4 glucose tabs).  Recognize symptoms of high and low blood sugar.   Eat a realistic healthy diet consisting of fruits, vegetables, fiber, protein food choices on a regular basis and be aware of portion/serving sizes. Reduce carbohydrate consumption and always consume with protein and fat. Avoid foods high in saturated/trans fat, high salt content, and sweets and beverages with added sugars.  Limit alcohol consumption; alcohol may affect your blood sugar and cause  hypoglycemia.   Stay active and incorporate ~30 mins of exercise into your daily routine to manage your weight and increase the body's acceptance of insulin.    PATIENT GOALS   Fasting B - 130 mg/dL 2-HR Postprandial BG:   Less than 180 mg/dL A1c:   Less than 7.0 %     Empagliflozin (Jardiance) Education:  Counseled patient on Empagliflozin (Jardiance) MOA, expectations, side effects, duration of therapy, administration, and monitoring parameters.  Reviewed the benefits of SGLT-2i therapy, such as glycemic control and kidney and CV protection.  Advised patient to practice proper  hygiene to reduce risk of UTIs or yeast infections.  Advised patient to maintain adequate fluid intake to remain hydrated while on SGLT2i therapy.  Answered all patient questions and concerns.    Mounjaro Education:  Counseled patient on Mounjaro MOA, expectations, side effects, duration of therapy, administration, and monitoring parameters.  Counseled patient on the benefits of GLP-1ra, such as cardiovascular risk reduction, glycemic control, and weight loss potential.  Provided detailed dosing and administration counseling to ensure proper technique.   Reviewed Mounjaro titration schedule, starting with 2.5 mg once weekly to 5 mg, 7.5mg, 10mg, 12.5mg and if tolerated 15 mg.  Reviewed storage requirements of Mounjaro when not in use, and when to administer the medication if a dose is missed.  Discussed risks of GLP1ra including risk of pancreatitis, MTC and worsening of DR  Advised patient that they may experience improved satiety after meals and portion sizes of meals may be reduced as doses of Mounjaro increase.         Relevant Medications    tirzepatide (Mounjaro) 5 mg/0.5 mL pen injector    Other Relevant Orders    Referral to Clinical Pharmacy     Clinical Pharmacist follow-up: 2025 at 8:40AM, Telehealth visit    Continue all meds under the continuation of care with the referring provider and clinical pharmacy  "team.    Thank you,  Diana Veloz, PharmD   Clinical Pharmacist  [phone]     Verbal consent to manage patient's drug therapy was obtained from the patient. They were informed they may decline to participate or withdraw from participation in pharmacy services at any time.       [1]   Allergies  Allergen Reactions    Canagliflozin Other     UTI, yeast infections    Metformin Other     \"Could not stand straight, bent over in abdominal pain\"     "

## 2025-06-20 ENCOUNTER — APPOINTMENT (OUTPATIENT)
Dept: PHARMACY | Facility: HOSPITAL | Age: 50
End: 2025-06-20
Payer: COMMERCIAL

## 2025-06-20 DIAGNOSIS — E11.9 TYPE 2 DIABETES MELLITUS WITHOUT COMPLICATION, WITHOUT LONG-TERM CURRENT USE OF INSULIN: ICD-10-CM

## 2025-06-20 PROCEDURE — RXMED WILLOW AMBULATORY MEDICATION CHARGE

## 2025-06-20 RX ORDER — TIRZEPATIDE 5 MG/.5ML
5 INJECTION, SOLUTION SUBCUTANEOUS WEEKLY
Qty: 2 ML | Refills: 0 | Status: SHIPPED | OUTPATIENT
Start: 2025-06-20

## 2025-06-20 NOTE — ASSESSMENT & PLAN NOTE
Patient's goal A1c is < 7%.  Is pt at goal? No, 8.5% (2025)  Patient's SMBGs are above gaol.     Rationale for plan: Patient states she is tolerating Mounjaro well, but blood sugars still elevated. Will increase dose and follow up in 4 weeks.    Medication Changes:  CONTINUE  Jardiance 25mg daily  Glipizide 5mg twice a day  INCREASE  Mounjaro to 5mg weekly. Will send to [facility]    Future Considerations:  Titrate Mounjaro as tolerated    Diabetes Education  Rule of 15: eating ~15 g of carbs when BG less than 80 (half cup juice, 3-4 glucose tabs).  Recognize symptoms of high and low blood sugar.   Eat a realistic healthy diet consisting of fruits, vegetables, fiber, protein food choices on a regular basis and be aware of portion/serving sizes. Reduce carbohydrate consumption and always consume with protein and fat. Avoid foods high in saturated/trans fat, high salt content, and sweets and beverages with added sugars.  Limit alcohol consumption; alcohol may affect your blood sugar and cause hypoglycemia.   Stay active and incorporate ~30 mins of exercise into your daily routine to manage your weight and increase the body's acceptance of insulin.    PATIENT GOALS   Fasting B - 130 mg/dL 2-HR Postprandial BG:   Less than 180 mg/dL A1c:   Less than 7.0 %     Empagliflozin (Jardiance) Education:  Counseled patient on Empagliflozin (Jardiance) MOA, expectations, side effects, duration of therapy, administration, and monitoring parameters.  Reviewed the benefits of SGLT-2i therapy, such as glycemic control and kidney and CV protection.  Advised patient to practice proper  hygiene to reduce risk of UTIs or yeast infections.  Advised patient to maintain adequate fluid intake to remain hydrated while on SGLT2i therapy.  Answered all patient questions and concerns.    Mounjaro Education:  Counseled patient on Mounjaro MOA, expectations, side effects, duration of therapy, administration, and monitoring  parameters.  Counseled patient on the benefits of GLP-1ra, such as cardiovascular risk reduction, glycemic control, and weight loss potential.  Provided detailed dosing and administration counseling to ensure proper technique.   Reviewed Mounjaro titration schedule, starting with 2.5 mg once weekly to 5 mg, 7.5mg, 10mg, 12.5mg and if tolerated 15 mg.  Reviewed storage requirements of Mounjaro when not in use, and when to administer the medication if a dose is missed.  Discussed risks of GLP1ra including risk of pancreatitis, MTC and worsening of DR  Advised patient that they may experience improved satiety after meals and portion sizes of meals may be reduced as doses of Mounjaro increase.

## 2025-06-25 ENCOUNTER — PHARMACY VISIT (OUTPATIENT)
Dept: PHARMACY | Facility: CLINIC | Age: 50
End: 2025-06-25
Payer: COMMERCIAL

## 2025-06-29 ENCOUNTER — APPOINTMENT (OUTPATIENT)
Dept: CT IMAGING | Age: 50
End: 2025-06-29
Payer: COMMERCIAL

## 2025-06-29 ENCOUNTER — HOSPITAL ENCOUNTER (EMERGENCY)
Age: 50
Discharge: HOME | End: 2025-06-29
Payer: COMMERCIAL

## 2025-06-29 ENCOUNTER — APPOINTMENT (OUTPATIENT)
Dept: GENERAL RADIOLOGY | Age: 50
End: 2025-06-29
Payer: COMMERCIAL

## 2025-06-29 VITALS
DIASTOLIC BLOOD PRESSURE: 76 MMHG | SYSTOLIC BLOOD PRESSURE: 137 MMHG | RESPIRATION RATE: 20 BRPM | WEIGHT: 155 LBS | OXYGEN SATURATION: 97 % | HEIGHT: 62 IN | BODY MASS INDEX: 28.52 KG/M2 | TEMPERATURE: 97.7 F | HEART RATE: 89 BPM

## 2025-06-29 DIAGNOSIS — S16.1XXA STRAIN OF NECK MUSCLE, INITIAL ENCOUNTER: ICD-10-CM
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
## 2025-06-29 DIAGNOSIS — R51.9 NONINTRACTABLE HEADACHE, UNSPECIFIED CHRONICITY PATTERN, UNSPECIFIED HEADACHE TYPE: ICD-10-CM

## 2025-06-29 DIAGNOSIS — S43.402A SPRAIN OF LEFT SHOULDER, UNSPECIFIED SHOULDER SPRAIN TYPE, INITIAL ENCOUNTER: ICD-10-CM

## 2025-06-29 DIAGNOSIS — S09.90XA CLOSED HEAD INJURY, INITIAL ENCOUNTER: ICD-10-CM

## 2025-06-29 PROCEDURE — 6360000002 HC RX W HCPCS

## 2025-06-29 PROCEDURE — 6370000000 HC RX 637 (ALT 250 FOR IP)

## 2025-06-29 PROCEDURE — 99284 EMERGENCY DEPT VISIT MOD MDM: CPT

## 2025-06-29 PROCEDURE — 72125 CT NECK SPINE W/O DYE: CPT

## 2025-06-29 PROCEDURE — 73030 X-RAY EXAM OF SHOULDER: CPT

## 2025-06-29 PROCEDURE — 96372 THER/PROPH/DIAG INJ SC/IM: CPT

## 2025-06-29 PROCEDURE — 70450 CT HEAD/BRAIN W/O DYE: CPT

## 2025-06-29 PROCEDURE — 71045 X-RAY EXAM CHEST 1 VIEW: CPT

## 2025-06-29 RX ORDER — GLIPIZIDE 5 MG/1
5 TABLET ORAL
COMMUNITY
Start: 2025-06-06

## 2025-06-29 RX ORDER — KETOROLAC TROMETHAMINE 10 MG/1
10 TABLET, FILM COATED ORAL EVERY 6 HOURS PRN
Qty: 20 TABLET | Refills: 0 | Status: SHIPPED | OUTPATIENT
Start: 2025-06-29

## 2025-06-29 RX ORDER — TIRZEPATIDE 5 MG/.5ML
5 INJECTION, SOLUTION SUBCUTANEOUS WEEKLY
COMMUNITY
Start: 2025-06-20

## 2025-06-29 RX ORDER — CYCLOBENZAPRINE HCL 5 MG
5 TABLET ORAL 2 TIMES DAILY PRN
Qty: 10 TABLET | Refills: 0 | Status: SHIPPED | OUTPATIENT
Start: 2025-06-29 | End: 2025-07-09

## 2025-06-29 RX ORDER — ESTRADIOL 0.5 MG/1
1 TABLET ORAL DAILY
COMMUNITY
Start: 2025-06-06 | End: 2026-06-06

## 2025-06-29 RX ORDER — CYCLOBENZAPRINE HCL 5 MG
5 TABLET ORAL 2 TIMES DAILY PRN
Qty: 10 TABLET | Refills: 0 | Status: SHIPPED | OUTPATIENT
Start: 2025-06-29 | End: 2025-06-29

## 2025-06-29 RX ORDER — KETOROLAC TROMETHAMINE 30 MG/ML
30 INJECTION, SOLUTION INTRAMUSCULAR; INTRAVENOUS ONCE
Status: COMPLETED | OUTPATIENT
Start: 2025-06-29 | End: 2025-06-29

## 2025-06-29 RX ORDER — CYCLOBENZAPRINE HCL 10 MG
10 TABLET ORAL ONCE
Status: COMPLETED | OUTPATIENT
Start: 2025-06-29 | End: 2025-06-29

## 2025-06-29 RX ORDER — KETOROLAC TROMETHAMINE 10 MG/1
10 TABLET, FILM COATED ORAL EVERY 6 HOURS PRN
Qty: 20 TABLET | Refills: 0 | Status: SHIPPED | OUTPATIENT
Start: 2025-06-29 | End: 2025-06-29

## 2025-06-29 RX ORDER — ACETAMINOPHEN 500 MG
1000 TABLET ORAL ONCE
Status: COMPLETED | OUTPATIENT
Start: 2025-06-29 | End: 2025-06-29

## 2025-06-29 RX ADMIN — KETOROLAC TROMETHAMINE 30 MG: 30 INJECTION, SOLUTION INTRAMUSCULAR at 15:53

## 2025-06-29 RX ADMIN — ACETAMINOPHEN 1000 MG: 500 TABLET ORAL at 15:07

## 2025-06-29 RX ADMIN — CYCLOBENZAPRINE 10 MG: 10 TABLET, FILM COATED ORAL at 15:07

## 2025-06-29 ASSESSMENT — PAIN DESCRIPTION - LOCATION: LOCATION: HEAD

## 2025-06-29 ASSESSMENT — PAIN DESCRIPTION - DESCRIPTORS: DESCRIPTORS: POUNDING

## 2025-06-29 ASSESSMENT — PAIN - FUNCTIONAL ASSESSMENT
PAIN_FUNCTIONAL_ASSESSMENT: ACTIVITIES ARE NOT PREVENTED
PAIN_FUNCTIONAL_ASSESSMENT: 0-10

## 2025-06-29 ASSESSMENT — PAIN SCALES - GENERAL: PAINLEVEL_OUTOF10: 9

## 2025-06-29 ASSESSMENT — PAIN DESCRIPTION - PAIN TYPE: TYPE: ACUTE PAIN

## 2025-06-29 ASSESSMENT — PAIN DESCRIPTION - ONSET: ONSET: PROGRESSIVE

## 2025-06-29 ASSESSMENT — PAIN DESCRIPTION - FREQUENCY: FREQUENCY: CONTINUOUS
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
## 2025-07-18 ENCOUNTER — HOSPITAL ENCOUNTER (OUTPATIENT)
Dept: RADIOLOGY | Facility: HOSPITAL | Age: 50
Discharge: HOME | End: 2025-07-18
Payer: COMMERCIAL

## 2025-07-18 ENCOUNTER — APPOINTMENT (OUTPATIENT)
Dept: PRIMARY CARE | Facility: CLINIC | Age: 50
End: 2025-07-18
Payer: COMMERCIAL

## 2025-07-18 ENCOUNTER — TELEMEDICINE (OUTPATIENT)
Dept: PHARMACY | Facility: HOSPITAL | Age: 50
End: 2025-07-18
Payer: COMMERCIAL

## 2025-07-18 VITALS
WEIGHT: 158 LBS | HEART RATE: 98 BPM | SYSTOLIC BLOOD PRESSURE: 112 MMHG | RESPIRATION RATE: 18 BRPM | TEMPERATURE: 96 F | HEIGHT: 63 IN | OXYGEN SATURATION: 98 % | BODY MASS INDEX: 28 KG/M2 | DIASTOLIC BLOOD PRESSURE: 78 MMHG

## 2025-07-18 DIAGNOSIS — Z12.11 ENCOUNTER FOR COLORECTAL CANCER SCREENING: ICD-10-CM

## 2025-07-18 DIAGNOSIS — Z00.00 ANNUAL PHYSICAL EXAM: Primary | ICD-10-CM

## 2025-07-18 DIAGNOSIS — M79.645 PAIN IN FINGER OF BOTH HANDS: ICD-10-CM

## 2025-07-18 DIAGNOSIS — N95.1 VASOMOTOR SYMPTOMS DUE TO MENOPAUSE: ICD-10-CM

## 2025-07-18 DIAGNOSIS — Z12.31 ENCOUNTER FOR SCREENING MAMMOGRAM FOR BREAST CANCER: ICD-10-CM

## 2025-07-18 DIAGNOSIS — E11.9 TYPE 2 DIABETES MELLITUS WITHOUT COMPLICATION, WITHOUT LONG-TERM CURRENT USE OF INSULIN: Primary | ICD-10-CM

## 2025-07-18 DIAGNOSIS — E78.5 DYSLIPIDEMIA: ICD-10-CM

## 2025-07-18 DIAGNOSIS — I10 PRIMARY HYPERTENSION: ICD-10-CM

## 2025-07-18 DIAGNOSIS — M79.644 PAIN IN FINGER OF BOTH HANDS: ICD-10-CM

## 2025-07-18 DIAGNOSIS — M25.562 PAIN IN LEFT KNEE: ICD-10-CM

## 2025-07-18 DIAGNOSIS — B00.9 HERPES SIMPLEX: ICD-10-CM

## 2025-07-18 DIAGNOSIS — F41.9 ANXIETY: ICD-10-CM

## 2025-07-18 DIAGNOSIS — Z12.12 ENCOUNTER FOR COLORECTAL CANCER SCREENING: ICD-10-CM

## 2025-07-18 DIAGNOSIS — E11.9 TYPE 2 DIABETES MELLITUS WITHOUT COMPLICATION, WITHOUT LONG-TERM CURRENT USE OF INSULIN: ICD-10-CM

## 2025-07-18 PROCEDURE — 73130 X-RAY EXAM OF HAND: CPT | Mod: 50

## 2025-07-18 PROCEDURE — 99396 PREV VISIT EST AGE 40-64: CPT | Performed by: FAMILY MEDICINE

## 2025-07-18 PROCEDURE — RXMED WILLOW AMBULATORY MEDICATION CHARGE

## 2025-07-18 RX ORDER — ESTRADIOL 0.5 MG/1
1 TABLET ORAL DAILY
Qty: 180 TABLET | Refills: 1 | Status: SHIPPED | OUTPATIENT
Start: 2025-07-18 | End: 2026-07-18

## 2025-07-18 RX ORDER — IBUPROFEN 800 MG/1
800 TABLET, FILM COATED ORAL 3 TIMES DAILY PRN
Qty: 100 TABLET | Refills: 0 | Status: SHIPPED | OUTPATIENT
Start: 2025-07-18

## 2025-07-18 RX ORDER — TIRZEPATIDE 7.5 MG/.5ML
7.5 INJECTION, SOLUTION SUBCUTANEOUS WEEKLY
Qty: 2 ML | Refills: 0 | Status: SHIPPED | OUTPATIENT
Start: 2025-07-18

## 2025-07-18 RX ORDER — GLIPIZIDE 5 MG/1
5 TABLET ORAL
Qty: 30 TABLET | Refills: 2 | Status: SHIPPED | OUTPATIENT
Start: 2025-07-18

## 2025-07-18 RX ORDER — VALACYCLOVIR HYDROCHLORIDE 1 G/1
1000 TABLET, FILM COATED ORAL 2 TIMES DAILY PRN
Qty: 20 TABLET | Refills: 1 | Status: CANCELLED | OUTPATIENT
Start: 2025-07-18

## 2025-07-18 NOTE — ASSESSMENT & PLAN NOTE
Patient's goal A1c is < 7%.  Is pt at goal? No, 8.5% (2025)  Patient's SMBGs are above gaol.     Rationale for plan: Patient states she is tolerating Mounjaro well, but blood sugars still elevated. Will increase dose and follow up in 4 weeks.    Medication Changes:  CONTINUE  Jardiance 25mg daily  Glipizide 5mg twice a day  INCREASE  Mounjaro to 7.5mg weekly. Will send to [facility]    Future Considerations:  Titrate Mounjaro as tolerated    Diabetes Education  Rule of 15: eating ~15 g of carbs when BG less than 80 (half cup juice, 3-4 glucose tabs).  Recognize symptoms of high and low blood sugar.   Eat a realistic healthy diet consisting of fruits, vegetables, fiber, protein food choices on a regular basis and be aware of portion/serving sizes. Reduce carbohydrate consumption and always consume with protein and fat. Avoid foods high in saturated/trans fat, high salt content, and sweets and beverages with added sugars.  Limit alcohol consumption; alcohol may affect your blood sugar and cause hypoglycemia.   Stay active and incorporate ~30 mins of exercise into your daily routine to manage your weight and increase the body's acceptance of insulin.    PATIENT GOALS   Fasting B - 130 mg/dL 2-HR Postprandial BG:   Less than 180 mg/dL A1c:   Less than 7.0 %     Empagliflozin (Jardiance) Education:  Counseled patient on Empagliflozin (Jardiance) MOA, expectations, side effects, duration of therapy, administration, and monitoring parameters.  Reviewed the benefits of SGLT-2i therapy, such as glycemic control and kidney and CV protection.  Advised patient to practice proper  hygiene to reduce risk of UTIs or yeast infections.  Advised patient to maintain adequate fluid intake to remain hydrated while on SGLT2i therapy.  Answered all patient questions and concerns.    Mounjaro Education:  Counseled patient on Mounjaro MOA, expectations, side effects, duration of therapy, administration, and  monitoring parameters.  Counseled patient on the benefits of GLP-1ra, such as cardiovascular risk reduction, glycemic control, and weight loss potential.  Provided detailed dosing and administration counseling to ensure proper technique.   Reviewed Mounjaro titration schedule, starting with 2.5 mg once weekly to 5 mg, 7.5mg, 10mg, 12.5mg and if tolerated 15 mg.  Reviewed storage requirements of Mounjaro when not in use, and when to administer the medication if a dose is missed.  Discussed risks of GLP1ra including risk of pancreatitis, MTC and worsening of DR  Advised patient that they may experience improved satiety after meals and portion sizes of meals may be reduced as doses of Mounjaro increase.

## 2025-07-18 NOTE — PROGRESS NOTES
"  Clinical Pharmacy Appointment    Patient ID: Darcy Kim \"Darcy Kim\" is a 50 y.o. female who presents for Diabetes.    Pt is here for Follow Up appointment.     Referring Provider: Mike Pierce DO  PCP: Mike Pierce DO  Last visit with PCP: 6/6/2025   Next visit with PCP: 7/18/2025    Subjective   Medication Reconciliation:  No changes made today    Drug Interactions  No relevant drug interactions were noted.    Medication System Management  Patient's preferred pharmacy: [facility]  Adherence/Organization: No issues reported  Affordability/Accessibility: No issues reported    HPI  DIABETES MELLITUS TYPE 2:    Diagnosed with diabetes:  >5 years ago.   Known diabetic complications: HTN.  Does patient follow with Endocrinology: No  Last optometry exam: up to date  Most recent visit in Podiatry: N/a -- patient denies sores or cuts on feet today      Current diabetic medications include:  Jardiance 25mg daily  Glipizide 5mg twice a day  Mounjaro 5mg weekly (Sunday)    Clarifications to above regimen: N/a  Adverse Effects: feeling \"blah\"    Past diabetic medications include:  Trulicity (cost)    Glucose Readings:  Glucometer/CGM Type: Accu-Chek  Patient tests BG 1-2 times per week    Current home FBG readings (mg/dL): 140-160    Any episodes of hypoglycemia? No,  .  Did patient treat episode of hypoglycemia appropriately? N/A  Does the patient have a prescription for ready-to-use Glucagon? Not on insulin    Lifestyle:  Diet  Breakfast: Cream of wheat with Truvia sugar; 1 cup of coffee  Lunch: Leftovers from the night before, something quick (Spaghetti-Os, wrap and fruit from the cafe at work, etc)  Snack: Pretzels, goldfish, etc  Dinner: Meat, vegetable, noodle/potato (sweet or regular); something quick (Hamb[employer] or burger on the way to class - only one bun)  Drinks: Water all day (drinks 4 20oz water bottles throughout the day); pop every one in awhile  Improvements: Ice cream (enjoys " every once in awhile); claire  Patient states she has cut back a lot since her diagnosis     Exercise  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    Secondary Prevention:  Statin? Yes  ACE-I/ARB? No  Aspirin? No    Pertinent PMH Review:  PMH of Pancreatitis: No  PMH of Retinopathy: No  PMH of Urinary Tract Infections: No  PMH of MTC: No  PMH of MEN2: No  UACR/EGFR in last year?: No    Immunizations:  Influenza? No  COVID? No  Pneumonia? No  Shingles? No  Hepatitis B? No      Objective   Allergies[1]  Social History     Social History Narrative    Not on file      Medication Review  Current Outpatient Medications   Medication Instructions    atorvastatin (LIPITOR) 10 mg, oral, Daily    blood sugar diagnostic (Blood Glucose Test) strip Use to test blood sugar once daily    blood-glucose meter misc Use to test blood sugar once daily    empagliflozin (JARDIANCE) 25 mg, oral, Daily    estradiol (ESTRACE) 1 mg, oral, Daily    glipiZIDE (GLUCOTROL) 5 mg, oral, 2 times daily before meals    ibuprofen 800 mg tablet TAKE 1 TABLET BY MOUTH THREE TIMES A DAY AS NEEDED    lancets misc Use to test blood sugar once daily    Mounjaro 7.5 mg, subcutaneous, Weekly    multivitamin (MULTIPLE VITAMINS ORAL) 1 tablet    valACYclovir (VALTREX) 1,000 mg, oral, 2 times daily PRN      Vitals  BP Readings from Last 2 Encounters:   06/06/25 112/70   01/31/25 128/78     BMI Readings from Last 1 Encounters:   06/06/25 29.16 kg/m²      Labs  A1C  Lab Results   Component Value Date    HGBA1C 8.5 (A) 06/06/2025    HGBA1C 7.5 (H) 09/11/2024    HGBA1C 9.7 (H) 05/13/2024     BMP  Lab Results   Component Value Date    CALCIUM 9.5 05/17/2024     05/17/2024    K 4.3 05/17/2024    CO2 27 05/17/2024     05/17/2024    BUN 13 05/17/2024    CREATININE 0.63 05/17/2024    EGFR >90  05/17/2024     LFTs  Lab Results   Component Value Date    ALT 22 05/17/2024    AST 14 05/17/2024    ALKPHOS 93 05/17/2024    BILITOT 0.5 05/17/2024     FLP  Lab Results   Component Value Date    TRIG 114 05/13/2024    CHOL 203 (H) 05/13/2024    LDLF 131 (H) 09/29/2022    LDLCALC 139 (H) 05/13/2024    HDL 41.0 05/13/2024     Urine Microalbumin  Lab Results   Component Value Date    MICROALBCREA  05/13/2024      Comment:      One or more analytes used in this calculation is outside of the analytical measurement range. Calculation cannot be performed.     Weight Management  Wt Readings from Last 3 Encounters:   06/06/25 73.5 kg (162 lb)   01/31/25 72.6 kg (160 lb)   01/30/25 73.6 kg (162 lb 3.2 oz)      There is no height or weight on file to calculate BMI.     Assessment/Plan   Problem List Items Addressed This Visit       Type 2 diabetes mellitus without complication, without long-term current use of insulin - Primary    Patient's goal A1c is < 7%.  Is pt at goal? No, 8.5% (6/6/2025)  Patient's SMBGs are above gaol.     Rationale for plan: Patient states she is tolerating Mounjaro well, but blood sugars still elevated. Will increase dose and follow up in 4 weeks.    Medication Changes:  CONTINUE  Jardiance 25mg daily  Glipizide 5mg twice a day  INCREASE  Mounjaro to 7.5mg weekly. Will send to [facility]    Future Considerations:  Titrate Mounjaro as tolerated    Diabetes Education  Rule of 15: eating ~15 g of carbs when BG less than 80 (half cup juice, 3-4 glucose tabs).  Recognize symptoms of high and low blood sugar.   Eat a realistic healthy diet consisting of fruits, vegetables, fiber, protein food choices on a regular basis and be aware of portion/serving sizes. Reduce carbohydrate consumption and always consume with protein and fat. Avoid foods high in saturated/trans fat, high salt content, and sweets and beverages with added sugars.  Limit alcohol consumption; alcohol may affect your blood sugar  and cause hypoglycemia.   Stay active and incorporate ~30 mins of exercise into your daily routine to manage your weight and increase the body's acceptance of insulin.    PATIENT GOALS   Fasting B - 130 mg/dL 2-HR Postprandial BG:   Less than 180 mg/dL A1c:   Less than 7.0 %     Empagliflozin (Jardiance) Education:  Counseled patient on Empagliflozin (Jardiance) MOA, expectations, side effects, duration of therapy, administration, and monitoring parameters.  Reviewed the benefits of SGLT-2i therapy, such as glycemic control and kidney and CV protection.  Advised patient to practice proper  hygiene to reduce risk of UTIs or yeast infections.  Advised patient to maintain adequate fluid intake to remain hydrated while on SGLT2i therapy.  Answered all patient questions and concerns.    Mounjaro Education:  Counseled patient on Mounjaro MOA, expectations, side effects, duration of therapy, administration, and monitoring parameters.  Counseled patient on the benefits of GLP-1ra, such as cardiovascular risk reduction, glycemic control, and weight loss potential.  Provided detailed dosing and administration counseling to ensure proper technique.   Reviewed Mounjaro titration schedule, starting with 2.5 mg once weekly to 5 mg, 7.5mg, 10mg, 12.5mg and if tolerated 15 mg.  Reviewed storage requirements of Mounjaro when not in use, and when to administer the medication if a dose is missed.  Discussed risks of GLP1ra including risk of pancreatitis, MTC and worsening of DR  Advised patient that they may experience improved satiety after meals and portion sizes of meals may be reduced as doses of Mounjaro increase.         Relevant Medications    tirzepatide (Mounjaro) 7.5 mg/0.5 mL pen injector    Other Relevant Orders    Referral to Clinical Pharmacy       Clinical Pharmacist follow-up: 8/15/2025 at 8:40AM, Telehealth visit    Continue all meds under the continuation of care with the referring provider and clinical  "pharmacy team.    Thank you,  Diana Veloz, PharmD   Clinical Pharmacist  [phone]     Verbal consent to manage patient's drug therapy was obtained from the patient. They were informed they may decline to participate or withdraw from participation in pharmacy services at any time.       [1]   Allergies  Allergen Reactions    Canagliflozin Other     UTI, yeast infections    Metformin Other     \"Could not stand straight, bent over in abdominal pain\"     "
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
## 2025-07-21 DIAGNOSIS — E11.9 TYPE 2 DIABETES MELLITUS WITHOUT COMPLICATION, WITHOUT LONG-TERM CURRENT USE OF INSULIN: ICD-10-CM

## 2025-07-21 NOTE — TELEPHONE ENCOUNTER
Please resend prescription of   empagliflozin (Jardiance) 25 mg tablet   Full 90 day supply due to insurance purposes.  [facility]

## 2025-07-22 LAB
ALBUMIN SERPL-MCNC: 4.3 G/DL (ref 3.6–5.1)
ALP SERPL-CCNC: 88 U/L (ref 37–153)
ALT SERPL-CCNC: 18 U/L (ref 6–29)
ANION GAP SERPL CALCULATED.4IONS-SCNC: 9 MMOL/L (CALC) (ref 7–17)
AST SERPL-CCNC: 13 U/L (ref 10–35)
BASOPHILS # BLD AUTO: 61 CELLS/UL (ref 0–200)
BASOPHILS NFR BLD AUTO: 0.9 %
BILIRUB SERPL-MCNC: 0.4 MG/DL (ref 0.2–1.2)
BUN SERPL-MCNC: 17 MG/DL (ref 7–25)
CALCIUM SERPL-MCNC: 9 MG/DL (ref 8.6–10.4)
CHLORIDE SERPL-SCNC: 106 MMOL/L (ref 98–110)
CHOLEST SERPL-MCNC: 207 MG/DL
CHOLEST/HDLC SERPL: 3.7 (CALC)
CO2 SERPL-SCNC: 23 MMOL/L (ref 20–32)
CREAT SERPL-MCNC: 0.61 MG/DL (ref 0.5–1.03)
EGFRCR SERPLBLD CKD-EPI 2021: 108 ML/MIN/1.73M2
EOSINOPHIL # BLD AUTO: 190 CELLS/UL (ref 15–500)
EOSINOPHIL NFR BLD AUTO: 2.8 %
ERYTHROCYTE [DISTWIDTH] IN BLOOD BY AUTOMATED COUNT: 12.5 % (ref 11–15)
GLUCOSE SERPL-MCNC: 96 MG/DL (ref 65–99)
HCT VFR BLD AUTO: 50.2 % (ref 35–45)
HDLC SERPL-MCNC: 56 MG/DL
HGB BLD-MCNC: 16.2 G/DL (ref 11.7–15.5)
LDLC SERPL CALC-MCNC: 129 MG/DL (CALC)
LYMPHOCYTES # BLD AUTO: 1448 CELLS/UL (ref 850–3900)
LYMPHOCYTES NFR BLD AUTO: 21.3 %
MCH RBC QN AUTO: 28.4 PG (ref 27–33)
MCHC RBC AUTO-ENTMCNC: 32.3 G/DL (ref 32–36)
MCV RBC AUTO: 88.1 FL (ref 80–100)
MONOCYTES # BLD AUTO: 442 CELLS/UL (ref 200–950)
MONOCYTES NFR BLD AUTO: 6.5 %
NEUTROPHILS # BLD AUTO: 4658 CELLS/UL (ref 1500–7800)
NEUTROPHILS NFR BLD AUTO: 68.5 %
NONHDLC SERPL-MCNC: 151 MG/DL (CALC)
PLATELET # BLD AUTO: 286 THOUSAND/UL (ref 140–400)
PMV BLD REES-ECKER: 9.6 FL (ref 7.5–12.5)
POTASSIUM SERPL-SCNC: 4.2 MMOL/L (ref 3.5–5.3)
PROT SERPL-MCNC: 6.8 G/DL (ref 6.1–8.1)
RBC # BLD AUTO: 5.7 MILLION/UL (ref 3.8–5.1)
SODIUM SERPL-SCNC: 138 MMOL/L (ref 135–146)
TRIGL SERPL-MCNC: 114 MG/DL
WBC # BLD AUTO: 6.8 THOUSAND/UL (ref 3.8–10.8)

## 2025-07-25 DIAGNOSIS — E11.9 TYPE 2 DIABETES MELLITUS WITHOUT COMPLICATION, WITHOUT LONG-TERM CURRENT USE OF INSULIN: ICD-10-CM

## 2025-07-28 ENCOUNTER — PHARMACY VISIT (OUTPATIENT)
Dept: PHARMACY | Facility: CLINIC | Age: 50
End: 2025-07-28
Payer: COMMERCIAL

## 2025-08-09 DIAGNOSIS — E11.9 TYPE 2 DIABETES MELLITUS WITHOUT COMPLICATION, WITHOUT LONG-TERM CURRENT USE OF INSULIN: ICD-10-CM

## 2025-08-11 RX ORDER — GLIPIZIDE 5 MG/1
5 TABLET ORAL
Qty: 90 TABLET | Refills: 1 | Status: SHIPPED | OUTPATIENT
Start: 2025-08-11 | End: 2025-08-15 | Stop reason: ALTCHOICE

## 2025-08-15 ENCOUNTER — APPOINTMENT (OUTPATIENT)
Dept: PHARMACY | Facility: HOSPITAL | Age: 50
End: 2025-08-15
Payer: COMMERCIAL

## 2025-08-15 DIAGNOSIS — E11.9 TYPE 2 DIABETES MELLITUS WITHOUT COMPLICATION, WITHOUT LONG-TERM CURRENT USE OF INSULIN: ICD-10-CM

## 2025-08-15 RX ORDER — TIRZEPATIDE 10 MG/.5ML
10 INJECTION, SOLUTION SUBCUTANEOUS WEEKLY
Qty: 2 ML | Refills: 0 | Status: SHIPPED | OUTPATIENT
Start: 2025-08-15

## 2025-09-05 ENCOUNTER — APPOINTMENT (OUTPATIENT)
Dept: PRIMARY CARE | Facility: CLINIC | Age: 50
End: 2025-09-05
Payer: COMMERCIAL

## 2025-09-12 ENCOUNTER — APPOINTMENT (OUTPATIENT)
Dept: PHARMACY | Facility: HOSPITAL | Age: 50
End: 2025-09-12
Payer: COMMERCIAL

## 2025-10-21 ENCOUNTER — APPOINTMENT (OUTPATIENT)
Dept: PRIMARY CARE | Facility: CLINIC | Age: 50
End: 2025-10-21
Payer: COMMERCIAL

## (undated) DEVICE — Z DISCONTINUED USE 2271144 DRAIN SURG W0.25XL18IN PRECUT UNIF WALL THICKNESS PENROSE

## (undated) DEVICE — SYRINGE MED 10ML LUERLOCK TIP W/O SFTY DISP

## (undated) DEVICE — SUTURE VCRL SZ 3-0 L27IN ABSRB UD L26MM SH 1/2 CIR J416H

## (undated) DEVICE — GAUZE,SPONGE,4"X4",12PLY,STERILE,LF,2'S: Brand: MEDLINE

## (undated) DEVICE — GOWN,AURORA,NONRNF,XL,30/CS: Brand: MEDLINE

## (undated) DEVICE — ELECTRODE PT RET AD L9FT HI MOIST COND ADH HYDRGEL CORDED

## (undated) DEVICE — TRAY PREP DRY W/ PREM GLV 2 APPL 6 SPNG 2 UNDPD 1 OVERWRAP

## (undated) DEVICE — SUTURE ABSORBABLE BRAIDED 0 CT-1 8X27 IN UD VICRYL JJ41G

## (undated) DEVICE — GOWN,AURORA,NONREINFORCED,LARGE: Brand: MEDLINE

## (undated) DEVICE — COUNTER NDL 40 COUNT HLD 70 FOAM BLK ADH W/ MAG

## (undated) DEVICE — TOWEL,OR,DSP,ST,BLUE,STD,4/PK,20PK/CS: Brand: MEDLINE

## (undated) DEVICE — SPONGE,LAP,18"X18",DLX,XR,ST,5/PK,40/PK: Brand: MEDLINE

## (undated) DEVICE — LABEL MED MINI W/ MARKER

## (undated) DEVICE — SUTURE MCRYL SZ 4-0 L27IN ABSRB UD L19MM PS-2 1/2 CIR PRIM Y426H

## (undated) DEVICE — CHLORAPREP 26ML ORANGE

## (undated) DEVICE — 3M™ STERI-STRIP™ REINFORCED ADHESIVE SKIN CLOSURES, R1547, 1/2 IN X 4 IN (12 MM X 100 MM), 6 STRIPS/ENVELOPE: Brand: 3M™ STERI-STRIP™

## (undated) DEVICE — SUTURE VCRL SZ 2-0 L27IN ABSRB UD L26MM SH 1/2 CIR J417H

## (undated) DEVICE — INTENDED FOR TISSUE SEPARATION, AND OTHER PROCEDURES THAT REQUIRE A SHARP SURGICAL BLADE TO PUNCTURE OR CUT.: Brand: BARD-PARKER ® CARBON RIB-BACK BLADES

## (undated) DEVICE — GLOVE SURG SZ 75 STD WHT LTX SYN POLYMER BEAD REINF ANTI RL

## (undated) DEVICE — NEEDLE HYPO 25GA L1.5IN BLU POLYPR HUB S STL REG BVL STR

## (undated) DEVICE — TUBING, SUCTION, 1/4" X 10', STRAIGHT: Brand: MEDLINE

## (undated) DEVICE — YANKAUER,BULB TIP,W/O VENT,RIGID,STERILE: Brand: MEDLINE

## (undated) DEVICE — MARKER SURG SKIN GENTIAN VLT REG TIP W/ 6IN RUL

## (undated) DEVICE — PACK,LAPAROTOMY,NO GOWNS: Brand: MEDLINE

## (undated) DEVICE — ELECTROSURGICAL PENCIL BUTTON SWITCH E-Z CLEAN COATED BLADE ELECTRODE 10 FT (3 M) CORD HOLSTER: Brand: MEGADYNE